# Patient Record
Sex: FEMALE | Race: WHITE | Employment: PART TIME | URBAN - METROPOLITAN AREA
[De-identification: names, ages, dates, MRNs, and addresses within clinical notes are randomized per-mention and may not be internally consistent; named-entity substitution may affect disease eponyms.]

---

## 2019-06-19 ASSESSMENT — ENCOUNTER SYMPTOMS
COUGH: 0
SORE THROAT: 0
SHORTNESS OF BREATH: 0
VOMITING: 0
CONSTIPATION: 0
DIARRHEA: 0
WHEEZING: 0
BACK PAIN: 0
BLOOD IN STOOL: 0
SINUS PAIN: 0
ABDOMINAL PAIN: 0
NAUSEA: 0

## 2019-06-19 NOTE — PROGRESS NOTES
New Patient Office Visit   6/21/2019    Subjective:  Chief Complaint   Patient presents with   José Miguel Mario Doctor     Would like to start back on Sertraline 100 mg     HPI:  Julieta Springer is a 22 y.o. female who presents to the clinic today to establish care. Mood- Pt reports a history of depression. She used to take zoloft 100 mg PO daily without side effects. She states that it was working well. She ran out of this medication 5 months ago and never followed up. Has never seen a psychologist- but states she does not have time with school at this time. No SI/HI. Allergic rhinitis- Taking Zyrtec with relief. Asymptomatic. Pap completed 05/25/18 with OBGYN - Dr. Chin Posadas. Not sexually active- no need for STD screen. OBGYN note shows she received Gardasil- will pull records. She reports that she believes she is up to date on vaccinations. Pt in  [employer] program. 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 For fun, her life is consumed with homework. Review of Systems   Constitutional: Negative for chills, fatigue, fever and unexpected weight change. HENT: Negative for congestion, postnasal drip, sinus pain, sore throat and tinnitus. Respiratory: Negative for cough, shortness of breath and wheezing. Cardiovascular: Negative for chest pain, palpitations and leg swelling. Gastrointestinal: Negative for abdominal pain, blood in stool, constipation, diarrhea, nausea and vomiting. Genitourinary: Negative for dysuria, frequency, hematuria and urgency. Musculoskeletal: Negative for back pain, gait problem, myalgias and neck pain. Skin: Negative for pallor and rash. Neurological: Negative for dizziness, weakness, light-headedness, numbness and headaches. Psychiatric/Behavioral: Positive for dysphoric mood. Negative for agitation, behavioral problems, confusion, decreased concentration, hallucinations, self-injury, sleep disturbance and suicidal ideas. The patient is nervous/anxious.  The patient is not hyperactive.       Allergies   Allergen Reactions    Ceftin [Cefuroxime] Rash     Family History   Problem Relation Age of Onset    No Known Problems Mother     Hypertension Father     Lung Cancer Maternal Grandmother     Diabetes Paternal Grandmother     Breast Cancer Neg Hx     Ovarian Cancer Neg Hx     Heart Attack Neg Hx     Stroke Neg Hx      Current Outpatient Rx   Medication Sig Dispense Refill    cetirizine (ZYRTEC) 10 MG tablet Take 10 mg by mouth daily      sertraline (ZOLOFT) 50 MG tablet Take 1 tablet by mouth daily 60 tablet 0     Social History     Socioeconomic History    Marital status: Single     Spouse name: Not on file    Number of children: Not on file    Years of education: Not on file    Highest education level: Not on file   Occupational History    Occupation: Student (full time)     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)    Occupation: [employer] (part time)   Social Needs    Financial resource strain: Not on file    Food insecurity:     Worry: Not on file     Inability: Not on file   [place] needs:     Medical: Not on file     Non-medical: Not on file   Tobacco Use    Smoking status: Never Smoker    Smokeless tobacco: Never Used   Substance and Sexual Activity    Alcohol use: No    Drug use: Never    Sexual activity: Not Currently   Lifestyle    Physical activity:     Days per week: Not on file     Minutes per session: Not on file    Stress: Not on file   Relationships    Social connections:     Talks on phone: Not on file     Gets together: Not on file     Attends Faith service: Not on file     Active member of club or organization: Not on file     Attends meetings of clubs or organizations: Not on file     Relationship status: Not on file    Intimate partner violence:     Fear of current or ex partner: Not on file     Emotionally abused: Not on file     Physically abused: Not on file     Forced sexual activity: Not on file Other Topics Concern    Not on file   Social History Narrative    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 For fun, her life is consumed with homework. Past Medical History:   Diagnosis Date    Depression     Seasonal allergies      Current Outpatient Medications   Medication Sig Dispense Refill    cetirizine (ZYRTEC) 10 MG tablet Take 10 mg by mouth daily      sertraline (ZOLOFT) 50 MG tablet Take 1 tablet by mouth daily 60 tablet 0     No current facility-administered medications for this visit. Patient Active Problem List   Diagnosis    Allergic rhinitis due to allergen    Anxiety and depression      Wt Readings from Last 3 Encounters:   06/21/19 254 lb (115.2 kg)   08/16/14 (!) 230 lb (104.3 kg) (99 %, Z= 2.30)*     * Growth percentiles are based on [facility] (Girls, 2-20 Years) data. BP Readings from Last 3 Encounters:   06/21/19 124/80     PHQ-9 Total Score: 24 (6/21/2019 10:47 AM)    Objective/Physical Exam:  /80   Pulse 83   Ht 5' 2\" (1.575 m)   Wt 254 lb (115.2 kg)   SpO2 98%   BMI 46.46 kg/m²   Body mass index is 46.46 kg/m². Physical Exam   Constitutional: She is oriented to person, place, and time. She appears well-developed and well-nourished. No distress. HENT:   Head: Normocephalic and atraumatic. Right Ear: External ear normal.   Left Ear: External ear normal.   Mouth/Throat: Oropharynx is clear and moist. No oropharyngeal exudate. Eyes: Pupils are equal, round, and reactive to light. Conjunctivae are normal.   Neck: Normal range of motion. Neck supple. No thyromegaly present. Cardiovascular: Normal rate, regular rhythm, normal heart sounds and intact distal pulses. Exam reveals no gallop and no friction rub. No murmur heard. Pulmonary/Chest: Effort normal and breath sounds normal. No respiratory distress. She has no wheezes. She has no rales. She exhibits no tenderness. Abdominal: Soft. Bowel sounds are normal. She exhibits no distension.  There is no tenderness. There is no guarding. Musculoskeletal: Normal range of motion. She exhibits no edema, tenderness or deformity. Lymphadenopathy:     She has no cervical adenopathy. Neurological: She is alert and oriented to person, place, and time. Coordination normal.   Skin: Skin is warm and dry. No rash noted. She is not diaphoretic. No erythema. No pallor. Psychiatric: She has a normal mood and affect. Her behavior is normal. Judgment and thought content normal.   Vitals reviewed. Assessment and Plan:  [place] was seen today for established new doctor. Diagnoses and all orders for this visit:    Encounter to establish care with new doctor    Seasonal allergic rhinitis due to other allergic trigger   - Well-controlled with current regimen. Continue current regimen. Anxiety and depression  -    Patient reports that her mood was stable on Zoloft 100 mg by mouth daily. She has been off this medication for months. No suicidal or homicidal ideation. She has never seen psychology. - The patient is agreeable to restarting Zoloft at 50 mg by mouth daily for 1 week and increasing to 100 mg by mouth daily if her mood is not improving to get back to the baseline dose that she was currently taking. She is agreeable to seeing psychology and a referral will be placed today. - Ambulatory referral to Psychology  -     CBC Auto Differential; Future  -     COMPREHENSIVE METABOLIC PANEL; Future  -     TSH WITH REFLEX TO FT4; Future  -     sertraline (ZOLOFT) 50 MG tablet; Take 1 tablet by mouth daily - patient education handout provided and reviewed with the pt. Screening for HIV (human immunodeficiency virus)  -     HIV Screen; Future    Return in about 1 month (around 7/19/2019) for mood f/u, or sooner if needed. Pt will call if symptoms worsen or fail to improve. All questions answered. Pt states no further questions or concerns at this time.    Electronically signed by: Sarah Harris 06/21/19

## 2019-06-21 ENCOUNTER — OFFICE VISIT (OUTPATIENT)
Dept: INTERNAL MEDICINE CLINIC | Age: 24
End: 2019-06-21
Payer: COMMERCIAL

## 2019-06-21 VITALS
BODY MASS INDEX: 46.74 KG/M2 | HEART RATE: 83 BPM | WEIGHT: 254 LBS | OXYGEN SATURATION: 98 % | HEIGHT: 62 IN | DIASTOLIC BLOOD PRESSURE: 80 MMHG | SYSTOLIC BLOOD PRESSURE: 124 MMHG

## 2019-06-21 DIAGNOSIS — Z76.89 ENCOUNTER TO ESTABLISH CARE WITH NEW DOCTOR: Primary | ICD-10-CM

## 2019-06-21 DIAGNOSIS — Z11.4 SCREENING FOR HIV (HUMAN IMMUNODEFICIENCY VIRUS): ICD-10-CM

## 2019-06-21 DIAGNOSIS — J30.89 SEASONAL ALLERGIC RHINITIS DUE TO OTHER ALLERGIC TRIGGER: ICD-10-CM

## 2019-06-21 DIAGNOSIS — F32.A ANXIETY AND DEPRESSION: ICD-10-CM

## 2019-06-21 DIAGNOSIS — F41.9 ANXIETY AND DEPRESSION: ICD-10-CM

## 2019-06-21 PROBLEM — J30.9 ALLERGIC RHINITIS DUE TO ALLERGEN: Status: ACTIVE | Noted: 2019-06-21

## 2019-06-21 PROCEDURE — 99203 OFFICE O/P NEW LOW 30 MIN: CPT | Performed by: NURSE PRACTITIONER

## 2019-06-21 PROCEDURE — 1036F TOBACCO NON-USER: CPT | Performed by: NURSE PRACTITIONER

## 2019-06-21 PROCEDURE — G8417 CALC BMI ABV UP PARAM F/U: HCPCS | Performed by: NURSE PRACTITIONER

## 2019-06-21 PROCEDURE — 96160 PT-FOCUSED HLTH RISK ASSMT: CPT | Performed by: NURSE PRACTITIONER

## 2019-06-21 PROCEDURE — G8427 DOCREV CUR MEDS BY ELIG CLIN: HCPCS | Performed by: NURSE PRACTITIONER

## 2019-06-21 RX ORDER — CETIRIZINE HYDROCHLORIDE 10 MG/1
10 TABLET ORAL DAILY
COMMUNITY

## 2019-06-21 RX ORDER — SERTRALINE HYDROCHLORIDE 100 MG/1
100 TABLET, FILM COATED ORAL DAILY
COMMUNITY
End: 2019-06-21

## 2019-06-21 ASSESSMENT — PATIENT HEALTH QUESTIONNAIRE - PHQ9
3. TROUBLE FALLING OR STAYING ASLEEP: 3
9. THOUGHTS THAT YOU WOULD BE BETTER OFF DEAD, OR OF HURTING YOURSELF: 1
5. POOR APPETITE OR OVEREATING: 3
4. FEELING TIRED OR HAVING LITTLE ENERGY: 3
8. MOVING OR SPEAKING SO SLOWLY THAT OTHER PEOPLE COULD HAVE NOTICED. OR THE OPPOSITE, BEING SO FIGETY OR RESTLESS THAT YOU HAVE BEEN MOVING AROUND A LOT MORE THAN USUAL: 3
SUM OF ALL RESPONSES TO PHQ QUESTIONS 1-9: 24
6. FEELING BAD ABOUT YOURSELF - OR THAT YOU ARE A FAILURE OR HAVE LET YOURSELF OR YOUR FAMILY DOWN: 3
10. IF YOU CHECKED OFF ANY PROBLEMS, HOW DIFFICULT HAVE THESE PROBLEMS MADE IT FOR YOU TO DO YOUR WORK, TAKE CARE OF THINGS AT HOME, OR GET ALONG WITH OTHER PEOPLE: 2
7. TROUBLE CONCENTRATING ON THINGS, SUCH AS READING THE NEWSPAPER OR WATCHING TELEVISION: 3
SUM OF ALL RESPONSES TO PHQ QUESTIONS 1-9: 24
1. LITTLE INTEREST OR PLEASURE IN DOING THINGS: 3
2. FEELING DOWN, DEPRESSED OR HOPELESS: 2
SUM OF ALL RESPONSES TO PHQ9 QUESTIONS 1 & 2: 5

## 2019-06-21 NOTE — PATIENT INSTRUCTIONS
After 1 week, if not feeling any better on Zoloft 50 mg, can increase to 100 mg daily. Please get your fasting lab work (no food or drink for 10-12 hours prior besides water) completed M-F [id number] at our office. [facility] lab has walk-in hours available as well - they are open Saturday 7a-3p - no appointment is needed. We will call with your results. Patient Education        sertraline  Pronunciation:  SER tra kin  Brand:  Zoloft  What is the most important information I should know about sertraline? You should not use sertraline if you also take pimozide, or if you are being treated with methylene blue injection. Do not use this medicine if you have used an MAO inhibitor in the past 14 days, such as isocarboxazid, linezolid, methylene blue injection, phenelzine, rasagiline, selegiline, or tranylcypromine. Some young people have thoughts about suicide when first taking an antidepressant. Stay alert to changes in your mood or symptoms. Report any new or worsening symptoms to your doctor. Seek medical attention right away if you have symptoms of serotonin syndrome, such as: agitation, hallucinations, fever, sweating, shivering, fast heart rate, muscle stiffness, twitching, loss of coordination, nausea, vomiting, or diarrhea. What is sertraline? Sertraline is an antidepressant in a group of drugs called selective serotonin reuptake inhibitors (SSRIs). Sertraline affects chemicals in the brain that may be unbalanced in people with depression, panic, anxiety, or obsessive-compulsive symptoms. Sertraline is used to treat depression, obsessive-compulsive disorder, panic disorder, anxiety disorders, post-traumatic stress disorder (PTSD), and premenstrual dysphoric disorder (PMDD). Sertraline may also be used for purposes not listed in this medication guide. What should I discuss with my healthcare provider before taking sertraline?   You should not use sertraline if you are allergic to it, or if you also take pimozide. Do not use the liquid form of sertraline  if you are taking disulfiram (Antabuse) or you could have a severe reaction to the disulfiram.  Do not take sertraline within 14 days before or 14 days after you take an MAO inhibitor. A dangerous drug interaction could occur. MAO inhibitors include isocarboxazid, linezolid, phenelzine, rasagiline, selegiline, and tranylcypromine. To make sure sertraline is safe for you, tell your doctor if you have ever had:  · heart disease, high blood pressure, or a stroke;  · liver or kidney disease;  · a seizure;  · bleeding problems, or if you take warfarin (Coumadin, Jantoven);  · bipolar disorder (manic depression); or  · low levels of sodium in your blood. Some medicines can interact with sertraline and cause a serious condition called serotonin syndrome. Be sure your doctor knows if you also take stimulant medicine, opioid medicine, herbal products, other antidepressants, or medicine for mental illness, Parkinson's disease, migraine headaches, serious infections, or prevention of nausea and vomiting. Ask your doctor before making any changes in how or when you take your medications. Some young people have thoughts about suicide when first taking an antidepressant. Your doctor should check your progress at regular visits. Your family or other caregivers should also be alert to changes in your mood or symptoms. Taking an SSRI antidepressant during pregnancy may cause serious lung problems or other complications in the baby. However, you may have a relapse of depression if you stop taking your antidepressant. Tell your doctor right away if you become pregnant. Do not start or stop taking this medicine during pregnancy without your doctor's advice. It is not known whether sertraline passes into breast milk or if it could harm a nursing baby. Tell your doctor if you are breast-feeding a baby.   Do not give sertraline to anyone younger than 25years old without the advice of a doctor. Sertraline is FDA-approved for children with obsessive-compulsive disorder (OCD). It is not approved for treating depression in children. How should I take sertraline? Follow all directions on your prescription label. Your doctor may occasionally change your dose. Do not take this medicine in larger or smaller amounts or for longer than recommended. Sertraline may be taken with or without food. Try to take the medicine at the same time each day. The liquid (oral concentrate) form of sertraline must be diluted before you take it. To be sure you get the correct dose, measure the liquid with the medicine dropper provided. Mix the dose with 4 ounces (one-half cup) of water, ginger ale, lemon/lime soda, lemonade, or orange juice. Do not use any other liquids to dilute the medicine. Stir this mixture and drink all of it right away. To make sure you get the entire dose, add a little more water to the same glass, swirl gently and drink right away. This medicine can cause you to have a false positive drug screening test. If you provide a urine sample for drug screening, tell the laboratory staff that you are taking sertraline. It may take up to 4 weeks before your symptoms improve. Keep using the medication as directed and tell your doctor if your symptoms do not improve. Do not stop using sertraline suddenly, or you could have unpleasant withdrawal symptoms. Ask your doctor how to safely stop using sertraline. Store at room temperature away from moisture and heat. What happens if I miss a dose? Take the missed dose as soon as you remember. Skip the missed dose if it is almost time for your next scheduled dose. Do not take extra medicine to make up the missed dose. What happens if I overdose? Seek emergency medical attention or call the Poison Help line at 1-528.955.7763. What should I avoid while taking sertraline? Do not drink alcohol.   Ask your doctor before taking a your doctor for medical advice about side effects. You may report side effects to FDA at 9-415-KBS-3783. What other drugs will affect sertraline? Taking sertraline with other drugs that make you sleepy can worsen this effect. Ask your doctor before taking a sleeping pill, narcotic medication, muscle relaxer, or medicine for anxiety, depression, or seizures. Other drugs may interact with sertraline, including prescription and over-the-counter medicines, vitamins, and herbal products. Tell your doctor about all your current medicines and any medicine you start or stop using. Where can I get more information? Your pharmacist can provide more information about sertraline. Remember, keep this and all other medicines out of the reach of children, never share your medicines with others, and use this medication only for the indication prescribed. Every effort has been made to ensure that the information provided by Kirsten Ver[address] is accurate, up-to-date, and complete, but no guarantee is made to that effect. Drug information contained herein may be time sensitive. [employer] information has been compiled for use by healthcare practitioners and consumers in the [place] and therefore [employer] does not warrant that uses outside of the [place] are appropriate, unless specifically indicated otherwise. [employer]'s drug information does not endorse drugs, diagnose patients or recommend therapy. [employer] drug information is an informational resource designed to assist licensed healthcare practitioners in caring for their patients and/or to serve consumers viewing this service as a supplement to, and not a substitute for, the expertise, skill, knowledge and judgment of healthcare practitioners. The absence of a warning for a given drug or drug combination in no way should be construed to indicate that the drug or drug combination is safe, effective or appropriate for any given patient.  [employer] does not assume any responsibility for any aspect of healthcare administered with the aid of information [facility] provides. The information contained herein is not intended to cover all possible uses, directions, precautions, warnings, drug interactions, allergic reactions, or adverse effects. If you have questions about the drugs you are taking, check with your doctor, nurse or pharmacist.  Copyright 3904-2703 [facility] [employer]. Version: 21.01. Revision date: 8/9/2017. Care instructions adapted under license by [facility] ([facility]). If you have questions about a medical condition or this instruction, always ask your healthcare professional. [place] [address] any warranty or liability for your use of this information.

## 2019-06-24 ENCOUNTER — TELEPHONE (OUTPATIENT)
Dept: INTERNAL MEDICINE CLINIC | Age: 24
End: 2019-06-24

## 2019-06-24 NOTE — TELEPHONE ENCOUNTER
One tablet daily for 1-2 weeks and if pt tolerates the medication well without side effects but feels the medication is not helping at the current dose, she can increase to two tablets daily.  She was taking two tablets daily prior

## 2019-06-24 NOTE — TELEPHONE ENCOUNTER
u[facility] Lin calling about the Sertraline script---directions say 1 castillo by mouth---pt says she was suppose to take 1 castillo for a week and then 2 castillo after that----they need correct directions---please let them know. Thanks.

## 2019-07-12 ENCOUNTER — TELEPHONE (OUTPATIENT)
Dept: INTERNAL MEDICINE CLINIC | Age: 24
End: 2019-07-12

## 2019-07-15 ENCOUNTER — OFFICE VISIT (OUTPATIENT)
Dept: PSYCHOLOGY | Age: 24
End: 2019-07-15
Payer: COMMERCIAL

## 2019-07-15 DIAGNOSIS — F41.1 GAD (GENERALIZED ANXIETY DISORDER): ICD-10-CM

## 2019-07-15 DIAGNOSIS — F33.1 MODERATE EPISODE OF RECURRENT MAJOR DEPRESSIVE DISORDER (HCC): Primary | ICD-10-CM

## 2019-07-15 PROCEDURE — 90791 PSYCH DIAGNOSTIC EVALUATION: CPT | Performed by: PSYCHOLOGIST

## 2019-07-15 ASSESSMENT — PATIENT HEALTH QUESTIONNAIRE - PHQ9
6. FEELING BAD ABOUT YOURSELF - OR THAT YOU ARE A FAILURE OR HAVE LET YOURSELF OR YOUR FAMILY DOWN: 2
SUM OF ALL RESPONSES TO PHQ9 QUESTIONS 1 & 2: 4
SUM OF ALL RESPONSES TO PHQ QUESTIONS 1-9: 21
9. THOUGHTS THAT YOU WOULD BE BETTER OFF DEAD, OR OF HURTING YOURSELF: 0
5. POOR APPETITE OR OVEREATING: 3
2. FEELING DOWN, DEPRESSED OR HOPELESS: 2
7. TROUBLE CONCENTRATING ON THINGS, SUCH AS READING THE NEWSPAPER OR WATCHING TELEVISION: 3
1. LITTLE INTEREST OR PLEASURE IN DOING THINGS: 2
3. TROUBLE FALLING OR STAYING ASLEEP: 3
4. FEELING TIRED OR HAVING LITTLE ENERGY: 3
SUM OF ALL RESPONSES TO PHQ QUESTIONS 1-9: 21
8. MOVING OR SPEAKING SO SLOWLY THAT OTHER PEOPLE COULD HAVE NOTICED. OR THE OPPOSITE, BEING SO FIGETY OR RESTLESS THAT YOU HAVE BEEN MOVING AROUND A LOT MORE THAN USUAL: 3
10. IF YOU CHECKED OFF ANY PROBLEMS, HOW DIFFICULT HAVE THESE PROBLEMS MADE IT FOR YOU TO DO YOUR WORK, TAKE CARE OF THINGS AT HOME, OR GET ALONG WITH OTHER PEOPLE: 1

## 2019-07-15 ASSESSMENT — ENCOUNTER SYMPTOMS
NAUSEA: 0
COUGH: 0
WHEEZING: 0
CONSTIPATION: 0
DIARRHEA: 0
SHORTNESS OF BREATH: 0
ABDOMINAL PAIN: 0
VOMITING: 0

## 2019-07-19 ENCOUNTER — OFFICE VISIT (OUTPATIENT)
Dept: INTERNAL MEDICINE CLINIC | Age: 24
End: 2019-07-19
Payer: MEDICAID

## 2019-07-19 VITALS
BODY MASS INDEX: 46.82 KG/M2 | WEIGHT: 256 LBS | OXYGEN SATURATION: 99 % | HEART RATE: 93 BPM | SYSTOLIC BLOOD PRESSURE: 124 MMHG | DIASTOLIC BLOOD PRESSURE: 82 MMHG

## 2019-07-19 DIAGNOSIS — F32.A ANXIETY AND DEPRESSION: Primary | ICD-10-CM

## 2019-07-19 DIAGNOSIS — Z13.31 POSITIVE DEPRESSION SCREENING: ICD-10-CM

## 2019-07-19 DIAGNOSIS — F41.9 ANXIETY AND DEPRESSION: Primary | ICD-10-CM

## 2019-07-19 PROCEDURE — G8427 DOCREV CUR MEDS BY ELIG CLIN: HCPCS | Performed by: NURSE PRACTITIONER

## 2019-07-19 PROCEDURE — G8431 POS CLIN DEPRES SCRN F/U DOC: HCPCS | Performed by: NURSE PRACTITIONER

## 2019-07-19 PROCEDURE — G8417 CALC BMI ABV UP PARAM F/U: HCPCS | Performed by: NURSE PRACTITIONER

## 2019-07-19 PROCEDURE — 96160 PT-FOCUSED HLTH RISK ASSMT: CPT | Performed by: NURSE PRACTITIONER

## 2019-07-19 PROCEDURE — 1036F TOBACCO NON-USER: CPT | Performed by: NURSE PRACTITIONER

## 2019-07-19 PROCEDURE — 99213 OFFICE O/P EST LOW 20 MIN: CPT | Performed by: NURSE PRACTITIONER

## 2019-07-19 RX ORDER — SERTRALINE HYDROCHLORIDE 100 MG/1
100 TABLET, FILM COATED ORAL DAILY
Qty: 30 TABLET | Refills: 0 | Status: SHIPPED | OUTPATIENT
Start: 2019-07-19 | End: 2019-08-07 | Stop reason: SDUPTHER

## 2019-07-19 ASSESSMENT — PATIENT HEALTH QUESTIONNAIRE - PHQ9
3. TROUBLE FALLING OR STAYING ASLEEP: 3
9. THOUGHTS THAT YOU WOULD BE BETTER OFF DEAD, OR OF HURTING YOURSELF: 0
6. FEELING BAD ABOUT YOURSELF - OR THAT YOU ARE A FAILURE OR HAVE LET YOURSELF OR YOUR FAMILY DOWN: 2
SUM OF ALL RESPONSES TO PHQ9 QUESTIONS 1 & 2: 2
8. MOVING OR SPEAKING SO SLOWLY THAT OTHER PEOPLE COULD HAVE NOTICED. OR THE OPPOSITE, BEING SO FIGETY OR RESTLESS THAT YOU HAVE BEEN MOVING AROUND A LOT MORE THAN USUAL: 3
SUM OF ALL RESPONSES TO PHQ QUESTIONS 1-9: 19
4. FEELING TIRED OR HAVING LITTLE ENERGY: 3
SUM OF ALL RESPONSES TO PHQ QUESTIONS 1-9: 19
2. FEELING DOWN, DEPRESSED OR HOPELESS: 1
10. IF YOU CHECKED OFF ANY PROBLEMS, HOW DIFFICULT HAVE THESE PROBLEMS MADE IT FOR YOU TO DO YOUR WORK, TAKE CARE OF THINGS AT HOME, OR GET ALONG WITH OTHER PEOPLE: 1
7. TROUBLE CONCENTRATING ON THINGS, SUCH AS READING THE NEWSPAPER OR WATCHING TELEVISION: 3
5. POOR APPETITE OR OVEREATING: 3
1. LITTLE INTEREST OR PLEASURE IN DOING THINGS: 1

## 2019-07-19 ASSESSMENT — ENCOUNTER SYMPTOMS
COUGH: 0
DIARRHEA: 0
ABDOMINAL PAIN: 0
NAUSEA: 0
SHORTNESS OF BREATH: 0
WHEEZING: 0
VOMITING: 0
CONSTIPATION: 0

## 2019-07-23 RX ORDER — HYDROCORTISONE 25 MG/ML
LOTION TOPICAL 2 TIMES DAILY
COMMUNITY
End: 2019-08-07 | Stop reason: SDUPTHER

## 2019-08-07 ENCOUNTER — OFFICE VISIT (OUTPATIENT)
Dept: INTERNAL MEDICINE CLINIC | Age: 24
End: 2019-08-07
Payer: MEDICAID

## 2019-08-07 VITALS
HEART RATE: 86 BPM | BODY MASS INDEX: 47.11 KG/M2 | OXYGEN SATURATION: 98 % | HEIGHT: 62 IN | DIASTOLIC BLOOD PRESSURE: 80 MMHG | SYSTOLIC BLOOD PRESSURE: 119 MMHG | WEIGHT: 256 LBS

## 2019-08-07 DIAGNOSIS — L30.9 CHRONIC ECZEMA: ICD-10-CM

## 2019-08-07 DIAGNOSIS — F41.9 ANXIETY AND DEPRESSION: Primary | ICD-10-CM

## 2019-08-07 DIAGNOSIS — F32.A ANXIETY AND DEPRESSION: Primary | ICD-10-CM

## 2019-08-07 PROCEDURE — G8427 DOCREV CUR MEDS BY ELIG CLIN: HCPCS | Performed by: NURSE PRACTITIONER

## 2019-08-07 PROCEDURE — 96160 PT-FOCUSED HLTH RISK ASSMT: CPT | Performed by: NURSE PRACTITIONER

## 2019-08-07 PROCEDURE — G8417 CALC BMI ABV UP PARAM F/U: HCPCS | Performed by: NURSE PRACTITIONER

## 2019-08-07 PROCEDURE — 1036F TOBACCO NON-USER: CPT | Performed by: NURSE PRACTITIONER

## 2019-08-07 PROCEDURE — 99213 OFFICE O/P EST LOW 20 MIN: CPT | Performed by: NURSE PRACTITIONER

## 2019-08-07 RX ORDER — SERTRALINE HYDROCHLORIDE 100 MG/1
100 TABLET, FILM COATED ORAL DAILY
Qty: 90 TABLET | Refills: 0 | Status: SHIPPED | OUTPATIENT
Start: 2019-08-07 | End: 2019-11-07 | Stop reason: SDUPTHER

## 2019-08-07 RX ORDER — HYDROCORTISONE 25 MG/ML
LOTION TOPICAL 2 TIMES DAILY
Qty: 1 BOTTLE | Refills: 0 | Status: SHIPPED | OUTPATIENT
Start: 2019-08-07 | End: 2019-11-12 | Stop reason: SDUPTHER

## 2019-08-07 ASSESSMENT — PATIENT HEALTH QUESTIONNAIRE - PHQ9
6. FEELING BAD ABOUT YOURSELF - OR THAT YOU ARE A FAILURE OR HAVE LET YOURSELF OR YOUR FAMILY DOWN: 1
9. THOUGHTS THAT YOU WOULD BE BETTER OFF DEAD, OR OF HURTING YOURSELF: 0
3. TROUBLE FALLING OR STAYING ASLEEP: 2
1. LITTLE INTEREST OR PLEASURE IN DOING THINGS: 1
10. IF YOU CHECKED OFF ANY PROBLEMS, HOW DIFFICULT HAVE THESE PROBLEMS MADE IT FOR YOU TO DO YOUR WORK, TAKE CARE OF THINGS AT HOME, OR GET ALONG WITH OTHER PEOPLE: 1
SUM OF ALL RESPONSES TO PHQ9 QUESTIONS 1 & 2: 2
SUM OF ALL RESPONSES TO PHQ QUESTIONS 1-9: 15
SUM OF ALL RESPONSES TO PHQ QUESTIONS 1-9: 15
7. TROUBLE CONCENTRATING ON THINGS, SUCH AS READING THE NEWSPAPER OR WATCHING TELEVISION: 2
2. FEELING DOWN, DEPRESSED OR HOPELESS: 1
8. MOVING OR SPEAKING SO SLOWLY THAT OTHER PEOPLE COULD HAVE NOTICED. OR THE OPPOSITE, BEING SO FIGETY OR RESTLESS THAT YOU HAVE BEEN MOVING AROUND A LOT MORE THAN USUAL: 2
4. FEELING TIRED OR HAVING LITTLE ENERGY: 3
5. POOR APPETITE OR OVEREATING: 3

## 2019-08-14 ENCOUNTER — TELEPHONE (OUTPATIENT)
Dept: INTERNAL MEDICINE CLINIC | Age: 24
End: 2019-08-14
Payer: COMMERCIAL

## 2019-08-14 ENCOUNTER — OFFICE VISIT (OUTPATIENT)
Dept: PSYCHOLOGY | Age: 24
End: 2019-08-14
Payer: MEDICAID

## 2019-08-14 DIAGNOSIS — F41.1 GAD (GENERALIZED ANXIETY DISORDER): ICD-10-CM

## 2019-08-14 DIAGNOSIS — R30.0 DYSURIA: Primary | ICD-10-CM

## 2019-08-14 DIAGNOSIS — F33.1 MODERATE EPISODE OF RECURRENT MAJOR DEPRESSIVE DISORDER (HCC): Primary | ICD-10-CM

## 2019-08-14 LAB
BILIRUBIN, POC: NORMAL
BLOOD URINE, POC: NORMAL
CLARITY, POC: NORMAL
COLOR, POC: NORMAL
GLUCOSE URINE, POC: NORMAL
KETONES, POC: NORMAL
LEUKOCYTE EST, POC: NORMAL
NITRITE, POC: NORMAL
PH, POC: 5.5
PROTEIN, POC: NORMAL
SPECIFIC GRAVITY, POC: >=1.03
UROBILINOGEN, POC: 0.2

## 2019-08-14 PROCEDURE — 81002 URINALYSIS NONAUTO W/O SCOPE: CPT | Performed by: NURSE PRACTITIONER

## 2019-08-14 PROCEDURE — 90832 PSYTX W PT 30 MINUTES: CPT | Performed by: PSYCHOLOGIST

## 2019-08-14 ASSESSMENT — PATIENT HEALTH QUESTIONNAIRE - PHQ9
6. FEELING BAD ABOUT YOURSELF - OR THAT YOU ARE A FAILURE OR HAVE LET YOURSELF OR YOUR FAMILY DOWN: 1
3. TROUBLE FALLING OR STAYING ASLEEP: 3
SUM OF ALL RESPONSES TO PHQ QUESTIONS 1-9: 17
2. FEELING DOWN, DEPRESSED OR HOPELESS: 1
SUM OF ALL RESPONSES TO PHQ QUESTIONS 1-9: 17
7. TROUBLE CONCENTRATING ON THINGS, SUCH AS READING THE NEWSPAPER OR WATCHING TELEVISION: 2
5. POOR APPETITE OR OVEREATING: 3
9. THOUGHTS THAT YOU WOULD BE BETTER OFF DEAD, OR OF HURTING YOURSELF: 0
8. MOVING OR SPEAKING SO SLOWLY THAT OTHER PEOPLE COULD HAVE NOTICED. OR THE OPPOSITE, BEING SO FIGETY OR RESTLESS THAT YOU HAVE BEEN MOVING AROUND A LOT MORE THAN USUAL: 3
4. FEELING TIRED OR HAVING LITTLE ENERGY: 3
SUM OF ALL RESPONSES TO PHQ9 QUESTIONS 1 & 2: 2
10. IF YOU CHECKED OFF ANY PROBLEMS, HOW DIFFICULT HAVE THESE PROBLEMS MADE IT FOR YOU TO DO YOUR WORK, TAKE CARE OF THINGS AT HOME, OR GET ALONG WITH OTHER PEOPLE: 1
1. LITTLE INTEREST OR PLEASURE IN DOING THINGS: 1

## 2019-08-14 NOTE — PROGRESS NOTES
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[employer])    Occupation: [employer] (part time)   Social Needs    Financial resource strain: Not on file    Food insecurity:     Worry: Not on file     Inability: Not on file   [email or web address] needs:     Medical: Not on file     Non-medical: Not on file   Tobacco Use    Smoking status: Never Smoker    Smokeless tobacco: Never Used   Substance and Sexual Activity    Alcohol use: No    Drug use: Never    Sexual activity: Not Currently   Lifestyle    Physical activity:     Days per week: Not on file     Minutes per session: Not on file    Stress: Not on file   Relationships    Social connections:     Talks on phone: Not on file     Gets together: Not on file     Attends Jewish service: Not on file     Active member of club or organization: Not on file     Attends meetings of clubs or organizations: Not on file     Relationship status: Not on file    Intimate partner violence:     Fear of current or ex partner: Not on file     Emotionally abused: Not on file     Physically abused: Not on file     Forced sexual activity: Not on file   Other Topics Concern    Not on file   Social History Narrative    Pt in  [employer] program. 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 For fun, her life is consumed with homework. TOBACCO:   reports that she has never smoked. She has never used smokeless tobacco.  ETOH:   reports that she does not drink alcohol. A:  Administered PHQ-9 (see below). Patient endorses moderately severe symptoms of depression. Denied SI/HI.      PHQ Scores 8/14/2019 8/7/2019 7/19/2019 7/15/2019 6/21/2019   PHQ2 Score 2 2 2 4 5   PHQ9 Score 17 15 19 21 24     Interpretation of Total Score Depression Severity: 1-4 = Minimal depression, 5-9 = Mild depression, 10-14 = Moderate depression, 15-19 = Moderately severe depression, 20-27 = Severe depression        Diagnosis:  Major depressive disorder; recurrent and moderate  Generalized anxiety disorder  R/O PTSD    Plan:  Pt

## 2019-08-26 PROBLEM — F33.1 MODERATE EPISODE OF RECURRENT MAJOR DEPRESSIVE DISORDER (HCC): Status: ACTIVE | Noted: 2019-08-26

## 2019-08-26 PROBLEM — F41.1 GAD (GENERALIZED ANXIETY DISORDER): Status: ACTIVE | Noted: 2019-08-26

## 2019-09-23 ENCOUNTER — NURSE ONLY (OUTPATIENT)
Dept: INTERNAL MEDICINE CLINIC | Age: 24
End: 2019-09-23
Payer: MEDICAID

## 2019-09-23 ENCOUNTER — TELEPHONE (OUTPATIENT)
Dept: INTERNAL MEDICINE CLINIC | Age: 24
End: 2019-09-23

## 2019-09-23 DIAGNOSIS — Z23 NEED FOR TDAP VACCINATION: Primary | ICD-10-CM

## 2019-09-23 PROCEDURE — 90471 IMMUNIZATION ADMIN: CPT | Performed by: NURSE PRACTITIONER

## 2019-09-23 PROCEDURE — 90715 TDAP VACCINE 7 YRS/> IM: CPT | Performed by: NURSE PRACTITIONER

## 2019-10-02 ENCOUNTER — OFFICE VISIT (OUTPATIENT)
Dept: PSYCHOLOGY | Age: 24
End: 2019-10-02
Payer: MEDICAID

## 2019-10-02 DIAGNOSIS — F33.1 MODERATE EPISODE OF RECURRENT MAJOR DEPRESSIVE DISORDER (HCC): Primary | ICD-10-CM

## 2019-10-02 DIAGNOSIS — F41.1 GAD (GENERALIZED ANXIETY DISORDER): ICD-10-CM

## 2019-10-02 PROCEDURE — 90832 PSYTX W PT 30 MINUTES: CPT | Performed by: PSYCHOLOGIST

## 2019-10-02 ASSESSMENT — PATIENT HEALTH QUESTIONNAIRE - PHQ9
SUM OF ALL RESPONSES TO PHQ QUESTIONS 1-9: 14
1. LITTLE INTEREST OR PLEASURE IN DOING THINGS: 0
8. MOVING OR SPEAKING SO SLOWLY THAT OTHER PEOPLE COULD HAVE NOTICED. OR THE OPPOSITE, BEING SO FIGETY OR RESTLESS THAT YOU HAVE BEEN MOVING AROUND A LOT MORE THAN USUAL: 3
9. THOUGHTS THAT YOU WOULD BE BETTER OFF DEAD, OR OF HURTING YOURSELF: 0
7. TROUBLE CONCENTRATING ON THINGS, SUCH AS READING THE NEWSPAPER OR WATCHING TELEVISION: 2
SUM OF ALL RESPONSES TO PHQ QUESTIONS 1-9: 14
SUM OF ALL RESPONSES TO PHQ9 QUESTIONS 1 & 2: 1
5. POOR APPETITE OR OVEREATING: 2
2. FEELING DOWN, DEPRESSED OR HOPELESS: 1
3. TROUBLE FALLING OR STAYING ASLEEP: 1
4. FEELING TIRED OR HAVING LITTLE ENERGY: 3
6. FEELING BAD ABOUT YOURSELF - OR THAT YOU ARE A FAILURE OR HAVE LET YOURSELF OR YOUR FAMILY DOWN: 2
10. IF YOU CHECKED OFF ANY PROBLEMS, HOW DIFFICULT HAVE THESE PROBLEMS MADE IT FOR YOU TO DO YOUR WORK, TAKE CARE OF THINGS AT HOME, OR GET ALONG WITH OTHER PEOPLE: 1

## 2019-10-30 ENCOUNTER — OFFICE VISIT (OUTPATIENT)
Dept: PSYCHOLOGY | Age: 24
End: 2019-10-30
Payer: MEDICAID

## 2019-10-30 DIAGNOSIS — F41.1 GAD (GENERALIZED ANXIETY DISORDER): ICD-10-CM

## 2019-10-30 DIAGNOSIS — F33.1 MODERATE EPISODE OF RECURRENT MAJOR DEPRESSIVE DISORDER (HCC): Primary | ICD-10-CM

## 2019-10-30 PROCEDURE — 90832 PSYTX W PT 30 MINUTES: CPT | Performed by: PSYCHOLOGIST

## 2019-10-31 ASSESSMENT — ENCOUNTER SYMPTOMS
NAUSEA: 0
DIARRHEA: 0
VOMITING: 0
WHEEZING: 0
SHORTNESS OF BREATH: 0
CONSTIPATION: 0
ABDOMINAL PAIN: 0
COUGH: 0

## 2019-11-07 ENCOUNTER — OFFICE VISIT (OUTPATIENT)
Dept: INTERNAL MEDICINE CLINIC | Age: 24
End: 2019-11-07
Payer: MEDICAID

## 2019-11-07 VITALS
DIASTOLIC BLOOD PRESSURE: 84 MMHG | HEART RATE: 76 BPM | SYSTOLIC BLOOD PRESSURE: 126 MMHG | OXYGEN SATURATION: 98 % | BODY MASS INDEX: 47.92 KG/M2 | WEIGHT: 262 LBS

## 2019-11-07 DIAGNOSIS — F41.9 ANXIETY AND DEPRESSION: Primary | ICD-10-CM

## 2019-11-07 DIAGNOSIS — F32.A ANXIETY AND DEPRESSION: Primary | ICD-10-CM

## 2019-11-07 DIAGNOSIS — G47.09 OTHER INSOMNIA: ICD-10-CM

## 2019-11-07 DIAGNOSIS — Z13.31 POSITIVE DEPRESSION SCREENING: ICD-10-CM

## 2019-11-07 DIAGNOSIS — R82.90 CLOUDY URINE: ICD-10-CM

## 2019-11-07 DIAGNOSIS — L30.9 CHRONIC ECZEMA: ICD-10-CM

## 2019-11-07 PROCEDURE — G8431 POS CLIN DEPRES SCRN F/U DOC: HCPCS | Performed by: NURSE PRACTITIONER

## 2019-11-07 PROCEDURE — G8427 DOCREV CUR MEDS BY ELIG CLIN: HCPCS | Performed by: NURSE PRACTITIONER

## 2019-11-07 PROCEDURE — 99214 OFFICE O/P EST MOD 30 MIN: CPT | Performed by: NURSE PRACTITIONER

## 2019-11-07 PROCEDURE — 1036F TOBACCO NON-USER: CPT | Performed by: NURSE PRACTITIONER

## 2019-11-07 PROCEDURE — G8417 CALC BMI ABV UP PARAM F/U: HCPCS | Performed by: NURSE PRACTITIONER

## 2019-11-07 PROCEDURE — G8484 FLU IMMUNIZE NO ADMIN: HCPCS | Performed by: NURSE PRACTITIONER

## 2019-11-07 PROCEDURE — 96160 PT-FOCUSED HLTH RISK ASSMT: CPT | Performed by: NURSE PRACTITIONER

## 2019-11-07 RX ORDER — SERTRALINE HYDROCHLORIDE 100 MG/1
100 TABLET, FILM COATED ORAL DAILY
Qty: 90 TABLET | Refills: 1 | Status: SHIPPED | OUTPATIENT
Start: 2019-11-07 | End: 2019-12-17

## 2019-11-07 ASSESSMENT — PATIENT HEALTH QUESTIONNAIRE - PHQ9
7. TROUBLE CONCENTRATING ON THINGS, SUCH AS READING THE NEWSPAPER OR WATCHING TELEVISION: 1
SUM OF ALL RESPONSES TO PHQ QUESTIONS 1-9: 10
8. MOVING OR SPEAKING SO SLOWLY THAT OTHER PEOPLE COULD HAVE NOTICED. OR THE OPPOSITE, BEING SO FIGETY OR RESTLESS THAT YOU HAVE BEEN MOVING AROUND A LOT MORE THAN USUAL: 2
1. LITTLE INTEREST OR PLEASURE IN DOING THINGS: 0
5. POOR APPETITE OR OVEREATING: 2
4. FEELING TIRED OR HAVING LITTLE ENERGY: 2
3. TROUBLE FALLING OR STAYING ASLEEP: 2
9. THOUGHTS THAT YOU WOULD BE BETTER OFF DEAD, OR OF HURTING YOURSELF: 0
2. FEELING DOWN, DEPRESSED OR HOPELESS: 1
10. IF YOU CHECKED OFF ANY PROBLEMS, HOW DIFFICULT HAVE THESE PROBLEMS MADE IT FOR YOU TO DO YOUR WORK, TAKE CARE OF THINGS AT HOME, OR GET ALONG WITH OTHER PEOPLE: 1
6. FEELING BAD ABOUT YOURSELF - OR THAT YOU ARE A FAILURE OR HAVE LET YOURSELF OR YOUR FAMILY DOWN: 0
SUM OF ALL RESPONSES TO PHQ9 QUESTIONS 1 & 2: 1
SUM OF ALL RESPONSES TO PHQ QUESTIONS 1-9: 10

## 2019-11-07 ASSESSMENT — ENCOUNTER SYMPTOMS
BLOOD IN STOOL: 0
BACK PAIN: 0

## 2019-11-12 ENCOUNTER — OFFICE VISIT (OUTPATIENT)
Dept: INTERNAL MEDICINE CLINIC | Age: 24
End: 2019-11-12
Payer: MEDICAID

## 2019-11-12 VITALS
WEIGHT: 256 LBS | TEMPERATURE: 97.8 F | DIASTOLIC BLOOD PRESSURE: 82 MMHG | SYSTOLIC BLOOD PRESSURE: 124 MMHG | OXYGEN SATURATION: 99 % | HEART RATE: 81 BPM | BODY MASS INDEX: 46.82 KG/M2

## 2019-11-12 DIAGNOSIS — L30.9 CHRONIC ECZEMA: ICD-10-CM

## 2019-11-12 DIAGNOSIS — K59.1 FUNCTIONAL DIARRHEA: Primary | ICD-10-CM

## 2019-11-12 PROCEDURE — 99213 OFFICE O/P EST LOW 20 MIN: CPT | Performed by: NURSE PRACTITIONER

## 2019-11-12 PROCEDURE — 1036F TOBACCO NON-USER: CPT | Performed by: NURSE PRACTITIONER

## 2019-11-12 PROCEDURE — G8484 FLU IMMUNIZE NO ADMIN: HCPCS | Performed by: NURSE PRACTITIONER

## 2019-11-12 PROCEDURE — G8427 DOCREV CUR MEDS BY ELIG CLIN: HCPCS | Performed by: NURSE PRACTITIONER

## 2019-11-12 PROCEDURE — G8417 CALC BMI ABV UP PARAM F/U: HCPCS | Performed by: NURSE PRACTITIONER

## 2019-11-12 RX ORDER — HYDROCORTISONE 25 MG/ML
LOTION TOPICAL 2 TIMES DAILY
Qty: 1 BOTTLE | Refills: 0 | Status: SHIPPED | OUTPATIENT
Start: 2019-11-12 | End: 2019-12-05

## 2019-11-12 ASSESSMENT — ENCOUNTER SYMPTOMS
VOMITING: 0
SHORTNESS OF BREATH: 0
RECTAL PAIN: 0
CONSTIPATION: 0
ROS SKIN COMMENTS: ECZEMA
BACK PAIN: 0
COUGH: 0
ANAL BLEEDING: 1
DIARRHEA: 1
ABDOMINAL PAIN: 0
NAUSEA: 0

## 2019-11-20 ENCOUNTER — OFFICE VISIT (OUTPATIENT)
Dept: PSYCHOLOGY | Age: 24
End: 2019-11-20
Payer: MEDICAID

## 2019-11-20 DIAGNOSIS — F41.1 GAD (GENERALIZED ANXIETY DISORDER): ICD-10-CM

## 2019-11-20 DIAGNOSIS — F33.1 MODERATE EPISODE OF RECURRENT MAJOR DEPRESSIVE DISORDER (HCC): Primary | ICD-10-CM

## 2019-11-20 PROCEDURE — 90832 PSYTX W PT 30 MINUTES: CPT | Performed by: PSYCHOLOGIST

## 2019-11-20 ASSESSMENT — PATIENT HEALTH QUESTIONNAIRE - PHQ9
9. THOUGHTS THAT YOU WOULD BE BETTER OFF DEAD, OR OF HURTING YOURSELF: 0
SUM OF ALL RESPONSES TO PHQ QUESTIONS 1-9: 11
SUM OF ALL RESPONSES TO PHQ9 QUESTIONS 1 & 2: 1
10. IF YOU CHECKED OFF ANY PROBLEMS, HOW DIFFICULT HAVE THESE PROBLEMS MADE IT FOR YOU TO DO YOUR WORK, TAKE CARE OF THINGS AT HOME, OR GET ALONG WITH OTHER PEOPLE: 1
3. TROUBLE FALLING OR STAYING ASLEEP: 1
2. FEELING DOWN, DEPRESSED OR HOPELESS: 1
5. POOR APPETITE OR OVEREATING: 2
1. LITTLE INTEREST OR PLEASURE IN DOING THINGS: 0
6. FEELING BAD ABOUT YOURSELF - OR THAT YOU ARE A FAILURE OR HAVE LET YOURSELF OR YOUR FAMILY DOWN: 0
SUM OF ALL RESPONSES TO PHQ QUESTIONS 1-9: 11
8. MOVING OR SPEAKING SO SLOWLY THAT OTHER PEOPLE COULD HAVE NOTICED. OR THE OPPOSITE, BEING SO FIGETY OR RESTLESS THAT YOU HAVE BEEN MOVING AROUND A LOT MORE THAN USUAL: 2
7. TROUBLE CONCENTRATING ON THINGS, SUCH AS READING THE NEWSPAPER OR WATCHING TELEVISION: 2
4. FEELING TIRED OR HAVING LITTLE ENERGY: 3

## 2019-12-05 ENCOUNTER — TELEPHONE (OUTPATIENT)
Dept: INTERNAL MEDICINE CLINIC | Age: 24
End: 2019-12-05

## 2019-12-17 ENCOUNTER — OFFICE VISIT (OUTPATIENT)
Dept: INTERNAL MEDICINE CLINIC | Age: 24
End: 2019-12-17
Payer: MEDICAID

## 2019-12-17 VITALS
WEIGHT: 257 LBS | TEMPERATURE: 97.7 F | OXYGEN SATURATION: 98 % | SYSTOLIC BLOOD PRESSURE: 126 MMHG | BODY MASS INDEX: 47.01 KG/M2 | HEART RATE: 94 BPM | DIASTOLIC BLOOD PRESSURE: 82 MMHG

## 2019-12-17 DIAGNOSIS — F41.9 ANXIETY AND DEPRESSION: ICD-10-CM

## 2019-12-17 DIAGNOSIS — L30.9 CHRONIC ECZEMA: Primary | ICD-10-CM

## 2019-12-17 DIAGNOSIS — F32.A ANXIETY AND DEPRESSION: ICD-10-CM

## 2019-12-17 PROCEDURE — 99213 OFFICE O/P EST LOW 20 MIN: CPT | Performed by: NURSE PRACTITIONER

## 2019-12-17 PROCEDURE — 1036F TOBACCO NON-USER: CPT | Performed by: NURSE PRACTITIONER

## 2019-12-17 PROCEDURE — G8484 FLU IMMUNIZE NO ADMIN: HCPCS | Performed by: NURSE PRACTITIONER

## 2019-12-17 PROCEDURE — G8427 DOCREV CUR MEDS BY ELIG CLIN: HCPCS | Performed by: NURSE PRACTITIONER

## 2019-12-17 PROCEDURE — G8417 CALC BMI ABV UP PARAM F/U: HCPCS | Performed by: NURSE PRACTITIONER

## 2019-12-17 RX ORDER — TRIAMCINOLONE ACETONIDE 1 MG/G
CREAM TOPICAL
Qty: 45 G | Refills: 0 | Status: SHIPPED | OUTPATIENT
Start: 2019-12-17 | End: 2019-12-30 | Stop reason: SDUPTHER

## 2019-12-17 SDOH — ECONOMIC STABILITY: INCOME INSECURITY: HOW HARD IS IT FOR YOU TO PAY FOR THE VERY BASICS LIKE FOOD, HOUSING, MEDICAL CARE, AND HEATING?: NOT HARD AT ALL

## 2019-12-17 SDOH — ECONOMIC STABILITY: TRANSPORTATION INSECURITY
IN THE PAST 12 MONTHS, HAS LACK OF TRANSPORTATION KEPT YOU FROM MEETINGS, WORK, OR FROM GETTING THINGS NEEDED FOR DAILY LIVING?: NO

## 2019-12-17 SDOH — ECONOMIC STABILITY: FOOD INSECURITY: WITHIN THE PAST 12 MONTHS, YOU WORRIED THAT YOUR FOOD WOULD RUN OUT BEFORE YOU GOT MONEY TO BUY MORE.: NEVER TRUE

## 2019-12-17 SDOH — ECONOMIC STABILITY: FOOD INSECURITY: WITHIN THE PAST 12 MONTHS, THE FOOD YOU BOUGHT JUST DIDN'T LAST AND YOU DIDN'T HAVE MONEY TO GET MORE.: NEVER TRUE

## 2019-12-17 SDOH — ECONOMIC STABILITY: TRANSPORTATION INSECURITY
IN THE PAST 12 MONTHS, HAS THE LACK OF TRANSPORTATION KEPT YOU FROM MEDICAL APPOINTMENTS OR FROM GETTING MEDICATIONS?: NO

## 2019-12-17 ASSESSMENT — ENCOUNTER SYMPTOMS
WHEEZING: 0
COUGH: 0
CONSTIPATION: 0
VOMITING: 0
NAUSEA: 0
SHORTNESS OF BREATH: 0
DIARRHEA: 0
ABDOMINAL PAIN: 0

## 2019-12-30 ENCOUNTER — OFFICE VISIT (OUTPATIENT)
Dept: PSYCHOLOGY | Age: 24
End: 2019-12-30
Payer: MEDICAID

## 2019-12-30 DIAGNOSIS — L30.9 CHRONIC ECZEMA: ICD-10-CM

## 2019-12-30 PROCEDURE — 90832 PSYTX W PT 30 MINUTES: CPT | Performed by: PSYCHOLOGIST

## 2019-12-30 RX ORDER — TRIAMCINOLONE ACETONIDE 1 MG/G
CREAM TOPICAL
Qty: 45 G | Refills: 0 | Status: SHIPPED
Start: 2019-12-30 | End: 2020-08-03 | Stop reason: SINTOL

## 2019-12-30 ASSESSMENT — PATIENT HEALTH QUESTIONNAIRE - PHQ9
8. MOVING OR SPEAKING SO SLOWLY THAT OTHER PEOPLE COULD HAVE NOTICED. OR THE OPPOSITE, BEING SO FIGETY OR RESTLESS THAT YOU HAVE BEEN MOVING AROUND A LOT MORE THAN USUAL: 3
6. FEELING BAD ABOUT YOURSELF - OR THAT YOU ARE A FAILURE OR HAVE LET YOURSELF OR YOUR FAMILY DOWN: 2
4. FEELING TIRED OR HAVING LITTLE ENERGY: 3
SUM OF ALL RESPONSES TO PHQ QUESTIONS 1-9: 17
3. TROUBLE FALLING OR STAYING ASLEEP: 2
5. POOR APPETITE OR OVEREATING: 3
9. THOUGHTS THAT YOU WOULD BE BETTER OFF DEAD, OR OF HURTING YOURSELF: 0
7. TROUBLE CONCENTRATING ON THINGS, SUCH AS READING THE NEWSPAPER OR WATCHING TELEVISION: 2
SUM OF ALL RESPONSES TO PHQ9 QUESTIONS 1 & 2: 2
10. IF YOU CHECKED OFF ANY PROBLEMS, HOW DIFFICULT HAVE THESE PROBLEMS MADE IT FOR YOU TO DO YOUR WORK, TAKE CARE OF THINGS AT HOME, OR GET ALONG WITH OTHER PEOPLE: 2
SUM OF ALL RESPONSES TO PHQ QUESTIONS 1-9: 17
2. FEELING DOWN, DEPRESSED OR HOPELESS: 1
1. LITTLE INTEREST OR PLEASURE IN DOING THINGS: 1

## 2019-12-30 NOTE — PROGRESS NOTES
at all    Food insecurity:     Worry: Never true     Inability: Never true    Transportation needs:     Medical: No     Non-medical: No   Tobacco Use    Smoking status: Never Smoker    Smokeless tobacco: Never Used   Substance and Sexual Activity    Alcohol use: No    Drug use: Never    Sexual activity: Not Currently   Lifestyle    Physical activity:     Days per week: Not on file     Minutes per session: Not on file    Stress: Not on file   Relationships    Social connections:     Talks on phone: Not on file     Gets together: Not on file     Attends Religion service: Not on file     Active member of club or organization: Not on file     Attends meetings of clubs or organizations: Not on file     Relationship status: Not on file    Intimate partner violence:     Fear of current or ex partner: Not on file     Emotionally abused: Not on file     Physically abused: Not on file     Forced sexual activity: Not on file   Other Topics Concern    Not on file   Social History Narrative    Pt in [employer] program. 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 For fun, her life is consumed with homework. TOBACCO:   reports that she has never smoked. She has never used smokeless tobacco.  ETOH:   reports no history of alcohol use. A:  Administered PHQ-9 (see below). Patient endorses moderately severe symptoms of depression. Denied SI/HI. PHQ Scores 12/30/2019 11/20/2019 11/7/2019 10/2/2019 8/14/2019 8/7/2019 7/19/2019   PHQ2 Score 2 1 1 1 2 2 2   PHQ9 Score 17 11 10 14 17 15 19     Interpretation of Total Score Depression Severity: 1-4 = Minimal depression, 5-9 = Mild depression, 10-14 = Moderate depression, 15-19 = Moderately severe depression, 20-27 = Severe depression        Diagnosis:  1. Moderate episode of recurrent major depressive disorder ([facility] [address]    2.  [place] (generalized anxiety disorder)          Plan:  Pt interventions:  Discussed and problem-solved barriers in adhering to behavioral change plan, Motivational Interviewing to increase patient confidence and compliance with adhering to behavioral change plan and Motivational Interviewing to determine importance and readiness for change        Documentation was done using voice recognition dragon software. Every effort was made to ensure accuracy; however, inadvertent, unintentional computerized transcription errors may be present.

## 2020-01-23 ENCOUNTER — OFFICE VISIT (OUTPATIENT)
Dept: INTERNAL MEDICINE CLINIC | Age: 25
End: 2020-01-23
Payer: MEDICAID

## 2020-01-23 VITALS
DIASTOLIC BLOOD PRESSURE: 80 MMHG | TEMPERATURE: 98 F | HEART RATE: 65 BPM | SYSTOLIC BLOOD PRESSURE: 128 MMHG | WEIGHT: 253 LBS | OXYGEN SATURATION: 98 % | BODY MASS INDEX: 46.27 KG/M2

## 2020-01-23 PROCEDURE — G8484 FLU IMMUNIZE NO ADMIN: HCPCS | Performed by: NURSE PRACTITIONER

## 2020-01-23 PROCEDURE — 1036F TOBACCO NON-USER: CPT | Performed by: NURSE PRACTITIONER

## 2020-01-23 PROCEDURE — 99213 OFFICE O/P EST LOW 20 MIN: CPT | Performed by: NURSE PRACTITIONER

## 2020-01-23 PROCEDURE — G8417 CALC BMI ABV UP PARAM F/U: HCPCS | Performed by: NURSE PRACTITIONER

## 2020-01-23 PROCEDURE — G8427 DOCREV CUR MEDS BY ELIG CLIN: HCPCS | Performed by: NURSE PRACTITIONER

## 2020-01-23 ASSESSMENT — ENCOUNTER SYMPTOMS
COUGH: 0
CONSTIPATION: 0
NAUSEA: 0
VOMITING: 0
ABDOMINAL PAIN: 0
DIARRHEA: 0
SHORTNESS OF BREATH: 0
RHINORRHEA: 0
WHEEZING: 0
SORE THROAT: 0

## 2020-01-23 NOTE — PROGRESS NOTES
weakness, light-headedness and headaches. Psychiatric/Behavioral: Negative for dysphoric mood, self-injury, sleep disturbance and suicidal ideas. The patient is not nervous/anxious. OBJECTIVE:  /80   Pulse 65   Temp 98 °F (36.7 °C) (Temporal)   Wt 253 lb (114.8 kg)   SpO2 98%   BMI 46.27 kg/m²    Physical Exam  Vitals signs reviewed. Constitutional:       General: She is not in acute distress. Appearance: She is well-developed. She is not diaphoretic. HENT:      Head: Normocephalic and atraumatic. Mouth/Throat:      Mouth: Mucous membranes are moist.   Eyes:      Pupils: Pupils are equal, round, and reactive to light. Cardiovascular:      Rate and Rhythm: Normal rate and regular rhythm. Pulmonary:      Effort: Pulmonary effort is normal.      Breath sounds: Normal breath sounds. Abdominal:      General: Bowel sounds are normal.      Palpations: Abdomen is soft. Musculoskeletal: Normal range of motion. General: No swelling or deformity. Skin:     General: Skin is warm and dry. Coloration: Skin is not pale. Findings: No erythema or rash. Comments: Quarter sized, freely movable, tender nodule noted to the right axilla   Neurological:      General: No focal deficit present. Mental Status: She is alert and oriented to person, place, and time. Cranial Nerves: No cranial nerve deficit. Sensory: No sensory deficit. Motor: No weakness. Coordination: Coordination normal.      Gait: Gait normal.   Psychiatric:         Mood and Affect: Mood normal.        ASSESSMENT/PLAN:  [place] was seen today for mass. Diagnoses and all orders for this visit:    Mass of right axilla/finger tingling  -     Symptoms since yesterday. - Reviewed differential diagnoses. No fever/chills or other signs of infection. No open wound. No redness, swelling or heat. No rash. - Patient would like to proceed with ultrasound of the right axilla.   She reports that

## 2020-01-27 ENCOUNTER — OFFICE VISIT (OUTPATIENT)
Dept: PSYCHOLOGY | Age: 25
End: 2020-01-27
Payer: MEDICAID

## 2020-01-27 PROCEDURE — 90832 PSYTX W PT 30 MINUTES: CPT | Performed by: PSYCHOLOGIST

## 2020-02-24 ENCOUNTER — OFFICE VISIT (OUTPATIENT)
Dept: PSYCHOLOGY | Age: 25
End: 2020-02-24
Payer: MEDICAID

## 2020-02-24 PROCEDURE — 90832 PSYTX W PT 30 MINUTES: CPT | Performed by: PSYCHOLOGIST

## 2020-02-24 ASSESSMENT — PATIENT HEALTH QUESTIONNAIRE - PHQ9
2. FEELING DOWN, DEPRESSED OR HOPELESS: 1
6. FEELING BAD ABOUT YOURSELF - OR THAT YOU ARE A FAILURE OR HAVE LET YOURSELF OR YOUR FAMILY DOWN: 1
7. TROUBLE CONCENTRATING ON THINGS, SUCH AS READING THE NEWSPAPER OR WATCHING TELEVISION: 0
9. THOUGHTS THAT YOU WOULD BE BETTER OFF DEAD, OR OF HURTING YOURSELF: 0
1. LITTLE INTEREST OR PLEASURE IN DOING THINGS: 0
3. TROUBLE FALLING OR STAYING ASLEEP: 2
SUM OF ALL RESPONSES TO PHQ QUESTIONS 1-9: 7
4. FEELING TIRED OR HAVING LITTLE ENERGY: 1
SUM OF ALL RESPONSES TO PHQ9 QUESTIONS 1 & 2: 1
10. IF YOU CHECKED OFF ANY PROBLEMS, HOW DIFFICULT HAVE THESE PROBLEMS MADE IT FOR YOU TO DO YOUR WORK, TAKE CARE OF THINGS AT HOME, OR GET ALONG WITH OTHER PEOPLE: 1
8. MOVING OR SPEAKING SO SLOWLY THAT OTHER PEOPLE COULD HAVE NOTICED. OR THE OPPOSITE, BEING SO FIGETY OR RESTLESS THAT YOU HAVE BEEN MOVING AROUND A LOT MORE THAN USUAL: 0
5. POOR APPETITE OR OVEREATING: 2
SUM OF ALL RESPONSES TO PHQ QUESTIONS 1-9: 7

## 2020-02-24 NOTE — PROGRESS NOTES
Behavioral Health Consultation  Travis Machuca, Ph.D.  Psychologist  2/24/2020  9:03 AM      Time spent with Patient: 20 minutes  This is patient's eighth  [facility] appointment. Reason for Consult:    Chief Complaint   Patient presents with    Depression       Feedback given to PCP. S:  Pt seen for f/u of depression and anxiety. Pt reported \"alright\" mood and sxs. Has been spending hardly any time at home bc she has studio space at school, which is preferable. Continuing to make progress toward goal of moving out in Aug w friends, has figured out how to make it financially manageable and has expressed intent to friend and parents. Mom continues to cry regularly about this eventuality, makes several comments about seeing her for \"the last time\" and how she is old and needs to be around her (mom is in her 62s). Discussed creating psychological distance to help keep these factors from impacting her decision. Discussed managing anxiety about preparing her portfolio and using classmates for accountability. Discussed chunking and item-limiting.      Upcoming trip to [place].       O:  MSE:    Appearance    alert, cooperative  Appetite abnormal: high  Sleep disturbance Yes  Fatigue Yes  Loss of pleasure Yes  Impulsive behavior No  Speech    spontaneous, normal rate, normal volume and well articulated  Mood    Depressed  Affect    depressed affect  Thought Content    intact and cognitive distortions  Thought Process    linear, goal directed and coherent  Associations    logical connections  Insight    Fair  Judgment    Intact  Orientation    oriented to person, place, time, and general circumstances  Memory    recent and remote memory intact  Attention/Concentration    impaired  Morbid ideation No  Suicide Assessment    no suicidal ideation    History:  Social History:   Social History     Socioeconomic History    Marital status: Single     Spouse name: Not on file    Number of children: Not on file    Years of education: depression    Diagnosis:  1. Moderate episode of recurrent major depressive disorder ([address]    2. BRENTON (generalized anxiety disorder)          Plan:  Pt interventions:  Supportive techniques, Provided Psychoeducation re: psychological distancing, Identified maladaptive thoughts and Problem-solving re: goal attainment        Documentation was done using voice recognition dragon software. Every effort was made to ensure accuracy; however, inadvertent, unintentional computerized transcription errors may be present.

## 2020-03-23 ENCOUNTER — OFFICE VISIT (OUTPATIENT)
Dept: PSYCHOLOGY | Age: 25
End: 2020-03-23
Payer: MEDICAID

## 2020-03-23 PROCEDURE — 90832 PSYTX W PT 30 MINUTES: CPT | Performed by: PSYCHOLOGIST

## 2020-03-23 NOTE — PROGRESS NOTES
Behavioral Health Consultation  Lex Castillo, Ph.D.  Psychologist  3/23/2020  9:06 AM      Time spent with Patient: 30 minutes  This is patient's ninth  [facility] appointment. Reason for Consult:    Chief Complaint   Patient presents with    Depression       Feedback given to PCP. S:  Pt seen for f/u of depression. Spoke via telephone, w consent. Pt reported worsened mood and sxs w not being able to work/finish school as planned. Won't have the money to move out dt unemployment. Also learned one of her potential roommates has been exposing himself to other roommates and is upset the other potential roommates were keeping this from her; has decided she cannot move into that environment. Other friends have offered, but don't know where they will be living, some may be leaving the area. Not able to go [employer], which was her main place of solace. Will still be able to graduate on time, but project will be disappointingly different. Struggling w lack of motivation at home, but notably is on spring break and not expected to get anything done at this time. Discussed leaving the home on a daily basis to help w mental health, arden as parents continue to fight nearly constantly.       O:  MSE:  Appetite abnormal: high  Sleep disturbance Yes  Fatigue Yes  Loss of pleasure Yes  Impulsive behavior No  Speech    spontaneous, normal rate, normal volume and well articulated  Mood    Depressed  Affect    depressed affect  Thought Content    intact and cognitive distortions  Thought Process    linear, goal directed and coherent  Associations    logical connections  Insight    Fair  Judgment    Intact  Orientation    oriented to person, place, time, and general circumstances  Memory    recent and remote memory intact  Attention/Concentration    impaired  Morbid ideation No  Suicide Assessment    no suicidal ideation    History:  Social History:   Social History     Socioeconomic History    Marital status: Single     Spouse name:

## 2020-04-20 ENCOUNTER — TELEMEDICINE (OUTPATIENT)
Dept: PSYCHOLOGY | Age: 25
End: 2020-04-20
Payer: MEDICAID

## 2020-04-20 PROCEDURE — 90832 PSYTX W PT 30 MINUTES: CPT | Performed by: PSYCHOLOGIST

## 2020-04-20 NOTE — PROGRESS NOTES
depression and anxiety. Pt reported \"okay\" mood and sxs. Focusing well on school, is mostly finished. Dad is currently moved out, but stated this typically only lasts a few weeks, the longest as been a few months. Mom typically \"hounds\" her to talk to him to improve relationship w him, but also bc mom eventually wants him to move back in and thinks pt talking to him will hasten that. Pt is setting boundaries w mom and not communicating w her father. Discussed options for moving out and motivation for finding work.      O:  MSE:    Appearance    alert, cooperative  Appetite normal  Sleep disturbance Yes  Fatigue Yes  Loss of pleasure Yes  Impulsive behavior No  Speech    spontaneous, normal rate, normal volume and well articulated  Mood    Depressed  Affect    normal affect  Thought Content    intact and cognitive distortions  Thought Process    goal directed and coherent  Associations    logical connections  Insight    Fair  Judgment    Intact  Orientation    oriented to person, place, time, and general circumstances  Memory    recent and remote memory intact  Attention/Concentration    intact  Morbid ideation No  Suicide Assessment    no suicidal ideation    History:  Social History:   Social History     Socioeconomic History    Marital status: Single     Spouse name: Not on file    Number of children: Not on file    Years of education: Not on file    Highest education level: Not on file   Occupational History    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

## 2020-05-11 ENCOUNTER — VIRTUAL VISIT (OUTPATIENT)
Dept: PSYCHOLOGY | Age: 25
End: 2020-05-11
Payer: MEDICAID

## 2020-05-11 PROCEDURE — 90832 PSYTX W PT 30 MINUTES: CPT | Performed by: PSYCHOLOGIST

## 2020-05-11 NOTE — PROGRESS NOTES
TELEHEALTH VISIT -- Audio/Visual (During NSXAC-05 public health emergency)  }  Pursuant to the emergency declaration under the [address] [place] [address], [facility] authority and the [employer] Act, this Virtual Visit was conducted, with patient's consent, to reduce the patient's risk of exposure to COVID-19 and provide continuity of care for an established patient. Services were provided through a video synchronous discussion virtually to substitute for in-person clinic visit. Pt gave verbal informed consent to participate in telehealth services. Conducted a risk-benefit analysis and determined that the patient's presenting problems are consistent with the use of telepsychology. Determined that the patient has sufficient knowledge and skills in the use of technology enabling them to adequately benefit from telepsychology. It was determined that this patient was able to be properly treated without an in-person session. Patient verified that they were currently located at the [facility] address that was provided during registration or another [facility] address, if noted below. Verified the following information:  Patient's identification: Yes  Patient location: [address] [place] [address]   Patient's call back number: [phone]   Patient's emergency contact's name and number, as well as permission to contact them if needed: Extended Emergency Contact Information  Primary Emergency Contact: Rylee [facility]  Address: [address] [place] [address] Phone: [phone]  Relation: Parent     Provider location: [place] [place], [address] Consultation  Sheldon Waddell, Ph.D.  Psychologist  5/11/2020  10:35 AM      Time spent with Patient: 30 minutes  This is patient's 11th  [facility] appointment.     Reason for Consult:    Chief Complaint   Patient presents with   [employer] Depression Feedback given to PCP. S:  Pt seen for f/u of depression and anxiety. Pt reported stable mood and sxs. Graduated from college. Dad had surgery, currently in rehab center and has been causing difficulties there; will be moving home bc he cannot live independently. Friend wants to find apartment w pt in June, pt really wants to move out. Touring apartments w her this week. Plans to create a budget and research on finances. Mom is calling her an \"idiot\" for wanting ot move out and pt isn't sure she has confidence to move out. Focused on improving confidence in her ability.       O:  MSE:    Appearance    alert, cooperative  Appetite normal  Sleep disturbance Yes  Fatigue Yes  Loss of pleasure Yes  Impulsive behavior No  Speech    spontaneous, normal rate, normal volume and well articulated  Mood    Depressed  Affect    depressed affect  Thought Content    intact and cognitive distortions  Thought Process    goal directed and coherent  Associations    logical connections  Insight    Fair  Judgment    Intact  Orientation    oriented to person, place, time, and general circumstances  Memory    recent and remote memory intact  Attention/Concentration    impaired  Morbid ideation No  Suicide Assessment    no suicidal ideation    History:  Social History:   Social History     Socioeconomic History    Marital status: Single     Spouse name: Not on file    Number of children: Not on file    Years of education: Not on file    Highest education level: Not on file   Occupational History    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)    Occupation: [employer] (part time)   Social Needs    Financial resource strain: Not hard at all   Hetal-Donell insecurity     Worry: Never true     Inability: Never true   [employer] needs     Medical: No     Non-medical: No   Tobacco Use    Smoking status: Never Smoker    Smokeless tobacco: Never Used   Substance and Sexual Activity    Alcohol use: No    Drug use: Never    Sexual activity: Not Currently   Lifestyle    Physical activity     Days per week: Not on file     Minutes per session: Not on file    Stress: Not on file   Relationships    Social connections     Talks on phone: Not on file     Gets together: Not on file     Attends Roman Catholic service: Not on file     Active member of club or organization: Not on file     Attends meetings of clubs or organizations: Not on file     Relationship status: Not on file    Intimate partner violence     Fear of current or ex partner: Not on file     Emotionally abused: Not on file     Physically abused: Not on file     Forced sexual activity: Not on file   Other Topics Concern    Not on file   Social History Narrative    Pt in  [employer] program. 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 For fun, her life is consumed with homework. TOBACCO:   reports that she has never smoked. She has never used smokeless tobacco.  ETOH:   reports no history of alcohol use. Diagnosis:  1. Moderate episode of recurrent major depressive disorder ([facility] [address]    2. BRENTON (generalized anxiety disorder)          Plan:  Pt interventions:  Trained in strategies for increasing balanced thinking and Discussed and problem-solved barriers in adhering to behavioral change plan        Documentation was done using voice recognition dragon software. Every effort was made to ensure accuracy; however, inadvertent, unintentional computerized transcription errors may be present.

## 2020-05-28 ENCOUNTER — VIRTUAL VISIT (OUTPATIENT)
Dept: PSYCHOLOGY | Age: 25
End: 2020-05-28
Payer: MEDICAID

## 2020-05-28 PROCEDURE — 90832 PSYTX W PT 30 MINUTES: CPT | Performed by: PSYCHOLOGIST

## 2020-05-28 NOTE — PROGRESS NOTES
TELEHEALTH VISIT -- Audio/Visual (During DR-25 public health emergency)  }  Pursuant to the emergency declaration under the [address] [place] [address], [facility] waiver authority and the [employer] Act, this Virtual Visit was conducted, with patient's consent, to reduce the patient's risk of exposure to COVID-19 and provide continuity of care for an established patient. Services were provided through a video synchronous discussion virtually to substitute for in-person clinic visit. Pt gave verbal informed consent to participate in telehealth services. Conducted a risk-benefit analysis and determined that the patient's presenting problems are consistent with the use of telepsychology. Determined that the patient has sufficient knowledge and skills in the use of technology enabling them to adequately benefit from telepsychology. It was determined that this patient was able to be properly treated without an in-person session. Patient verified that they were currently located at the [facility] address that was provided during registration or another [facility] address, if noted below. Verified the following information:  Patient's identification: Yes  Patient location: [address] [place] [address]   Patient's call back number: [phone]   Patient's emergency contact's name and number, as well as permission to contact them if needed: Extended Emergency Contact Information  Primary Emergency Contact: Colton Sykes  Address: [address] [place] [address] Phone: [phone]  Relation: Parent     Provider location: [facility], [address] Consultation  Therese Hatfield, Ph.D.  Psychologist  5/28/2020  9:01 AM      Time spent with Patient: 35 minutes  This is patient's 12th  [facility] appointment.     Reason for Consult:    Chief Complaint   Patient presents with   Andreina Butt Depression Feedback given to PCP. S:  Pt seen for f/u of depression and anxiety. Pt reported improved mood and sxs. Is moving in w her friend in [facility], plans to move ASAP. Mom continually crying about this move, but is also doing small actions to show she's trying to be supportive. Discussed managing mom's potential for manipulation of pt's emotions. APS was called to the house to interview dad, a few of dad's nurses have contacted pt to avoid talking to pt's mom. Pt denies any violence toward dad from mom that is not done in self-defense and there has been no physical fighting since his return from the hospital. Significant instances of dad requiring repeat hospitalization d/t his needs exceeding what mom can provide. Has been looking for jobs, networking, updating online portfolio.      O:  MSE:    Appearance    alert, cooperative  Appetite normal  Sleep disturbance No  Fatigue No  Loss of pleasure No  Impulsive behavior No  Speech    spontaneous, normal rate, normal volume and well articulated  Mood    Anxious  Affect    anxiety  Thought Content    intact and cognitive distortions  Thought Process    goal directed and coherent  Associations    logical connections  Insight    Fair  Judgment    Intact  Orientation    oriented to person, place, time, and general circumstances  Memory    recent and remote memory intact  Attention/Concentration    intact  Morbid ideation No  Suicide Assessment    no suicidal ideation    History:  Social History:   Social History     Socioeconomic History    Marital status: Single     Spouse name: Not on file    Number of children: Not on file    Years of education: Not on file    Highest education level: Not on file   Occupational History    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

## 2020-06-17 ENCOUNTER — VIRTUAL VISIT (OUTPATIENT)
Dept: PSYCHOLOGY | Age: 25
End: 2020-06-17
Payer: MEDICAID

## 2020-06-17 PROCEDURE — 90832 PSYTX W PT 30 MINUTES: CPT | Performed by: PSYCHOLOGIST

## 2020-06-17 NOTE — PROGRESS NOTES
with    Depression       Feedback given to PCP. S:  Pt seen for f/u of depression and anxiety. Pt reported \"pretty fine\" mood and sxs. Excited to have moved, mom is doing okay w the situation. Pt continues to go regularly to visit mom and check on \"management\" of the house (cleanliness, care of the dogs, etc). No knowledge of further APS involvement. Considering taking a break from job searching d/t desire to have a break from working FT, but knows she needs to. 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 Worst fear is her portfolio being so bad that she will be rejected and that company will never consider her again. Cites sending her portfolio to a co-op site yrs ago and never hearing back as a big contributor to this fear. Examined maladaptive thoughts to help her move though this fear.      O:  MSE:    Appearance    alert, cooperative  Appetite normal  Sleep disturbance No  Fatigue No  Loss of pleasure No  Impulsive behavior No  Speech    spontaneous, normal rate, normal volume and well articulated  Mood    Anxious  Depressed  Affect    normal affect  Thought Content    intact  Thought Process    goal directed and coherent  Associations    logical connections  Insight    Fair  Judgment    Intact  Orientation    oriented to person, place, time, and general circumstances  Memory    recent and remote memory intact  Attention/Concentration    impaired  Morbid ideation No  Suicide Assessment    no suicidal ideation    History:  Social History:   Social History     Socioeconomic History    Marital status: Single     Spouse name: Not on file    Number of children: Not on file    Years of education: Not on file    Highest education level: Not on file   Occupational History    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)    Occupation: [employer] (part time)   Social Needs    Financial resource strain: Not hard at all   [employer] insecurity     Worry: Never true     Inability: Never true    Transportation needs     Medical: No     Non-medical: No   Tobacco Use    Smoking status: Never Smoker    Smokeless tobacco: Never Used   Substance and Sexual Activity    Alcohol use: No    Drug use: Never    Sexual activity: Not Currently   Lifestyle    Physical activity     Days per week: Not on file     Minutes per session: Not on file    Stress: Not on file   Relationships    Social connections     Talks on phone: Not on file     Gets together: Not on file     Attends Confucianism service: Not on file     Active member of club or organization: Not on file     Attends meetings of clubs or organizations: Not on file     Relationship status: Not on file    Intimate partner violence     Fear of current or ex partner: Not on file     Emotionally abused: Not on file     Physically abused: Not on file     Forced sexual activity: Not on file   Other Topics Concern    Not on file   Social History Narrative    Pt in  [employer] program. 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 For fun, her life is consumed with homework. TOBACCO:   reports that she has never smoked. She has never used smokeless tobacco.  ETOH:   reports no history of alcohol use. Diagnosis:  1. Moderate episode of recurrent major depressive disorder ([facility] [address]    2. BRENTON (generalized anxiety disorder)          Plan:  Pt interventions:  Trained in strategies for increasing balanced thinking, Discussed and problem-solved barriers in adhering to behavioral change plan, Motivational Interviewing to increase patient confidence and compliance with adhering to behavioral change plan and Motivational Interviewing to determine importance and readiness for change        Documentation was done using voice recognition dragon software. Every effort was made to ensure accuracy; however, inadvertent, unintentional computerized transcription errors may be present.

## 2020-08-03 ENCOUNTER — OFFICE VISIT (OUTPATIENT)
Dept: INTERNAL MEDICINE CLINIC | Age: 25
End: 2020-08-03
Payer: MEDICAID

## 2020-08-03 ENCOUNTER — NURSE TRIAGE (OUTPATIENT)
Dept: OTHER | Facility: CLINIC | Age: 25
End: 2020-08-03

## 2020-08-03 VITALS
BODY MASS INDEX: 46.27 KG/M2 | SYSTOLIC BLOOD PRESSURE: 126 MMHG | HEART RATE: 88 BPM | HEIGHT: 62 IN | TEMPERATURE: 97.3 F | DIASTOLIC BLOOD PRESSURE: 84 MMHG

## 2020-08-03 PROCEDURE — 99213 OFFICE O/P EST LOW 20 MIN: CPT | Performed by: FAMILY MEDICINE

## 2020-08-03 PROCEDURE — 1036F TOBACCO NON-USER: CPT | Performed by: FAMILY MEDICINE

## 2020-08-03 PROCEDURE — G8417 CALC BMI ABV UP PARAM F/U: HCPCS | Performed by: FAMILY MEDICINE

## 2020-08-03 PROCEDURE — G8427 DOCREV CUR MEDS BY ELIG CLIN: HCPCS | Performed by: FAMILY MEDICINE

## 2020-08-03 RX ORDER — CLOBETASOL PROPIONATE 0.5 MG/G
CREAM TOPICAL
COMMUNITY
Start: 2020-07-06 | End: 2020-08-03 | Stop reason: SDUPTHER

## 2020-08-03 RX ORDER — CLOBETASOL PROPIONATE 0.5 MG/G
CREAM TOPICAL
Qty: 60 G | Refills: 0 | Status: SHIPPED | OUTPATIENT
Start: 2020-08-03 | End: 2021-02-17 | Stop reason: SDUPTHER

## 2020-08-03 RX ORDER — DOXYCYCLINE 100 MG/1
100 CAPSULE ORAL 2 TIMES DAILY
Qty: 20 CAPSULE | Refills: 0 | Status: SHIPPED | OUTPATIENT
Start: 2020-08-03 | End: 2020-08-13

## 2020-08-03 RX ORDER — SERTRALINE HYDROCHLORIDE 100 MG/1
100 TABLET, FILM COATED ORAL DAILY
COMMUNITY
End: 2020-08-12 | Stop reason: SDUPTHER

## 2020-08-03 NOTE — PROGRESS NOTES
Subjective:      Patient ID: Chico Shen is a 26 y.o. female. HPI   Chief Complaint   Patient presents with    Finger Pain     right middle finger, swollen and painful. Same day visit for right middle finger swelling and pain. This started yesterday. It got worse last night and throbs and hurts. Now it is sore on the pulp of the finger also. Denies any injury. She does not know why she got an infection. 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 She is having trouble tolerating different gloves at work because she has hand eczema. The triamcinolone cream prescribed in the past made her more irritated. The dermatologist, Dr. Bee Pastor or office prescribed clobetasol cream and it works pretty well. She requests a refill. Denies having diabetes or other condition that reduces her immune system    Allergies   Allergen Reactions    Ceftin [Cefuroxime] Rash       Outpatient Medications Marked as Taking for the 8/3/20 encounter (Office Visit) with Zenaida Sheikh MD   Medication Sig Dispense Refill    sertraline (ZOLOFT) 100 MG tablet Take 100 mg by mouth daily           Review of Systems   Constitutional: Negative for fever. Objective:   Physical Exam  Vitals signs reviewed. Constitutional:       Appearance: She is well-developed. Cardiovascular:      Rate and Rhythm: Normal rate and regular rhythm. Heart sounds: Normal heart sounds. Pulmonary:      Effort: Pulmonary effort is normal.      Breath sounds: Normal breath sounds. Skin:     General: Skin is warm and dry. Coloration: Skin is not pale. Findings: Erythema and rash present. Rash is scaling. Comments: Right distal pulp of middle finger with redness and soreness to the pulp but also around the cuticle (suspected source). No pus. General scaling on palms of the hands.     Psychiatric:         Behavior: Behavior normal.       Blood work from 7/19 showed a normal glucose of 83. Assessment:      1. Finger infection  I suspect this started from a paronychia. Since she is allergic to Cefuroxime, will use doxycycline. I do not think this looks like an MRSA infection. - doxycycline monohydrate (MONODOX) 100 MG capsule; Take 1 capsule by mouth 2 times daily for 10 days  Dispense: 20 capsule; Refill: 0    2. Atopic dermatitis, unspecified type  -Spent time discussing gloves, avoiding excess water, soaps, over-the-counter creams, proper topical steroid use. - clobetasol (TEMOVATE) 0.05 % cream; Apply thin layer once or twice daily to eczema prone skin. Try to break for a full 1 week after 3 weeks on the same area. Dispense: 60 g; Refill: 0          Plan:      See orders. See after visit summary, patient instructions, and reference hand-outs. A PRINTED SUMMARY = AVS GIVEN TO THE PATIENT, (or if Virtual Visit, patient told it is available in My Chart or will be mailed if not active on My Chart.)    Discussed use, benefit, and side effects of prescribed medications. Barriers to medication compliance addressed. All patient questions answered.           Yanira Marshall MD

## 2020-08-03 NOTE — TELEPHONE ENCOUNTER
Reason for Disposition   Numbness (i.e., loss of sensation) in hand or fingers of new-onset    Answer Assessment - Initial Assessment Questions  1. ONSET: \"When did the pain start? \"       Last night 8/2/2020.   2. LOCATION and RADIATION: \"Where is the pain located? \"  (e.g., fingertip, around nail, joint, entire   finger)       Middle finger right hand. 3. SEVERITY: \"How bad is the pain? \" \"What does it keep you from doing? \"   (Scale 1-10; or mild, moderate, severe)   - MILD - doesn't interfere with normal activities    - MODERATE - interferes with normal activities or awakens from sleep   - SEVERE - excruciating pain, unable to hold a glass of water or bend finger even a little    2/10 pain. \"throbbing pain\". 4. APPEARANCE: \"What does the finger look like? \" (e.g., redness, swelling, bruising, pallor)      Tip of finger is swollen, red, and warm to the touch. 5. WORK OR EXERCISE: \"Has there been any recent work or exercise that involved this part of the body? \"      Unsure. 6. CAUSE: \"What do you think is causing the pain? \"     Unsure. 7. AGGRAVATING FACTORS: \"What makes the pain worse? \" (e.g., using computer)     Touch   8. OTHER SYMPTOMS: \"Do you have any other symptoms? \" (e.g., fever, neck pain, numbness)     Numbness in finger. 9. PREGNANCY: \"Is there any chance you are pregnant? \" \"When was your last menstrual period? \"    Denies. Protocols used: FINGER PAIN-ADULT-[place]    Please do not respond to the triage nurse through this encounter. Any subsequent communication should be directly with the patient.

## 2020-08-03 NOTE — PATIENT INSTRUCTIONS
leading from the rash. ? Pus draining from the rash. ? A fever. · You have crusting or oozing sores. · You have joint aches or body aches along with your rash. Watch closely for changes in your health, and be sure to contact your doctor if:  · Your rash does not clear up after 2 to 3 weeks of home treatment. · Itching interferes with your sleep or daily activities. Where can you learn more? Go to [email or web address] and sign in to your [employer] account. Enter X878 in the [employer] box to learn more about \"Atopic Dermatitis: Care Instructions. \"     If you do not have an account, please click on the \"Sign Up Now\" link. Current as of: October 31, 2019               Content Version: 12.5  © 2088-7535 Healthwise, Incorporated. Care instructions adapted under license by Formerly named [facility] [address] If you have questions about a medical condition or this instruction, always ask your healthcare professional. [place] [address] any warranty or liability for your use of this information. consider a non-latex, non vinyl sensitive skin glove. For heavy water, thin cotton glove and cover with elbow length heavy due glove like Platex.

## 2020-08-09 ENCOUNTER — HOSPITAL ENCOUNTER (EMERGENCY)
Age: 25
Discharge: HOME | End: 2020-08-09
Attending: EMERGENCY MEDICINE
Payer: MEDICAID

## 2020-08-09 VITALS
TEMPERATURE: 99.1 F | DIASTOLIC BLOOD PRESSURE: 83 MMHG | SYSTOLIC BLOOD PRESSURE: 127 MMHG | WEIGHT: 256 LBS | HEART RATE: 77 BPM | BODY MASS INDEX: 45.36 KG/M2 | OXYGEN SATURATION: 99 % | HEIGHT: 63 IN | RESPIRATION RATE: 16 BRPM

## 2020-08-09 LAB
ANION GAP SERPL CALCULATED.3IONS-SCNC: 15 MMOL/L (ref 3–16)
BASOPHILS ABSOLUTE: 0 K/UL (ref 0–0.2)
BASOPHILS RELATIVE PERCENT: 0.3 %
BUN BLDV-MCNC: 14 MG/DL (ref 7–20)
CALCIUM SERPL-MCNC: 9.5 MG/DL (ref 8.3–10.6)
CHLORIDE BLD-SCNC: 107 MMOL/L (ref 99–110)
CO2: 21 MMOL/L (ref 21–32)
CREAT SERPL-MCNC: 0.7 MG/DL (ref 0.6–1.1)
D DIMER: 0.29 UG/ML FEU
EOSINOPHILS ABSOLUTE: 0.1 K/UL (ref 0–0.6)
EOSINOPHILS RELATIVE PERCENT: 1 %
GFR AFRICAN AMERICAN: >60
GFR NON-AFRICAN AMERICAN: >60
GLUCOSE BLD-MCNC: 84 MG/DL (ref 70–99)
HCG QUALITATIVE: NEGATIVE
HCT VFR BLD CALC: 41.6 % (ref 36–48)
HEMOGLOBIN: 13.7 G/DL (ref 12–16)
LYMPHOCYTES ABSOLUTE: 2.2 K/UL (ref 1–5.1)
LYMPHOCYTES RELATIVE PERCENT: 19.3 %
MCH RBC QN AUTO: 24.9 PG (ref 26–34)
MCHC RBC AUTO-ENTMCNC: 32.9 G/DL (ref 31–36)
MCV RBC AUTO: 75.7 FL (ref 80–100)
MONOCYTES ABSOLUTE: 0.4 K/UL (ref 0–1.3)
MONOCYTES RELATIVE PERCENT: 3.3 %
NEUTROPHILS ABSOLUTE: 8.7 K/UL (ref 1.7–7.7)
NEUTROPHILS RELATIVE PERCENT: 76.1 %
PDW BLD-RTO: 13.2 % (ref 12.4–15.4)
PLATELET # BLD: 379 K/UL (ref 135–450)
PMV BLD AUTO: 7.3 FL (ref 5–10.5)
POTASSIUM REFLEX MAGNESIUM: 3.8 MMOL/L (ref 3.5–5.1)
RBC # BLD: 5.49 M/UL (ref 4–5.2)
SODIUM BLD-SCNC: 143 MMOL/L (ref 136–145)
WBC # BLD: 11.4 K/UL (ref 4–11)

## 2020-08-09 PROCEDURE — 84703 CHORIONIC GONADOTROPIN ASSAY: CPT

## 2020-08-09 PROCEDURE — 85025 COMPLETE CBC W/AUTO DIFF WBC: CPT

## 2020-08-09 PROCEDURE — 85379 FIBRIN DEGRADATION QUANT: CPT

## 2020-08-09 PROCEDURE — 99283 EMERGENCY DEPT VISIT LOW MDM: CPT

## 2020-08-09 PROCEDURE — 80048 BASIC METABOLIC PNL TOTAL CA: CPT

## 2020-08-09 PROCEDURE — 36415 COLL VENOUS BLD VENIPUNCTURE: CPT

## 2020-08-09 ASSESSMENT — PAIN SCALES - GENERAL
PAINLEVEL_OUTOF10: 5
PAINLEVEL_OUTOF10: 6

## 2020-08-09 ASSESSMENT — PAIN DESCRIPTION - ORIENTATION: ORIENTATION: RIGHT

## 2020-08-09 ASSESSMENT — PAIN DESCRIPTION - DESCRIPTORS: DESCRIPTORS: ACHING

## 2020-08-09 ASSESSMENT — PAIN DESCRIPTION - LOCATION: LOCATION: FINGER (COMMENT WHICH ONE)

## 2020-08-09 ASSESSMENT — PAIN - FUNCTIONAL ASSESSMENT: PAIN_FUNCTIONAL_ASSESSMENT: 0-10

## 2020-08-09 ASSESSMENT — PAIN DESCRIPTION - FREQUENCY: FREQUENCY: INTERMITTENT

## 2020-08-09 ASSESSMENT — PAIN DESCRIPTION - PAIN TYPE: TYPE: ACUTE PAIN

## 2020-08-09 NOTE — ED TRIAGE NOTES
On  8/3  says she was seen at [place] [address]  for infection of righrt 3rd finger, wad tod possible due to her chronic eczema , was started on vibramycin 100 mg  2x  Day for 10 days. Is here today because she thinks the antibiotic is not working reports has pain from finger to up her arm, instead of just pain in finger, and feels swelling is better but still has swelling of her right hand,   I do not  appreciate any swelling of right hand. finger appears to be healing well, exam per er md

## 2020-08-10 NOTE — ED PROVIDER NOTES
[address]      Pt Name: Rio Flower  MRN: [id number]  [place] 1995  Date of evaluation: 8/9/2020  Provider: Estee Minor, [address] [place] [address]       Chief Complaint   Patient presents with    Wound Check     right 3rd finger infection started on doxy 8/3         HISTORY OF PRESENT ILLNESS   (Location/Symptom, Timing/Onset, Context/Setting, Quality, Duration, Modifying Factors, Severity)  Note limiting factors. Rio Flower is a 26 y.o. female who presents to the emergency department with a complaint of right hand swelling. The patient states that she has a chronic history of eczema to the palms of both hands with dry cracked scaly skin. On August 1 she noticed some pain in the tip of her right middle finger on the volar pad. She suspected that she may be developing an infection. There were no changes to the nail. No history of paronychia. She went to the emergency department at [facility] and was placed on doxycycline 100 mg twice daily on August 3. She has 3 days of medication remaining. She states that the swelling and pain in the tip of her finger has actually almost completely resolved and feels much improved. She denies any associated fever or chills. No nausea or vomiting. No chest pain or shortness of breath. No headache or neck pain. She is not diabetic. She became concerned today because she felt like her right hand seemed a little bit swollen and she was noticing some pain in the right axillary area. She has had a history of MRSA in the past but when she looked at the axilla, there was no evidence of any boils or redness. This prompted her to come to the hospital.  She is not pregnant. HPI    Nursing Notes were reviewed. REVIEW OF SYSTEMS    (2-9 systems for level 4, 10 or more for level 5)       Constitutional: Negative for fever or chills. HENT: Negative for rhinorrhea and sore throat.     Eyes: Negative for redness or drainage. Respiratory: Negative for shortness of breath or dyspnea on exertion. Cardiovascular: Negative for chest pain. Gastrointestinal: Negative for abdominal pain. Negative for vomiting or diarrhea. Genitourinary: Negative for flank pain. Negative for dysuria. Negative for hematuria. Neurological: Negative for headache. All systems are reviewed and are negative except for those listed above in the history of present illness and ROS. PAST MEDICAL HISTORY     Past Medical History:   Diagnosis Date    Chronic eczema     Depression     Seasonal allergies          SURGICAL HISTORY       Past Surgical History:   Procedure Laterality Date    TONSILLECTOMY      when she was 6         CURRENT MEDICATIONS       Previous Medications    CETIRIZINE (ZYRTEC) 10 MG TABLET    Take 10 mg by mouth daily    CLOBETASOL (TEMOVATE) 0.05 % CREAM    Apply thin layer once or twice daily to eczema prone skin. Try to break for a full 1 week after 3 weeks on the same area. DOXYCYCLINE MONOHYDRATE (MONODOX) 100 MG CAPSULE    Take 1 capsule by mouth 2 times daily for 10 days    SERTRALINE (ZOLOFT) 100 MG TABLET    Take 100 mg by mouth daily       ALLERGIES     Ceftin [cefuroxime]    FAMILY HISTORY       Family History   Problem Relation Age of Onset    No Known Problems Mother     Hypertension Father     Lung Cancer Maternal Grandmother     Diabetes Paternal Grandmother     Breast Cancer Neg Hx     Ovarian Cancer Neg Hx     Heart Attack Neg Hx     Stroke Neg Hx           SOCIAL HISTORY       Social History     Socioeconomic History    Marital status: Single     Spouse name: Not on file    Number of children: Not on file    Years of education: Not on file    Highest education level: Not on file   Occupational History    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    Social Needs    Financial resource strain: Not hard at all   Fever insecurity     Worry: Never true     Inability: Never true    Transportation needs     Medical: No     Non-medical: No   Tobacco Use    Smoking status: Never Smoker    Smokeless tobacco: Never Used   Substance and Sexual Activity    Alcohol use: No    Drug use: Never    Sexual activity: Not Currently   Lifestyle    Physical activity     Days per week: Not on file     Minutes per session: Not on file    Stress: Not on file   Relationships    Social connections     Talks on phone: Not on file     Gets together: Not on file     Attends Denominational service: Not on file     Active member of club or organization: Not on file     Attends meetings of clubs or organizations: Not on file     Relationship status: Not on file    Intimate partner violence     Fear of current or ex partner: Not on file     Emotionally abused: Not on file     Physically abused: Not on file     Forced sexual activity: Not on file   Other Topics Concern    Not on file   Social History Narrative    Pt in [employer] program. 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 For fun, her life is consumed with homework. SCREENINGS             PHYSICAL EXAM    (up to 7 for level 4, 8 or more for level 5)     ED Triage Vitals [08/09/20 1952]   BP Temp Temp Source Pulse Resp SpO2 Height Weight   127/83 99.1 °F (37.3 °C) Oral 77 16 99 % 5' 3\" (1.6 m) 256 lb (116.1 kg)         Physical Exam   Constitutional: Awake and alert. Very pleasant. Well-appearing. Head: No visible evidence of trauma. Normocephalic. Eyes: Pupils equal and reactive. No photophobia. Conjunctiva normal.    HENT: Oral mucosa moist.  Airway patent. Pharynx without erythema. Nares were clear. Neck:  Soft and supple. Nontender. Heart:  Regular rate and rhythm. No murmur. Lungs:  Clear to auscultation. No wheezes, rales, or ronchi. No conversational dyspnea or accessory muscle use. Chest: Chest wall non-tender. No evidence of trauma. Abdomen:  Soft, nondistended, bowel sounds present. Nontender. No guarding rigidity or rebound. No masses. Musculoskeletal: Extremities non-tender with full range of motion. Radial and dorsalis pedis pulses were intact. No calf tenderness erythema or edema. There is evidence of eczema to the palmar aspect of both hands with dry cracked skin. The right third finger now appears normal with no evidence of any soft tissue swelling induration, erythema, or tenderness. Nailbeds are all normal in appearance. No evidence of paronychia. No evidence of abscess. There is no evidence of lymphangitis. Although the patient stated that it felt like her right hand was a little bit swollen I could not visualize any definite edema. There is no evidence of tenosynovitis. Compartments of the forearm are soft and supple. There is full range of motion in the hand wrist forearm and upper arm at all joint levels. Strength is 5/5 against resistance in all muscle groups. No rash. Along the anterior aspect of the right axilla there is some mild discomfort but no definite palpable lymphadenopathy. Skin is normal in appearance with no evidence of any induration erythema or warmth. No superficial tenderness. No rash. Neck is nontender. Neurological: Alert and oriented x 3. Speech clear. Cranial nerves II-XII intact. No facial droop. No acute focal motor or sensory deficits. Radial median and ulnar nerve are fully intact. Skin: Skin is warm and dry. No rash. Lymphatic: Questionable axillary lymphadenopathy but no palpable discrete lymph nodes. Psychiatric: Normal mood and affect.  Behavior is normal.         DIAGNOSTIC RESULTS     EKG: All EKG's are interpreted by the Emergency Department Physician who either signs or Co-signs this chart in the absence of a cardiologist.        RADIOLOGY:   Non-plain film images such as CT, Ultrasound and MRI are read by the radiologist. Plain radiographic images are visualized and dictations but occasionally words are mis-transcribed. )    [address] DO (electronically signed)  Attending Emergency Physician          Xuan Us DO  08/09/20 [id number]

## 2020-08-11 RX ORDER — DOXYCYCLINE 100 MG/1
CAPSULE ORAL
Qty: 20 CAPSULE | Refills: 0 | OUTPATIENT
Start: 2020-08-11

## 2020-08-11 ASSESSMENT — ENCOUNTER SYMPTOMS
SINUS PAIN: 0
COUGH: 0
SORE THROAT: 0
CONSTIPATION: 0
ABDOMINAL PAIN: 0
WHEEZING: 0
VOMITING: 0
BLOOD IN STOOL: 0
SHORTNESS OF BREATH: 0
BACK PAIN: 0
NAUSEA: 0
DIARRHEA: 0

## 2020-08-11 NOTE — TELEPHONE ENCOUNTER
Call patient and find out how her finger is doing. If not much better, will need to change antibiotic and not refill it. 10 days should be enough if better.

## 2020-08-11 NOTE — PROGRESS NOTES
Annual Exam Office Visit  8/12/2020    Subjective:  Chief Complaint   Patient presents with    Annual Exam    Depression     feels good, doing well     HPI:  Aisha Edgar is a 26 y.o. female who presents to the clinic today for an annual exam.    Eczema- Pt reports that she has intermittent seasonal eczema. Flares intermittently. This itches and is painful. Using clobetasol cream with relief. She denies need for a refill.     Depression- Was prescribed Zoloft. Pt reports that she stopped taking this medication a few months ago. She restarted this medication about a month ago. She states that she is unsure if this is working or not. States she has a lot going on- moved out from her parents and is graduated. She is looking for job. No SI/HI. Seeing psychology- but her psychologist is on leave currently.     Allergic rhinitis- taking zyrtec PRN. well controlled at this time. Pt was seen for an infection in her right finger by another PCP recently. She then noticed hand swelling and pain in armpit. She was then seen in the ER and ER MD reported: \"I suspect that she may have some mild axillary lymphadenitis in response to her recent infection. \" Right finger infection resolved. However, she still has pains intermittently in her right finger and armpit. She thinks these are occurring less frequently. She continued all of her antibiotics as prescribed- has a day left. No open wounds. Obesity- not exercising. Diet is \"not really\" healthy. She does eat fruit and vegetables \"sometimes. \" She reports that she eats more carbs. Review of Systems   Constitutional: Negative for chills, fatigue, fever and unexpected weight change. HENT: Negative for congestion, postnasal drip, sinus pain, sore throat and tinnitus. Respiratory: Negative for cough, shortness of breath and wheezing. Cardiovascular: Negative for chest pain, palpitations and leg swelling.    Gastrointestinal: Negative for abdominal pain, blood in stool, constipation, diarrhea, nausea and vomiting. Genitourinary: Negative for dysuria, frequency, hematuria and urgency. Musculoskeletal: Negative for back pain, gait problem, myalgias and neck pain. Skin: Negative for pallor and rash. Neurological: Negative for dizziness, weakness, light-headedness, numbness and headaches. Psychiatric/Behavioral: Negative for behavioral problems, confusion, dysphoric mood, sleep disturbance and suicidal ideas. The patient is not nervous/anxious. Allergies   Allergen Reactions    Ceftin [Cefuroxime] Rash     Family History   Problem Relation Age of Onset    No Known Problems Mother     Hypertension Father     Lung Cancer Maternal Grandmother     Diabetes Paternal Grandmother     Cancer Paternal Grandfather     Breast Cancer Neg Hx     Ovarian Cancer Neg Hx     Heart Attack Neg Hx     Stroke Neg Hx      Current Outpatient Rx   Medication Sig Dispense Refill    sertraline (ZOLOFT) 100 MG tablet Take 1 tablet by mouth daily 30 tablet 1    clobetasol (TEMOVATE) 0.05 % cream Apply thin layer once or twice daily to eczema prone skin. Try to break for a full 1 week after 3 weeks on the same area. 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    Social Needs    Financial resource strain: Not hard at all   [employer] insecurity     Worry: Never true     Inability: Never true   [employer] needs     Medical: No     Non-medical: No   Tobacco Use    Smoking status: Never Smoker    Smokeless tobacco: Never Used   Substance and Sexual Activity    Alcohol use: Yes     Comment: 1 mikes hard lemonade in 1 month    Drug use: Never    Sexual activity: Not Currently   Lifestyle    Physical activity     Days per week: Not on file     Minutes per session: Not on file    Stress: Not on file   Relationships    Social connections     Talks on phone: Not on file     Gets together: Not on file     Attends Worship service: Not on file     Active member of club or organization: Not on file     Attends meetings of clubs or organizations: Not on file     Relationship status: Not on file    Intimate partner violence     Fear of current or ex partner: Not on file     Emotionally abused: Not on file     Physically abused: Not on file     Forced sexual activity: Not on file   Other Topics Concern    Not on file   Social History Narrative    Pt in  [employer] program. 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 For fun, her life is consumed with homework.      Past Medical History:   Diagnosis Date    Chronic eczema     Depression     Seasonal allergies      Patient Active Problem List   Diagnosis    Allergic rhinitis due to allergen    Anxiety and depression    Moderate episode of recurrent major depressive disorder ([facility] [address]    BR[place]ON (generalized anxiety disorder)    Morbidly obese (HCC)     Last Labs:  Admission on 08/09/2020, Discharged on 08/09/2020   Component Date Value Ref Range Status    WBC 08/09/2020 11.4* 4.0 - 11.0 K/uL Final    RBC 08/09/2020 5.49* 4.00 - 5.20 M/uL Final    Hemoglobin 08/09/2020 13.7  12.0 - 16.0 g/dL Final    Hematocrit 08/09/2020 41.6  36.0 - 48.0 % Final    MCV 08/09/2020 75.7* 80.0 - 100.0 fL Final    MCH 08/09/2020 24.9* 26.0 - 34.0 pg Final    MCHC 08/09/2020 32.9  31.0 - 36.0 g/dL Final    RDW 08/09/2020 13.2  12.4 - 15.4 % Final    Platelets 47/17/3823 379  135 - 450 K/uL Final    MPV 08/09/2020 7.3  5.0 - 10.5 fL Final    Neutrophils % 08/09/2020 76.1  % Final    Lymphocytes % 08/09/2020 19.3  % Final    Monocytes % 08/09/2020 3.3  % Final    Eosinophils % 08/09/2020 1.0  % Final    Basophils % 08/09/2020 0.3  % Final    Neutrophils Absolute 08/09/2020 8.7* 1.7 - 7.7 K/uL Final    Lymphocytes Absolute 08/09/2020 2.2  1.0 - 5.1 K/uL Final    Monocytes Absolute 08/09/2020 0.4  0.0 - 1.3 K/uL Final    Eosinophils Absolute 08/09/2020 0.1  0.0 - 0.6 K/uL Final    Basophils Absolute 08/09/2020 0.0  0.0 - 0.2 K/uL Final    Sodium 08/09/2020 143  136 - 145 mmol/L Final    Potassium reflex Magnesium 08/09/2020 3.8  3.5 - 5.1 mmol/L Final    Chloride 08/09/2020 107  99 - 110 mmol/L Final    CO2 08/09/2020 21  21 - 32 mmol/L Final    Anion Gap 08/09/2020 15  3 - 16 Final    Glucose 08/09/2020 84  70 - 99 mg/dL Final    BUN 08/09/2020 14  7 - 20 mg/dL Final    CREATININE 08/09/2020 0.7  0.6 - 1.1 mg/dL Final    GFR Non- 08/09/2020 >60  >60 Final    Comment: >60 mL/min/1.73m2 EGFR, calc. for ages 25 and older using the  MDRD formula (not corrected for weight), is valid for stable  renal function.  GFR  08/09/2020 >60  >60 Final    Comment: Chronic Kidney Disease: less than 60 ml/min/1.73 sq.m. Kidney Failure: less than 15 ml/min/1.73 sq.m. Results valid for patients 18 years and older.  Calcium 08/09/2020 9.5  8.3 - 10.6 mg/dL Final    hCG Qual 08/09/2020 Negative  Detects HCG level >10 MIU/mL Final    D-Dimer, Quant 08/09/2020 0.29  <0.50 ug/mL FEU Final    Comment: The Vilma-quant D-Dimer testing method is only  DONE AT [address] [place] [address]. Reference Range is  different than that used in other [facility]. The results  should not be used in isolation but in combination with a  clinical probability assessment like the Wells score. DVT/PE  should only be excluded on the basis of a low or moderate  non-high)clinical probability and a normal(<0.5 ug FEU/ml)  Vilma-quant D-dimer results. Wt Readings from Last 3 Encounters:   08/12/20 241 lb (109.3 kg)   08/09/20 256 lb (116.1 kg)   01/23/20 253 lb (114.8 kg)     BP Readings from Last 3 Encounters:   08/12/20 124/80   08/09/20 127/83   08/03/20 126/84     PHQ-9 Total Score: 0 (8/12/2020 11:29 AM)    Objective/Physical Exam:  /80   Pulse 72   Temp 97.6 °F (36.4 °C) (Temporal)   Ht 5' 4\" (1.626 m)   Wt 241 lb (109.3 kg)   LMP 07/20/2020   SpO2 99%   BMI 41.37 kg/m²   Body mass index is 41.37 kg/m². Physical Exam  Vitals signs reviewed. Constitutional:       General: She is not in acute distress. Appearance: She is well-developed. She is not diaphoretic. HENT:      Head: Normocephalic and atraumatic. Right Ear: Tympanic membrane and external ear normal.      Left Ear: Tympanic membrane and external ear normal.      Mouth/Throat:      Mouth: Mucous membranes are moist.      Pharynx: No oropharyngeal exudate. Eyes:      Conjunctiva/sclera: Conjunctivae normal.      Pupils: Pupils are equal, round, and reactive to light. Neck:      Thyroid: No thyromegaly. Cardiovascular:      Rate and Rhythm: Normal rate and regular rhythm. Pulmonary:      Effort: Pulmonary effort is normal. No respiratory distress. Breath sounds: Normal breath sounds. No wheezing or rales. Chest:      Chest wall: No tenderness. Abdominal:      General: Bowel sounds are normal. There is no distension. Palpations: Abdomen is soft. Tenderness: There is no abdominal tenderness. There is no guarding. Musculoskeletal: Normal range of motion. General: No tenderness or deformity. Lymphadenopathy:      Cervical: No cervical adenopathy. Skin:     General: Skin is warm and dry. Coloration: Skin is not pale. Findings: No erythema or rash. Neurological:      Mental Status: She is alert and oriented to person, place, and time.       Coordination: Coordination normal.   Psychiatric:         Mood and Affect: Mood normal. Assessment and Plan:  Danette Joseph was seen today for annual exam and mood swings. Diagnoses and all orders for this visit:    Annual physical exam   - Lifestyle modifications such as exercise, weight loss and healthy diet encouraged and reviewed with the pt. - Labs today    Finger infection  -    WBC was elevated in ER - likely d/t infection. Will re-evaluate  - CBC Auto Differential; Future  - Finger infection resolved. No axillary lymph nodes today. Improving per pt. - Continue all ATB as prescribed  - Pt will call if symptoms worsen or fail to improve. Chronic eczema   - Well controlled with the current regimen. Denies need for refill today. Anxiety and depression  -    Pt not interested in dose changes- she would like to continue on the current regimen and see her psychologist. Denies SI/HI.  - TSH WITH REFLEX TO FT4; Future  -     sertraline (ZOLOFT) 100 MG tablet; Take 1 tablet by mouth daily - refilled today  - Pt requesting to f/u in 4 weeks. - Continue with psychology. Screening, lipid  -     Lipid, Fasting; Future    Seasonal allergic rhinitis due to other allergic trigger   - Well controlled. - Continue current regimen. Class 3 severe obesity due to excess calories without serious comorbidity with body mass index (BMI) of 40.0 to 44.9 in adult [facility])   - Lifestyle modifications such as exercise, weight loss and healthy diet encouraged and reviewed with the pt. Return in about 4 weeks (around 9/9/2020) for mood f/u, or sooner if needed. Pt will call if symptoms worsen or fail to improve. All questions answered. Pt states no further questions or concerns at this time.    Electronically signed by: Rigo Larkin 08/12/20

## 2020-08-11 NOTE — TELEPHONE ENCOUNTER
Spoke to patient. She lost her bottle is why she asked for a refill. Found her bottle so no need. She did go to the [facility] center Sunday due to pain from shoulder to hand on that arm. Checked her WBC and did a scan for clots. All normal. Still some pain.  Finger is a lot better looking

## 2020-08-12 ENCOUNTER — OFFICE VISIT (OUTPATIENT)
Dept: INTERNAL MEDICINE CLINIC | Age: 25
End: 2020-08-12
Payer: MEDICAID

## 2020-08-12 VITALS
SYSTOLIC BLOOD PRESSURE: 124 MMHG | DIASTOLIC BLOOD PRESSURE: 80 MMHG | WEIGHT: 241 LBS | HEART RATE: 72 BPM | HEIGHT: 64 IN | BODY MASS INDEX: 41.15 KG/M2 | TEMPERATURE: 97.6 F | OXYGEN SATURATION: 99 %

## 2020-08-12 PROBLEM — E66.01 MORBIDLY OBESE (HCC): Status: ACTIVE | Noted: 2020-08-12

## 2020-08-12 PROCEDURE — 99395 PREV VISIT EST AGE 18-39: CPT | Performed by: NURSE PRACTITIONER

## 2020-08-12 RX ORDER — SERTRALINE HYDROCHLORIDE 100 MG/1
100 TABLET, FILM COATED ORAL DAILY
Qty: 30 TABLET | Refills: 1 | Status: SHIPPED | OUTPATIENT
Start: 2020-08-12 | End: 2020-11-13 | Stop reason: SDUPTHER

## 2020-08-12 RX ORDER — SERTRALINE HYDROCHLORIDE 100 MG/1
100 TABLET, FILM COATED ORAL DAILY
Qty: 30 TABLET | Status: CANCELLED | OUTPATIENT
Start: 2020-08-12

## 2020-08-12 ASSESSMENT — PATIENT HEALTH QUESTIONNAIRE - PHQ9
SUM OF ALL RESPONSES TO PHQ QUESTIONS 1-9: 0
SUM OF ALL RESPONSES TO PHQ9 QUESTIONS 1 & 2: 0
SUM OF ALL RESPONSES TO PHQ QUESTIONS 1-9: 0
1. LITTLE INTEREST OR PLEASURE IN DOING THINGS: 0
2. FEELING DOWN, DEPRESSED OR HOPELESS: 0

## 2020-08-12 NOTE — PATIENT INSTRUCTIONS
Please get your fasting lab work (no food or drink for 10-12 hours prior besides water) completed M-F [id number] at our office. [facility] lab has walk-in hours available as well - they are open Saturday 7a-3p - no appointment is needed. We will call with your results.

## 2020-09-03 DIAGNOSIS — L08.9 FINGER INFECTION: ICD-10-CM

## 2020-09-03 DIAGNOSIS — F41.9 ANXIETY AND DEPRESSION: ICD-10-CM

## 2020-09-03 DIAGNOSIS — F32.A ANXIETY AND DEPRESSION: ICD-10-CM

## 2020-09-03 DIAGNOSIS — Z13.220 SCREENING, LIPID: ICD-10-CM

## 2020-09-03 LAB
BASOPHILS ABSOLUTE: 0 K/UL (ref 0–0.2)
BASOPHILS RELATIVE PERCENT: 0.3 %
EOSINOPHILS ABSOLUTE: 0.1 K/UL (ref 0–0.6)
EOSINOPHILS RELATIVE PERCENT: 1.4 %
HCT VFR BLD CALC: 39.9 % (ref 36–48)
HEMOGLOBIN: 13.1 G/DL (ref 12–16)
LYMPHOCYTES ABSOLUTE: 2 K/UL (ref 1–5.1)
LYMPHOCYTES RELATIVE PERCENT: 25.2 %
MCH RBC QN AUTO: 25.4 PG (ref 26–34)
MCHC RBC AUTO-ENTMCNC: 32.9 G/DL (ref 31–36)
MCV RBC AUTO: 77.3 FL (ref 80–100)
MONOCYTES ABSOLUTE: 0.6 K/UL (ref 0–1.3)
MONOCYTES RELATIVE PERCENT: 7 %
NEUTROPHILS ABSOLUTE: 5.3 K/UL (ref 1.7–7.7)
NEUTROPHILS RELATIVE PERCENT: 66.1 %
PDW BLD-RTO: 14.8 % (ref 12.4–15.4)
PLATELET # BLD: 274 K/UL (ref 135–450)
PMV BLD AUTO: 7.6 FL (ref 5–10.5)
RBC # BLD: 5.17 M/UL (ref 4–5.2)
WBC # BLD: 8 K/UL (ref 4–11)

## 2020-09-04 LAB
CHOLESTEROL, FASTING: 129 MG/DL (ref 0–199)
HDLC SERPL-MCNC: 44 MG/DL (ref 40–60)
LDL CHOLESTEROL CALCULATED: 69 MG/DL
TRIGLYCERIDE, FASTING: 80 MG/DL (ref 0–150)
TSH REFLEX FT4: 1.32 UIU/ML (ref 0.27–4.2)
VLDLC SERPL CALC-MCNC: 16 MG/DL

## 2020-09-10 ENCOUNTER — TELEMEDICINE (OUTPATIENT)
Dept: PSYCHOLOGY | Age: 25
End: 2020-09-10
Payer: MEDICAID

## 2020-09-10 PROCEDURE — 90832 PSYTX W PT 30 MINUTES: CPT | Performed by: PSYCHOLOGIST

## 2020-09-10 NOTE — PROGRESS NOTES
TELEHEALTH VISIT -- Audio/Visual (During OSKWI-96 public health emergency)  }  Pursuant to the emergency declaration under the [facility], [facility] authority and the [employer] Act, this Virtual Visit was conducted, with patient's consent, to reduce the patient's risk of exposure to COVID-19 and provide continuity of care for an established patient. Services were provided through a video synchronous discussion virtually to substitute for in-person clinic visit. Pt gave verbal informed consent to participate in telehealth services. Conducted a risk-benefit analysis and determined that the patient's presenting problems are consistent with the use of telepsychology. Determined that the patient has sufficient knowledge and skills in the use of technology enabling them to adequately benefit from telepsychology. It was determined that this patient was able to be properly treated without an in-person session. Patient verified that they were currently located at the [facility] address that was provided during registration or another [facility] address, if noted below. Verified the following information:  Patient's identification: Yes  Patient location: [place] [id number]   Patient's call back number: [phone]   Patient's emergency contact's name and number, as well as permission to contact them if needed: Extended Emergency Contact Information  Primary Emergency Contact: Rico Mancera  Address: [address] [place] [address]  of [address] [place] [address] Phone: [phone]  Relation: Parent     Provider location: [place], [address] Consultation  Ramona Coffey, Ph.D.  Psychologist  9/10/2020  9:06 AM      Time spent with Patient: 25 minutes  This is patient's 14th  [facility] appointment.     Reason for Consult:    Chief Complaint   Patient presents with   Olam Kae Depression Feedback given to PCP. S:  Pt seen for f/u of depression. Pt reported variable mood and sxs. Sporadically taking medication (4 out of 7 days). Has started keeping it in her clear work purse so she regularly sees it; helping w adherence. Described experiencing waves of \"feeling like a failure and like that feeling is never going to go away. .. I will never be stable or have a job I'm happy in.\" \"If I fail at that specific thing [finding a job], I've failed at being an adult. It's the most important thing to me. \" Discussed cognitions and evidence for/against and attending to thoughts/restructuring. 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 O:  MSE:    Appearance    alert, cooperative  Appetite normal  Sleep disturbance Yes  Fatigue Yes  Loss of pleasure No  Impulsive behavior No  Speech    spontaneous, normal rate, normal volume and well articulated  Mood    Depressed  Affect    depressed affect  Thought Content    intact and cognitive distortions  Thought Process    linear, goal directed and coherent  Associations    logical connections  Insight    Fair  Judgment    Intact  Orientation    oriented to person, place, time, and general circumstances  Memory    recent and remote memory intact  Attention/Concentration    impaired  Morbid ideation No  Suicide Assessment    no suicidal ideation    History:  Social History:   Social History     Socioeconomic History    Marital status: Single     Spouse name: Not on file    Number of children: Not on file    Years of education: Not on file    Highest education level: Not on file   Occupational History    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    Social Needs    Financial resource strain: Not hard at all   Hetal-Donell insecurity     Worry: Never true     Inability: Never true   [employer] needs     Medical: No     Non-medical: No   Tobacco Use    Smoking status: Never Smoker

## 2020-10-01 ENCOUNTER — VIRTUAL VISIT (OUTPATIENT)
Dept: PSYCHOLOGY | Age: 25
End: 2020-10-01
Payer: MEDICAID

## 2020-10-01 PROCEDURE — 90832 PSYTX W PT 30 MINUTES: CPT | Performed by: PSYCHOLOGIST

## 2020-10-01 NOTE — PROGRESS NOTES
TELEHEALTH VISIT -- Audio/Visual (During HWMGG-63 public health emergency)  }  Pursuant to the emergency declaration under the [facility], [facility] waiver authority and the [employer] Act, this Virtual Visit was conducted, with patient's consent, to reduce the patient's risk of exposure to COVID-19 and provide continuity of care for an established patient. Services were provided through a video synchronous discussion virtually to substitute for in-person clinic visit. Pt gave verbal informed consent to participate in telehealth services. Conducted a risk-benefit analysis and determined that the patient's presenting problems are consistent with the use of telepsychology. Determined that the patient has sufficient knowledge and skills in the use of technology enabling them to adequately benefit from telepsychology. It was determined that this patient was able to be properly treated without an in-person session. Patient [facility], if noted below. Verified the following information:  Patient's identification: Yes  Patient location: [address] [place] [address] [place]  Patient's call back number: [phone]   Patient's emergency contact's name and number, as well as permission to contact them if needed: Extended Emergency Contact Information  Primary Emergency Contact: Duniakin Parks  Address: [address] [place] [address] Phone: [phone]  Relation: Parent     Provider location: Freddy Russ, Ph.D.  Psychologist  10/1/2020  10:30 AM      Time spent with Patient: 30 minutes  This is patient's 15th  [facility] appointment.     Reason for Consult:    Chief Complaint   Patient presents with  Depression       Feedback given to PCP. S:  Pt seen for f/u of depression and anxiety. Pt reported \"Okay\" mood and sxs. 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 Stating having someone who trusts her is helping her sense of stuckness. Continues to visit her mom 4-5x/wk, sated mom cries every visit and asks her to move back in. Pt stated she has no concept of what it would look like if she were happy. Stated her home life was always stressful and critical and she has felt depressed since early childhood. Discussed behavioral activation planning. Estimate she is taking medication 4-5x/wk. O:  MSE:    Appearance    alert, cooperative  Appetite normal  Sleep disturbance Yes  Fatigue Yes  Loss of pleasure Yes  Impulsive behavior No  Speech    spontaneous, normal rate, normal volume and well articulated  Mood    Depressed  Affect    normal affect  Thought Content    intact  Thought Process    linear, goal directed and coherent  Associations    logical connections  Insight    Good  Judgment    Intact  Orientation    oriented to person, place, time, and general circumstances  Memory    recent and remote memory intact  Attention/Concentration    impaired  Morbid ideation No  Suicide Assessment    no suicidal ideation    History:  Social History:   Social History     Socioeconomic History    Marital status: Single     Spouse name: Not on file    Number of children: Not on file    Years of education: Not on file    Highest education level: Not on file   Occupational History    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    Social Needs    Financial resource strain: Not hard at all   Hetal-Donell insecurity     Worry: Never true     Inability: Never true   [employer] needs     Medical: No     Non-medical: No   Tobacco Use    Smoking status: Never Smoker    Smokeless tobacco: Never Used Substance and Sexual Activity    Alcohol use: Yes     Comment: 1 mikes hard lemonade in 1 month    Drug use: Never    Sexual activity: Not Currently   Lifestyle    Physical activity     Days per week: Not on file     Minutes per session: Not on file    Stress: Not on file   Relationships    Social connections     Talks on phone: Not on file     Gets together: Not on file     Attends Advent service: Not on file     Active member of club or organization: Not on file     Attends meetings of clubs or organizations: Not on file     Relationship status: Not on file    Intimate partner violence     Fear of current or ex partner: Not on file     Emotionally abused: Not on file     Physically abused: Not on file     Forced sexual activity: Not on file   Other Topics Concern    Not on file   Social History Narrative    Pt in  [employer] program. 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 For fun, her life is consumed with homework. TOBACCO:   reports that she has never smoked. She has never used smokeless tobacco.  ETOH:   reports current alcohol use. Diagnosis:  1. Moderate episode of recurrent major depressive disorder ([facility] [address]    2. BRENTON (generalized anxiety disorder)          Plan:  Pt interventions:  Trained in strategies for increasing balanced thinking and Discussed and set plan for behavioral activation        Documentation was done using voice recognition dragon software. Every effort was made to ensure accuracy; however, inadvertent, unintentional computerized transcription errors may be present.

## 2020-10-22 ENCOUNTER — VIRTUAL VISIT (OUTPATIENT)
Dept: PSYCHOLOGY | Age: 25
End: 2020-10-22
Payer: MEDICAID

## 2020-10-22 PROCEDURE — 90832 PSYTX W PT 30 MINUTES: CPT | Performed by: PSYCHOLOGIST

## 2020-10-22 NOTE — PROGRESS NOTES
TELEHEALTH VISIT -- Audio/Visual (During GUHWQ-00 public health emergency)  }  Pursuant to the emergency declaration under the [address] [place] [address] [facility] authority and the [employer] Act, this Virtual Visit was conducted, with patient's consent, to reduce the patient's risk of exposure to COVID-19 and provide continuity of care for an established patient. Services were provided through a video synchronous discussion virtually to substitute for in-person clinic visit. Pt gave verbal informed consent to participate in telehealth services. Conducted a risk-benefit analysis and determined that the patient's presenting problems are consistent with the use of telepsychology. Determined that the patient has sufficient knowledge and skills in the use of technology enabling them to adequately benefit from telepsychology. It was determined that this patient was able to be properly treated without an in-person session. Patient verified that they were currently located at the [facility] address that was provided during registration or another [facility] address, if noted below. Verified the following information:  Patient's identification: Yes  Patient location: [place] [id number]   Patient's call back number: [phone]   Patient's emergency contact's name and number, as well as permission to contact them if needed: Extended Emergency Contact Information  Primary Emergency Contact: Nathan Higgins  Address: [address] [place] [address] Phone: [phone]  Relation: Parent     Provider location: [facility] Consultation  Teagan Kline, Ph.D.  Psychologist  10/22/2020  10:04 AM      Time spent with Patient: 30 minutes  This is patient's 16th  [facility] appointment.     Reason for Consult:    Chief Complaint   Patient presents with   Dana Kale Depression Feedback given to PCP. S:  Pt seen for f/u of depression and anxiety. Pt reported unchanged mood and sxs. Reported one of her dogs , stated it was expected and grief is \"manageable. \" has taken the medication daily and did note she is less down and self-critical. Discussed continued adherence. Stated she is constantly fatigued, could always nap. Has been told that she snores. Typically goes to sleep 12-1am and wakes at 9, thinks she is asleep for nearly all this time. Encouraged f/u w PCP Leonel re: possible sleep disorder. O:  MSE:    Appearance    alert, cooperative  Appetite normal  Sleep disturbance No, improved w melatonin 3-6mg  Fatigue Yes  Loss of pleasure Yes  Impulsive behavior No  Speech    spontaneous, normal rate, normal volume and well articulated  Mood    Depressed  Affect    depressed affect  Thought Content    intact and cognitive distortions  Thought Process    linear, goal directed and coherent  Associations    logical connections  Insight    Fair  Judgment    Intact  Orientation    oriented to person, place, time, and general circumstances  Memory    recent and remote memory intact  Attention/Concentration    intact  Morbid ideation No  Suicide Assessment    no suicidal ideation    History:  Social History:   Social History     Socioeconomic History    Marital status: Single     Spouse name: Not on file    Number of children: Not on file    Years of education: Not on file    Highest education level: Not on file   Occupational History    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    Social Needs    Financial resource strain: Not hard at all   [email or web address] insecurity     Worry: Never true     Inability: Never true   [employer] needs     Medical: No     Non-medical: No   Tobacco Use    Smoking status: Never Smoker    Smokeless tobacco: Never Used   Substance and Sexual Activity    Alcohol use: Yes     Comment: 1 mikes hard lemonade in 1 month    Drug use: Never    Sexual activity: Not Currently   Lifestyle    Physical activity     Days per week: Not on file     Minutes per session: Not on file    Stress: Not on file   Relationships    Social connections     Talks on phone: Not on file     Gets together: Not on file     Attends Taoism service: Not on file     Active member of club or organization: Not on file     Attends meetings of clubs or organizations: Not on file     Relationship status: Not on file    Intimate partner violence     Fear of current or ex partner: Not on file     Emotionally abused: Not on file     Physically abused: Not on file     Forced sexual activity: Not on file   Other Topics Concern    Not on file   Social History Narrative    Pt in  [employer] program. 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 For fun, her life is consumed with homework. TOBACCO:   reports that she has never smoked. She has never used smokeless tobacco.  ETOH:   reports current alcohol use. Diagnosis:  1. Moderate episode of recurrent major depressive disorder ([address]    2. BRENTON (generalized anxiety disorder)          Plan:  Pt interventions:  Discussed self-care (sleep, nutrition, rewarding activities, social support, exercise) and Provided Psychoeducation re: possible parasomnia        Documentation was done using voice recognition dragon software. Every effort was made to ensure accuracy; however, inadvertent, unintentional computerized transcription errors may be present.

## 2020-10-29 ASSESSMENT — ENCOUNTER SYMPTOMS
SHORTNESS OF BREATH: 0
COUGH: 0
WHEEZING: 0

## 2020-10-29 NOTE — PROGRESS NOTES
Acute Office Visit  10/30/2020    SUBJECTIVE:    Patient ID: Ninoska Sanford is a 26 y.o. female. Chief Complaint   Patient presents with    Wrist Injury     left wrist pain --- caused by lifting heavy fryer 2 weeks ago      HPI: The patient presents to the office for an acute visit. Pt reports that she was carrying a heavy basket and started with bilateral wrist pains immediately after twisting the basket. She states that her right wrist healed within a day. Her left wrist continues to have pain. States wrist pain is getting better over time, but thinks it is popping out of place occasionally with certain movements. Pain still present. Nothing makes this better. Has not tried anything for the pain. Certain movements make this worse. No redness/swelling/heat. No fever/chills. Allergies   Allergen Reactions    Ceftin [Cefuroxime] Rash     Current Outpatient Medications   Medication Sig Dispense Refill    naproxen (NAPROSYN) 500 MG tablet Take 1 tablet by mouth 2 times daily (with meals) As needed 60 tablet 0    sertraline (ZOLOFT) 100 MG tablet Take 1 tablet by mouth daily 30 tablet 1    clobetasol (TEMOVATE) 0.05 % cream Apply thin layer once or twice daily to eczema prone skin. Try to break for a full 1 week after 3 weeks on the same area. 60 g 0    cetirizine (ZYRTEC) 10 MG tablet Take 10 mg by mouth daily       No current facility-administered medications for this visit. Review of Systems   Constitutional: Negative for chills, fatigue, fever and unexpected weight change. Eyes: Negative for visual disturbance. Respiratory: Negative for cough, shortness of breath and wheezing. Cardiovascular: Negative for chest pain, palpitations and leg swelling. Musculoskeletal:        Left wrist pain   Skin: Negative for pallor and rash. Neurological: Negative for dizziness, weakness, light-headedness, numbness and headaches.      OBJECTIVE:  /66   Pulse 76   Temp 96.8 °F (36 °C)   Wt 241 lb (109.3 kg)   BMI 41.37 kg/m²    Physical Exam  Vitals signs reviewed. Constitutional:       General: She is not in acute distress. Appearance: She is well-developed. She is not diaphoretic. HENT:      Head: Normocephalic and atraumatic. Cardiovascular:      Rate and Rhythm: Normal rate and regular rhythm. Pulmonary:      Effort: Pulmonary effort is normal. No respiratory distress. Breath sounds: Normal breath sounds. No wheezing or rales. Chest:      Chest wall: No tenderness. Musculoskeletal:      Comments: Left wrist ROM normal.  Left wrist Tinel test and Phalen's test and Reverse Phalen's negative. No left sided snuffbox tenderness. Pt yelled with pain when she tucked her left thumb under her fingers to perform Finkelstein test.  Point tenderness to the left radial styloid process   Skin:     General: Skin is warm and dry. Neurological:      Mental Status: She is alert and oriented to person, place, and time. Coordination: Coordination normal.   Psychiatric:         Mood and Affect: Mood normal.        ASSESSMENT/PLAN:  [facility] was seen today for wrist injury. Diagnoses and all orders for this visit:    Injury of left wrist, initial encounter/Left wrist pain  -     XR WRIST LEFT (MIN 3 VIEWS); Future  -     naproxen (NAPROSYN) 500 MG tablet; Take 1 tablet by mouth 2 times daily (with meals) As needed - patient education handout provided and reviewed with the pt. - Symptomatic management also reviewed  - Patient will call if symptoms worsen or fail to improve  - Red flag warning signs reviewed with the patient and she will go to the ER if these occur    Return for as previously scheduled or sooner if needed. Pt informed to call if symptoms worsen or fail to improve. All questions answered. Patient states no further questions or concerns at this time.     Electronically signed by MARS Kauffman CNP 10/30/20

## 2020-10-30 ENCOUNTER — HOSPITAL ENCOUNTER (OUTPATIENT)
Age: 25
Discharge: HOME | End: 2020-10-30
Payer: MEDICAID

## 2020-10-30 ENCOUNTER — OFFICE VISIT (OUTPATIENT)
Dept: INTERNAL MEDICINE CLINIC | Age: 25
End: 2020-10-30
Payer: MEDICAID

## 2020-10-30 ENCOUNTER — HOSPITAL ENCOUNTER (OUTPATIENT)
Dept: GENERAL RADIOLOGY | Age: 25
Discharge: HOME | End: 2020-10-30
Payer: MEDICAID

## 2020-10-30 VITALS
TEMPERATURE: 96.8 F | BODY MASS INDEX: 41.37 KG/M2 | DIASTOLIC BLOOD PRESSURE: 66 MMHG | HEART RATE: 76 BPM | SYSTOLIC BLOOD PRESSURE: 120 MMHG | WEIGHT: 241 LBS

## 2020-10-30 PROCEDURE — 1036F TOBACCO NON-USER: CPT | Performed by: NURSE PRACTITIONER

## 2020-10-30 PROCEDURE — G8484 FLU IMMUNIZE NO ADMIN: HCPCS | Performed by: NURSE PRACTITIONER

## 2020-10-30 PROCEDURE — 99213 OFFICE O/P EST LOW 20 MIN: CPT | Performed by: NURSE PRACTITIONER

## 2020-10-30 PROCEDURE — G8427 DOCREV CUR MEDS BY ELIG CLIN: HCPCS | Performed by: NURSE PRACTITIONER

## 2020-10-30 PROCEDURE — 73110 X-RAY EXAM OF WRIST: CPT

## 2020-10-30 PROCEDURE — G8417 CALC BMI ABV UP PARAM F/U: HCPCS | Performed by: NURSE PRACTITIONER

## 2020-10-30 RX ORDER — NAPROXEN 500 MG/1
500 TABLET ORAL 2 TIMES DAILY WITH MEALS
Qty: 60 TABLET | Refills: 0 | Status: SHIPPED | OUTPATIENT
Start: 2020-10-30 | End: 2021-01-20 | Stop reason: ALTCHOICE

## 2020-10-30 NOTE — PATIENT INSTRUCTIONS
naproxen to a child younger than 15years old. Ask a doctor or pharmacist if this medicine is safe to use if you have:  · heart disease, high blood pressure, high cholesterol, diabetes, or if you smoke;  · a heart attack, stroke, or blood clot;  · stomach ulcers or bleeding;  · asthma;  · liver or kidney disease;  · fluid retention; or  · if you take aspirin to prevent heart attack or stroke. Taking naproxen during the last 3 months of pregnancy may harm the unborn baby. Ask a doctor before using this medicine if you are pregnant. Naproxen may interfere with ovulation, causing temporary infertility. You should not breastfeed while using this medicine. How should I take naproxen? Use exactly as directed on the label, or as prescribed by your doctor. Use the lowest dose that is effective in treating your condition. Shake the oral suspension (liquid) before you measure a dose. Use the dosing syringe provided, or use a medicine dose-measuring device (not a kitchen spoon). Always follow directions on the medicine label about giving this medicine to a child. Naproxen doses are based on weight in children. Your child's dose needs may change if the child gains or loses weight. If you use naproxen long-term, you may need frequent medical tests. This medicine can affect the results of certain medical tests. Tell any doctor who treats you that you are using naproxen. Store at room temperature away from moisture, heat, and light. Keep the bottle tightly closed when not in use. What happens if I miss a dose? Since naproxen is used when needed, you may not be on a dosing schedule. Skip any missed dose if it's almost time for your next dose. Do not use two doses at one time. What happens if I overdose? Seek emergency medical attention or call the Poison Help line at 1-341.853.6872. What should I avoid while taking naproxen? Avoid drinking alcohol. It may increase your risk of stomach bleeding.   Avoid taking aspirin unless your doctor tells you to. Ask a doctor or pharmacist before using other medicines for pain, fever, swelling, or cold/flu symptoms. They may contain ingredients similar to naproxen (such as aspirin, ibuprofen, or ketoprofen). Ask your doctor before using an antacid, and use only the type your doctor recommends. Some antacids can make it harder for your body to absorb naproxen. What are the possible side effects of naproxen? Get emergency medical help if you have signs of an allergic reaction (runny or stuffy nose, wheezing or trouble breathing, hives, swelling in your face or throat) or a severe skin reaction (fever, sore throat, burning eyes, skin pain, red or purple skin rash with blistering and peeling). Get emergency medical help if you have signs of a heart attack or stroke: chest pain spreading to your jaw or shoulder, sudden numbness or weakness on one side of the body, slurred speech, leg swelling, feeling short of breath. Stop using naproxen and call your doctor at once if you have:  · shortness of breath (even with mild exertion); · swelling or rapid weight gain;  · the first sign of any skin rash or blister, no matter how mild;  · signs of stomach bleeding --bloody or tarry stools, coughing up blood or vomit that looks like coffee grounds;  · liver problems --nausea, upper stomach pain, loss of appetite, dark urine, nona-colored stools, jaundice (yellowing of the skin or eyes);  · kidney problems --little or no urination, painful urination, swelling in your feet or ankles; or  · low red blood cells (anemia) --pale skin, unusual tiredness, feeling light-headed or short of breath, cold hands and feet. Common side effects may include:  · headache;  · indigestion, heartburn, stomach pain; or  · flu symptoms; This is not a complete list of side effects and others may occur. Call your doctor for medical advice about side effects. You may report side effects to FDA at 2-603-FDA-8944.   What other drugs will affect naproxen? Ask your doctor before using naproxen if you take an antidepressant. Taking certain antidepressants with an NSAID may cause you to bruise or bleed easily. Ask a doctor or pharmacist before using naproxen with any other medications, especially:  · other NSAIDs or salicylates (diflunisal, salsalate);  · antacids and sucralfate;  · cholestyramine;  · cyclosporine;  · digoxin;  · lithium;  · methotrexate;  · pemetrexed;  · probenecid;  · warfarin (Coumadin, Glendy Klamath) or similar blood thinners;  · a diuretic or \"water pill\"; or  · heart or blood pressure medication. This list is not complete. Other drugs may affect naproxen, including prescription and over-the-counter medicines, vitamins, and herbal products. Not all possible drug interactions are listed here. Where can I get more information? Your pharmacist can provide more information about naproxen. Remember, keep this and all other medicines out of the reach of children, never share your medicines with others, and use this medication only for the indication prescribed. Every effort has been made to ensure that the information provided by [address] is accurate, up-to-date, and complete, but no guarantee is made to that effect. Drug information contained herein may be time sensitive. [facility] information has been compiled for use by healthcare practitioners and consumers in the [place] and therefore [employer] does not warrant that uses outside of the [place] are appropriate, unless specifically indicated otherwise. [facility]'s drug information does not endorse drugs, diagnose patients or recommend therapy. [facility] drug information is an informational resource designed to assist licensed healthcare practitioners in caring for their patients and/or to serve consumers viewing this service as a supplement to, and not a substitute for, the expertise, skill, knowledge and judgment of healthcare practitioners.  The absence of a warning for a given drug or drug combination in no way should be construed to indicate that the drug or drug combination is safe, effective or appropriate for any given patient. [facility] does not assume any responsibility for any aspect of healthcare administered with the aid of information [facility] provides. The information contained herein is not intended to cover all possible uses, directions, precautions, warnings, drug interactions, allergic reactions, or adverse effects. If you have questions about the drugs you are taking, check with your doctor, nurse or pharmacist.  Copyright 3178-7039 [facility] [place]. Version: 16.02. Revision date: 1/24/2020. Care instructions adapted under license by [facility] ([facility]). If you have questions about a medical condition or this instruction, always ask your healthcare professional. [address] any warranty or liability for your use of this information.

## 2020-11-13 ENCOUNTER — OFFICE VISIT (OUTPATIENT)
Dept: INTERNAL MEDICINE CLINIC | Age: 25
End: 2020-11-13
Payer: MEDICAID

## 2020-11-13 VITALS
DIASTOLIC BLOOD PRESSURE: 84 MMHG | TEMPERATURE: 96.6 F | WEIGHT: 242 LBS | BODY MASS INDEX: 41.54 KG/M2 | HEART RATE: 76 BPM | OXYGEN SATURATION: 98 % | SYSTOLIC BLOOD PRESSURE: 124 MMHG

## 2020-11-13 PROCEDURE — G8484 FLU IMMUNIZE NO ADMIN: HCPCS | Performed by: NURSE PRACTITIONER

## 2020-11-13 PROCEDURE — G8427 DOCREV CUR MEDS BY ELIG CLIN: HCPCS | Performed by: NURSE PRACTITIONER

## 2020-11-13 PROCEDURE — 99213 OFFICE O/P EST LOW 20 MIN: CPT | Performed by: NURSE PRACTITIONER

## 2020-11-13 PROCEDURE — 1036F TOBACCO NON-USER: CPT | Performed by: NURSE PRACTITIONER

## 2020-11-13 PROCEDURE — G8417 CALC BMI ABV UP PARAM F/U: HCPCS | Performed by: NURSE PRACTITIONER

## 2020-11-13 RX ORDER — SERTRALINE HYDROCHLORIDE 100 MG/1
150 TABLET, FILM COATED ORAL DAILY
Qty: 45 TABLET | Refills: 1 | Status: SHIPPED | OUTPATIENT
Start: 2020-11-13 | End: 2020-12-09 | Stop reason: SDUPTHER

## 2020-11-13 ASSESSMENT — ENCOUNTER SYMPTOMS
SHORTNESS OF BREATH: 0
COUGH: 0
WHEEZING: 0

## 2020-11-13 ASSESSMENT — PATIENT HEALTH QUESTIONNAIRE - PHQ9
4. FEELING TIRED OR HAVING LITTLE ENERGY: 3
5. POOR APPETITE OR OVEREATING: 1
1. LITTLE INTEREST OR PLEASURE IN DOING THINGS: 1
7. TROUBLE CONCENTRATING ON THINGS, SUCH AS READING THE NEWSPAPER OR WATCHING TELEVISION: 1
10. IF YOU CHECKED OFF ANY PROBLEMS, HOW DIFFICULT HAVE THESE PROBLEMS MADE IT FOR YOU TO DO YOUR WORK, TAKE CARE OF THINGS AT HOME, OR GET ALONG WITH OTHER PEOPLE: 1
SUM OF ALL RESPONSES TO PHQ9 QUESTIONS 1 & 2: 3
6. FEELING BAD ABOUT YOURSELF - OR THAT YOU ARE A FAILURE OR HAVE LET YOURSELF OR YOUR FAMILY DOWN: 3
2. FEELING DOWN, DEPRESSED OR HOPELESS: 2
3. TROUBLE FALLING OR STAYING ASLEEP: 2
9. THOUGHTS THAT YOU WOULD BE BETTER OFF DEAD, OR OF HURTING YOURSELF: 0
SUM OF ALL RESPONSES TO PHQ QUESTIONS 1-9: 13

## 2020-11-13 NOTE — LETTER
[facility] Internal Medicine  [address] [id number]  Phone: [phone]  Fax: [phone], APRN - CNP        November 13, 2020     Patient: Yovani Mancera   YOB: 1995   Date of Visit: 11/13/2020       To Whom it May Concern:    Yovani Mancera was seen in my clinic on 11/13/2020. I recommend limited heavy lifting until further evaluation by the specialist.     If you have any questions or concerns, please don't hesitate to call.     Sincerely,         Gerson Arauz, APRN - CNP

## 2020-11-13 NOTE — PROGRESS NOTES
Office Visit   11/13/2020    Subjective:  Chief Complaint   Patient presents with    Wrist Pain     left wrist     HPI:   Sue Barraza is a 26 y.o. female who presents to the clinic today for follow up. Last visit, pt reports that she was carrying a heavy basket and started with bilateral wrist pains immediately after twisting the basket. She states that her right wrist healed within a day. Her left wrist continues to have pain. Certain movements make this worse. No redness/swelling/heat. No fever/chills. Symptomatic management recommended. Xray completed. Today, pain is still present. Hit it against a wall by accident since last visit- pain still present more frequently. Depression- Taking Zoloft 100 mg PO daily. Denies side effects. She states that she thinks this is helping. Anxiety and depression still present daily. COVID-19 is causing stress. Denies SI/HI. Evonne Riojas psychology.      Review of Systems   Constitutional: Negative for chills, fatigue, fever and unexpected weight change. Eyes: Negative for visual disturbance. Respiratory: Negative for cough, shortness of breath and wheezing. Cardiovascular: Negative for chest pain, palpitations and leg swelling. Musculoskeletal:        Left wrist pain   Neurological: Negative for dizziness, weakness, light-headedness, numbness and headaches. Allergies   Allergen Reactions    Ceftin [Cefuroxime] Rash     Current Outpatient Rx   Medication Sig Dispense Refill    sertraline (ZOLOFT) 100 MG tablet Take 1.5 tablets by mouth daily 45 tablet 1    naproxen (NAPROSYN) 500 MG tablet Take 1 tablet by mouth 2 times daily (with meals) As needed 60 tablet 0    clobetasol (TEMOVATE) 0.05 % cream Apply thin layer once or twice daily to eczema prone skin. Try to break for a full 1 week after 3 weeks on the same area.  60 g 0    cetirizine (ZYRTEC) 10 MG tablet Take 10 mg by mouth daily       Patient Active Problem List   Diagnosis    Allergic rhinitis due to allergen    Anxiety and depression    Moderate episode of recurrent major depressive disorder ([facility] [address]    BRENTON (generalized anxiety disorder)    Morbidly obese (HCC)     Wt Readings from Last 3 Encounters:   11/13/20 242 lb (109.8 kg)   10/30/20 241 lb (109.3 kg)   08/12/20 241 lb (109.3 kg)     BP Readings from Last 3 Encounters:   11/13/20 124/84   10/30/20 120/66   08/12/20 124/80     PHQ-9 Total Score: 13 (11/13/2020  9:22 AM)  Thoughts that you would be better off dead, or of hurting yourself in some way: 0 (11/13/2020  9:22 AM)    Objective/Physical Exam:  /84   Pulse 76   Temp 96.6 °F (35.9 °C) (Temporal)   Wt 242 lb (109.8 kg)   SpO2 98%   BMI 41.54 kg/m²   Body mass index is 41.54 kg/m². Physical Exam  Vitals signs reviewed. Constitutional:       General: She is not in acute distress. Appearance: She is well-developed. She is not diaphoretic. HENT:      Head: Normocephalic and atraumatic. Cardiovascular:      Rate and Rhythm: Normal rate and regular rhythm. Pulmonary:      Effort: Pulmonary effort is normal. No respiratory distress. Breath sounds: Normal breath sounds. No wheezing or rales. Chest:      Chest wall: No tenderness. Musculoskeletal:      Comments: Left wrist in brace   Skin:     General: Skin is warm and dry. Neurological:      Mental Status: She is alert and oriented to person, place, and time. Coordination: Coordination normal.       Assessment and Plan:  Chantel Jones was seen today for wrist pain. Diagnoses and all orders for this visit:    Injury of left wrist, initial encounter/Left wrist pain  -     Xray results reviewed with the pt. She has been continuing symptomatic management and symptoms are not improving. In brace today. No redness/swelling/heat. Pulses equal bilaterally. Color normal.  - X-ray was completed and naproxen was recommended. X-ray was reviewed with hand surgeon, Dr. Willa Zuluaga who recommended symptomatic management for 1 to 2 weeks. If symptoms were worsening or fail to improve, recommend hand surgeon referral.  Will refer to Dr. Betito Mendieta today. - Kim Goff MD, Hand Surgery (Hand, Wrist, Upper Extremity), [facility]    Anxiety and depression  -    Mood not as well controlled d/t life stressors. Denies SI/HI. Anxiety and depression still present.  - Continue with psychology. - sertraline (ZOLOFT) 100 MG tablet; Take 1.5 tablets by mouth daily- increased dose    Work note provided. Return in about 4 weeks (around 12/11/2020) for mood f/u, or sooner if needed. Pt will call if symptoms worsen or fail to improve. All questions answered. Pt states no further questions or concerns at this time.    Electronically signed by: Gunjan Summers 11/13/20

## 2020-11-19 ENCOUNTER — VIRTUAL VISIT (OUTPATIENT)
Dept: PSYCHOLOGY | Age: 25
End: 2020-11-19
Payer: MEDICAID

## 2020-11-19 PROCEDURE — 90832 PSYTX W PT 30 MINUTES: CPT | Performed by: PSYCHOLOGIST

## 2020-11-19 NOTE — PROGRESS NOTES
TELEHEALTH VISIT -- Audio/Visual (During LYMQC-61 public health emergency)  }  Pursuant to the emergency declaration under the [address] [place] [address], [facility] waiver authority and the [employer] Act, this Virtual Visit was conducted, with patient's consent, to reduce the patient's risk of exposure to COVID-19 and provide continuity of care for an established patient. Services were provided through a video synchronous discussion virtually to substitute for in-person clinic visit. Pt gave verbal informed consent to participate in telehealth services. Conducted a risk-benefit analysis and determined that the patient's presenting problems are consistent with the use of telepsychology. Determined that the patient has sufficient knowledge and skills in the use of technology enabling them to adequately benefit from telepsychology. It was determined that this patient was able to be properly treated without an in-person session. Patient verified that they were currently located at the [facility] address that was provided during registration or another [facility] address, if noted below. Verified the following information:  Patient's identification: Yes  Patient location: [place] [id number]   Patient's call back number: [phone]   Patient's emergency contact's name and number, as well as permission to contact them if needed: Extended Emergency Contact Information  Primary Emergency Contact: Sheryle Rua  Address: [address] [place] [address] Phone: [phone]  Relation: Parent     Provider location: [facility]  Amalia Sosa, Ph.D.  Psychologist  11/19/2020  10:31 AM      Time spent with Patient: 28 minutes  This is patient's 17th  [facility] appointment.     Reason for Consult:    Chief Complaint   Patient presents with   Kyara Ping Depression Feedback given to PCP. S:  Pt seen for f/u of depression and anxiety. Pt reported \"alright\" mood and sxs. Potentially fractured left wrist, in significant pain and functionally limited. 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 Notices possible slight improvement in fatigue, potentially coincides w increased sertraline. Encouraged her to mention fatigue to PCP [facility] if not resolved by next appt. Focused on work goals. O:  MSE:    Appearance    alert, cooperative  Appetite normal  Sleep disturbance No  Fatigue No  Loss of pleasure No  Impulsive behavior No  Speech    spontaneous, normal rate, normal volume and well articulated  Mood    Anxious  Depressed  Affect    normal affect  Thought Content    intact and cognitive distortions  Thought Process    linear, goal directed and coherent  Associations    logical connections  Insight    Fair  Judgment    Intact  Orientation    oriented to person, place, time, and general circumstances  Memory    recent and remote memory intact  Attention/Concentration    intact  Morbid ideation No  Suicide Assessment    no suicidal ideation    History:  Social History:   Social History     Socioeconomic History    Marital status: Single     Spouse name: Not on file    Number of children: Not on file    Years of education: Not on file    Highest education level: Not on file   Occupational History    Occupation:  grad in fine arts     Comment: [employer] [address]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    Social Needs    Financial resource strain: Not hard at all   Hetal-Donell insecurity     Worry: Never true     Inability: Never true   [employer] needs     Medical: No     Non-medical: No   Tobacco Use    Smoking status: Never Smoker    Smokeless tobacco: Never Used   Substance and Sexual Activity    Alcohol use: Yes     Comment: 1 mikes hard lemonade in 1 month    Drug use: Never    Sexual activity: Not Currently   Lifestyle    Physical activity     Days per week: Not on file     Minutes per session: Not on file    Stress: Not on file   Relationships    Social connections     Talks on phone: Not on file     Gets together: Not on file     Attends Judaism service: Not on file     Active member of club or organization: Not on file     Attends meetings of clubs or organizations: Not on file     Relationship status: Not on file    Intimate partner violence     Fear of current or ex partner: Not on file     Emotionally abused: Not on file     Physically abused: Not on file     Forced sexual activity: Not on file   Other Topics Concern    Not on file   Social History Narrative    Pt in  [employer] program. 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 For fun, her life is consumed with homework. TOBACCO:   reports that she has never smoked. She has never used smokeless tobacco.  ETOH:   reports current alcohol use. Diagnosis:  1. Moderate episode of recurrent major depressive disorder ([facility] [address]    2. BRENTON (generalized anxiety disorder)        Plan:  Pt interventions:  Discussed and problem-solved barriers in adhering to behavioral change plan, Supportive techniques and Identified maladaptive thoughts      Documentation was done using voice recognition dragon software. Every effort was made to ensure accuracy; however, inadvertent, unintentional computerized transcription errors may be present.

## 2020-12-08 ASSESSMENT — ENCOUNTER SYMPTOMS
ABDOMINAL PAIN: 0
DIARRHEA: 0
COUGH: 0
NAUSEA: 0
VOMITING: 0
CONSTIPATION: 0
WHEEZING: 0
SHORTNESS OF BREATH: 0

## 2020-12-08 NOTE — PROGRESS NOTES
Office Visit   12/9/2020    Subjective:  Chief Complaint   Patient presents with    Depression     pt feels things are about the same      HPI:   Pancho Marshall is a 26 y.o. female who presents to the clinic today for follow up. Depression- Taking Zoloft 150 mg PO daily. Pt reports \"I think I am doing a little bit better. \" Her roommate got exposed to [employer] and so she is living with her parents right now-this is difficult. She states that she needs to get a job and she will feel better. Denies side effects. COVID-19 is causing stress. Denies SI/HI.  Seeing psychology, which helps. Referred to Dr. Shauna Severs for hand pain. Pt reports she has the same symptoms. She is still wearing a brace. States that symptoms are still present with certain movements. Pt reports that she could not get scheduled with specialist for a month. Review of Systems   Constitutional: Negative for chills, fatigue, fever and unexpected weight change. Eyes: Negative for visual disturbance. Respiratory: Negative for cough, shortness of breath and wheezing. Cardiovascular: Negative for chest pain, palpitations and leg swelling. Gastrointestinal: Negative for abdominal pain, constipation, diarrhea, nausea and vomiting. Musculoskeletal:        Left wrist pains   Skin: Negative for pallor and rash. Neurological: Negative for dizziness, weakness, light-headedness, numbness and headaches. Psychiatric/Behavioral: Positive for dysphoric mood. Negative for self-injury, sleep disturbance and suicidal ideas. The patient is nervous/anxious.       Allergies   Allergen Reactions    Ceftin [Cefuroxime] Rash     Current Outpatient Rx   Medication Sig Dispense Refill    sertraline (ZOLOFT) 100 MG tablet Take 1.5 tablets by mouth daily 135 tablet 0    naproxen (NAPROSYN) 500 MG tablet Take 1 tablet by mouth 2 times daily (with meals) As needed 60 tablet 0    clobetasol (TEMOVATE) 0.05 % cream Apply thin layer once or twice daily to eczema Plan:  Юлия was seen today for depression. Diagnoses and all orders for this visit:    Anxiety and depression/Positive depression screening  -    Mood controlled per pt. Seeing psychology. Would like to continue on the current regimen  - Continue with psychology and with the current medication dose. - sertraline (ZOLOFT) 100 MG tablet; Take 1.5 tablets by mouth daily- refilled today  -     Positive Screen for Clinical Depression with a Documented Follow-up Plan     Injury of left wrist, initial encounter   - Unaware pt was not able to be seen for a month. Still wearing a brace. - Seeing hand surgeon today    Return in about 6 weeks (around 1/20/2021) for mood f/u, or sooner if needed. Pt will call if symptoms worsen or fail to improve. All questions answered. Pt states no further questions or concerns at this time.    Electronically signed by: Amber Toney 12/09/20

## 2020-12-09 ENCOUNTER — OFFICE VISIT (OUTPATIENT)
Dept: INTERNAL MEDICINE CLINIC | Age: 25
End: 2020-12-09
Payer: MEDICAID

## 2020-12-09 ENCOUNTER — OFFICE VISIT (OUTPATIENT)
Dept: ORTHOPEDIC SURGERY | Age: 25
End: 2020-12-09
Payer: MEDICAID

## 2020-12-09 VITALS — TEMPERATURE: 97 F | WEIGHT: 246 LBS | BODY MASS INDEX: 42 KG/M2 | HEIGHT: 64 IN

## 2020-12-09 VITALS
BODY MASS INDEX: 42 KG/M2 | HEIGHT: 64 IN | WEIGHT: 246 LBS | HEART RATE: 76 BPM | TEMPERATURE: 96.5 F | DIASTOLIC BLOOD PRESSURE: 74 MMHG | SYSTOLIC BLOOD PRESSURE: 116 MMHG

## 2020-12-09 PROBLEM — M65.4 RADIAL STYLOID TENOSYNOVITIS: Status: ACTIVE | Noted: 2020-12-09

## 2020-12-09 PROCEDURE — G8484 FLU IMMUNIZE NO ADMIN: HCPCS | Performed by: ORTHOPAEDIC SURGERY

## 2020-12-09 PROCEDURE — G8427 DOCREV CUR MEDS BY ELIG CLIN: HCPCS | Performed by: NURSE PRACTITIONER

## 2020-12-09 PROCEDURE — 1036F TOBACCO NON-USER: CPT | Performed by: NURSE PRACTITIONER

## 2020-12-09 PROCEDURE — G8427 DOCREV CUR MEDS BY ELIG CLIN: HCPCS | Performed by: ORTHOPAEDIC SURGERY

## 2020-12-09 PROCEDURE — G8484 FLU IMMUNIZE NO ADMIN: HCPCS | Performed by: NURSE PRACTITIONER

## 2020-12-09 PROCEDURE — G8417 CALC BMI ABV UP PARAM F/U: HCPCS | Performed by: NURSE PRACTITIONER

## 2020-12-09 PROCEDURE — G8431 POS CLIN DEPRES SCRN F/U DOC: HCPCS | Performed by: NURSE PRACTITIONER

## 2020-12-09 PROCEDURE — 99213 OFFICE O/P EST LOW 20 MIN: CPT | Performed by: NURSE PRACTITIONER

## 2020-12-09 PROCEDURE — G8417 CALC BMI ABV UP PARAM F/U: HCPCS | Performed by: ORTHOPAEDIC SURGERY

## 2020-12-09 PROCEDURE — L3908 WHO COCK-UP NONMOLDE PRE OTS: HCPCS | Performed by: ORTHOPAEDIC SURGERY

## 2020-12-09 PROCEDURE — 99203 OFFICE O/P NEW LOW 30 MIN: CPT | Performed by: ORTHOPAEDIC SURGERY

## 2020-12-09 RX ORDER — SERTRALINE HYDROCHLORIDE 100 MG/1
150 TABLET, FILM COATED ORAL DAILY
Qty: 135 TABLET | Refills: 0 | Status: SHIPPED | OUTPATIENT
Start: 2020-12-09 | End: 2021-01-20 | Stop reason: SDUPTHER

## 2020-12-09 ASSESSMENT — PATIENT HEALTH QUESTIONNAIRE - PHQ9
6. FEELING BAD ABOUT YOURSELF - OR THAT YOU ARE A FAILURE OR HAVE LET YOURSELF OR YOUR FAMILY DOWN: 2
5. POOR APPETITE OR OVEREATING: 1
8. MOVING OR SPEAKING SO SLOWLY THAT OTHER PEOPLE COULD HAVE NOTICED. OR THE OPPOSITE, BEING SO FIGETY OR RESTLESS THAT YOU HAVE BEEN MOVING AROUND A LOT MORE THAN USUAL: 0
SUM OF ALL RESPONSES TO PHQ QUESTIONS 1-9: 11
9. THOUGHTS THAT YOU WOULD BE BETTER OFF DEAD, OR OF HURTING YOURSELF: 0
7. TROUBLE CONCENTRATING ON THINGS, SUCH AS READING THE NEWSPAPER OR WATCHING TELEVISION: 1
1. LITTLE INTEREST OR PLEASURE IN DOING THINGS: 1
SUM OF ALL RESPONSES TO PHQ QUESTIONS 1-9: 11
SUM OF ALL RESPONSES TO PHQ9 QUESTIONS 1 & 2: 2
SUM OF ALL RESPONSES TO PHQ QUESTIONS 1-9: 11
3. TROUBLE FALLING OR STAYING ASLEEP: 2
4. FEELING TIRED OR HAVING LITTLE ENERGY: 3
2. FEELING DOWN, DEPRESSED OR HOPELESS: 1

## 2020-12-09 NOTE — Clinical Note
Dear  John Muhammad, APRN - CNP,    Thank you very much for your referral or Ms. Tracey Pascal to me for evaluation and treatment of her Hand & Wrist condition. I appreciate your confidence in me and thank you for allowing me the opportunity to care for your patients. If I can be of any further assistance to you on this or any other patient, please do not hesitate to contact me. Sincerely,    Viridiana Dubon.  Erendira Canales MD

## 2020-12-09 NOTE — PROGRESS NOTES
Ms. Gilda Pantoja is a 26 y.o. right handed woman  who is seen today in Hand Surgical Consultation at the request of MARS Kauffman CNP. She presents today regarding right wrist symptoms which have been present for approximately 2 months. A history of antecedent trauma or injury is Present with having pain after lifting and dumping out an oil basket while at work. .  She reports symptoms to include mild pain along the  left  Dorsal wrist and distal forearm. Wrist symptoms are worse with certain twisting or gripping activities. Symptoms are Daily worse with use. She reports mild pain with thumb extension or pinch. Symptoms show no change over time. Previous treatment has included conservative measures and splinting of the left wrist.  She does not claim relation of her symptoms to her required work activities. She has not undergone any form of testing. I have today reviewed with Gilda Pantoja the clinically relevant, past medical history, medications, allergies,  family history, social history, and Review Of Systems & I have documented any details relevant to today's presenting complaints in my history above. Ms. Stefani Saucedo self-reported past medical history, medications, allergies,  family history, social history, and Review Of Systems have been scanned into the chart under the \"Media\" tab. Physical Exam:  Ms. Stefani Saucedo most recent vitals:  Vitals  Temp: 97 °F (36.1 °C)  Temp Source: Infrared  Height: 5' 4\" (162.6 cm)  Weight: 246 lb (111.6 kg)    She is well nourished, oriented to person, place & time. She demonstrates appropriate mood and affect as well as normal gait and station. Skin: Normal in appearance, Normal Color and Free of Lesions Bilaterally   Digital range of motion is without significant limitation bilaterally. Pain accompanies the flexion and extension of the left Thumb. There is not triggering associated with active thumb motion.   There is a palpable mass overlying the site of maximal tenderness left radial wrist.  Wrist range of motion is limited by pain and is accompanied by mild pain in dorsiflexion greater than palmar flexion. There is no evidence of gross joint instability bilaterally. Sensation is normal in the Median, Ulnar and Radial Sensory distribution bilaterally  Vascular examination reveals normal, good capillary refill and good color bilaterally   Swelling is mild along the radial margin of the wrist on the Left, normal on the Right  Muscular strength is clinically appropriate bilaterally. Examination of the left first dorsal extensor compartment of the wrist demonstrates little thickening and fullness. There is mild tenderness to palpation over the extensor tendons. There is mild pain with resisted thumb extension, and Finklestein's maneuver is positive left side. Wrist range of motion is accompanied by mild pain in dorsiflexion greater than palmar flexion. Exam of the radial carpal, intracarpal and midcarpal joints demonstrate no instability. Specifically the scapho-lunate joint demonstrates a negative scaphoid shift. The luno-triquetral joint demonstrates no instability with shear and shuck maneuvers. The piso-triquetral joint is nontender to compression and has no excess laxity. There is no mid carpal catch-up clunk and there is no subluxation nor instability of the distal radial ulnar joint. Radiographic Evaluation:  Radiographs are reviewed  today (3 views of the left wrist). They demonstrate no evidence of an acute fracture of the distal radius, ulna, or carpal bones (specifically the scaphoid appears normal). There is mild widening of the scapho-lunate interval &  moderate flexion of the scaphoidThe CMC joints & bases of the  Metacarpals do not show displacement or acute injury. There is no evidence of degenerative change. There is not evidence of other injury or bony fracture.         Impression:  Ms. Cormier Vania is showing clinical evidence of DeQuervain's Tendonitis and presents requesting further treatment. Plan:  I have had a thorough discussion with Ms. Genia Caro regarding the treatment options available for her initially presenting left  DeQuervain's Tenosynovitis, which is causing her significant symptoms and difficulty. I have outlined for Ms. Genia Caro the risk, benefits and consequences of the various treatment modalities, including a reasonable expectation for the long term success of each. We have discussed the likelihood that further, more aggressive treatment may be required for her current presenting condition. Based upon our current discussion and a reasonable understating of the options available to her, Ms. Genia Caro has selected to proceed with a conservative plan of treatment consisting of: the use of protective splints, activity modification, and the judicious use of over-the-counter anti-inflammatory medications if allowed by her primary care physician. An appropriately sized Removable forearm based Thumb-Spica orthosis was provided. Instructions were given regarding splint use and wear as well as suggestions for use of the other modalities were discussed. I have clearly explained to her that the above outlined treatment plan should not be expected to 'cure' her  DeQuervain's Tenosynovitis, but we are rather treating the symptoms with which she presents. She has understood that in order to achieve more durable relief of her symptoms and to prevent future worsening or further damage, that definitive surgical treatment would be required. Ms. Genia Caro  voiced an appropriate understanding of our discussion, the options available to her, and of the expectations of her selected  treatment. I have also discussed with Ms. Genia Caro  the other treatment options available to her  for this condition. We have today selected to proceed with conservative management.   She and I have agreed that if our current course of conservative treatment does not prove to be effective over the short term future, that she will schedule a follow-up appointment to discuss and select an alternate course of therapy including possibly injection or surgical treatment. Ms. Elzbieta Dumont has been given a full verbal list of instructions and precautions related to her present condition. I have asked her to followup with me in the office at the prescribed time. She is also specifically requested to call or return to the office sooner if her symptoms change or worsen prior to the next scheduled appointment.

## 2020-12-28 ENCOUNTER — VIRTUAL VISIT (OUTPATIENT)
Dept: PSYCHOLOGY | Age: 25
End: 2020-12-28
Payer: MEDICAID

## 2020-12-28 DIAGNOSIS — F33.1 MODERATE EPISODE OF RECURRENT MAJOR DEPRESSIVE DISORDER (HCC): Primary | ICD-10-CM

## 2020-12-28 DIAGNOSIS — F41.1 GAD (GENERALIZED ANXIETY DISORDER): ICD-10-CM

## 2020-12-28 PROCEDURE — 90832 PSYTX W PT 30 MINUTES: CPT | Performed by: PSYCHOLOGIST
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
## 2021-01-19 ASSESSMENT — ENCOUNTER SYMPTOMS
SHORTNESS OF BREATH: 0
COUGH: 0
WHEEZING: 0

## 2021-01-19 NOTE — PROGRESS NOTES
 BRENTON (generalized anxiety disorder)    Morbidly obese (HCC)    Radial styloid tenosynovitis      Wt Readings from Last 3 Encounters:   01/20/21 246 lb (111.6 kg)   12/09/20 246 lb (111.6 kg)   12/09/20 246 lb (111.6 kg)     BP Readings from Last 3 Encounters:   01/20/21 130/78   12/09/20 116/74   11/13/20 124/84     PHQ-9 Total Score: 7 (1/20/2021 10:02 AM)  Thoughts that you would be better off dead, or of hurting yourself in some way: 0 (1/20/2021 10:02 AM)    Objective/Physical Exam:  /78   Pulse 70   Temp 97.6 °F (36.4 °C) (Temporal)   Wt 246 lb (111.6 kg)   BMI 42.23 kg/m²   Body mass index is 42.23 kg/m². Physical Exam  Vitals signs reviewed. Constitutional:       General: She is not in acute distress. Appearance: She is well-developed. She is not diaphoretic. HENT:      Head: Normocephalic and atraumatic. Eyes:      Pupils: Pupils are equal, round, and reactive to light. Cardiovascular:      Rate and Rhythm: Normal rate and regular rhythm. Pulmonary:      Effort: Pulmonary effort is normal. No respiratory distress. Breath sounds: Normal breath sounds. No wheezing or rales. Chest:      Chest wall: No tenderness. Musculoskeletal:      Comments: Left wrist in brace   Skin:     General: Skin is warm and dry. Neurological:      Mental Status: She is alert and oriented to person, place, and time. Coordination: Coordination normal.   Psychiatric:         Mood and Affect: Mood normal.       Assessment and Plan:  Mckinley Alvarez was seen today for other. Diagnoses and all orders for this visit:    Anxiety and depression  -    Patient reports that she thinks her mood is improved compared to previously. However, she still has life stressors/dysphoric mood. She would like to increase dose of medication. Denies side effects on medication  - Options were reviewed. Patient agreeable to increasing Zoloft to 200 mg by mouth daily.   - Continue with psychology

## 2021-01-20 ENCOUNTER — OFFICE VISIT (OUTPATIENT)
Dept: INTERNAL MEDICINE CLINIC | Age: 26
End: 2021-01-20
Payer: MEDICAID

## 2021-01-20 VITALS
HEART RATE: 70 BPM | DIASTOLIC BLOOD PRESSURE: 78 MMHG | BODY MASS INDEX: 42.23 KG/M2 | SYSTOLIC BLOOD PRESSURE: 130 MMHG | TEMPERATURE: 97.6 F | WEIGHT: 246 LBS

## 2021-01-20 DIAGNOSIS — F41.9 ANXIETY AND DEPRESSION: Primary | ICD-10-CM

## 2021-01-20 DIAGNOSIS — F32.A ANXIETY AND DEPRESSION: Primary | ICD-10-CM

## 2021-01-20 DIAGNOSIS — S69.92XA INJURY OF LEFT WRIST, INITIAL ENCOUNTER: ICD-10-CM

## 2021-01-20 PROCEDURE — G8427 DOCREV CUR MEDS BY ELIG CLIN: HCPCS | Performed by: NURSE PRACTITIONER

## 2021-01-20 PROCEDURE — G8417 CALC BMI ABV UP PARAM F/U: HCPCS | Performed by: NURSE PRACTITIONER

## 2021-01-20 PROCEDURE — 1036F TOBACCO NON-USER: CPT | Performed by: NURSE PRACTITIONER

## 2021-01-20 PROCEDURE — 99213 OFFICE O/P EST LOW 20 MIN: CPT | Performed by: NURSE PRACTITIONER

## 2021-01-20 PROCEDURE — G8484 FLU IMMUNIZE NO ADMIN: HCPCS | Performed by: NURSE PRACTITIONER

## 2021-01-20 RX ORDER — SERTRALINE HYDROCHLORIDE 100 MG/1
200 TABLET, FILM COATED ORAL DAILY
Qty: 60 TABLET | Refills: 0 | Status: SHIPPED | OUTPATIENT
Start: 2021-01-20 | End: 2021-02-17 | Stop reason: SDUPTHER

## 2021-01-20 ASSESSMENT — PATIENT HEALTH QUESTIONNAIRE - PHQ9
6. FEELING BAD ABOUT YOURSELF - OR THAT YOU ARE A FAILURE OR HAVE LET YOURSELF OR YOUR FAMILY DOWN: 2
SUM OF ALL RESPONSES TO PHQ9 QUESTIONS 1 & 2: 2
1. LITTLE INTEREST OR PLEASURE IN DOING THINGS: 1
8. MOVING OR SPEAKING SO SLOWLY THAT OTHER PEOPLE COULD HAVE NOTICED. OR THE OPPOSITE, BEING SO FIGETY OR RESTLESS THAT YOU HAVE BEEN MOVING AROUND A LOT MORE THAN USUAL: 0
2. FEELING DOWN, DEPRESSED OR HOPELESS: 1
9. THOUGHTS THAT YOU WOULD BE BETTER OFF DEAD, OR OF HURTING YOURSELF: 0
3. TROUBLE FALLING OR STAYING ASLEEP: 0
10. IF YOU CHECKED OFF ANY PROBLEMS, HOW DIFFICULT HAVE THESE PROBLEMS MADE IT FOR YOU TO DO YOUR WORK, TAKE CARE OF THINGS AT HOME, OR GET ALONG WITH OTHER PEOPLE: 1

## 2021-01-25 ENCOUNTER — VIRTUAL VISIT (OUTPATIENT)
Dept: PSYCHOLOGY | Age: 26
End: 2021-01-25
Payer: MEDICAID

## 2021-01-25 DIAGNOSIS — F41.1 GAD (GENERALIZED ANXIETY DISORDER): ICD-10-CM

## 2021-01-25 DIAGNOSIS — F33.1 MODERATE EPISODE OF RECURRENT MAJOR DEPRESSIVE DISORDER (HCC): Primary | ICD-10-CM

## 2021-01-25 PROCEDURE — 90832 PSYTX W PT 30 MINUTES: CPT | Performed by: PSYCHOLOGIST

## 2021-01-25 NOTE — PROGRESS NOTES
TELEHEALTH VISIT -- Audio/Visual (During JOZNE-98 public health emergency)  }  Pursuant to the emergency declaration under the [facility], [facility] authority and the [employer] Act, this Virtual Visit was conducted, with patient's consent, to reduce the patient's risk of exposure to COVID-19 and provide continuity of care for an established patient. Services were provided through a video synchronous discussion virtually to substitute for in-person clinic visit. Pt gave verbal informed consent to participate in telehealth services. Conducted a risk-benefit analysis and determined that the patient's presenting problems are consistent with the use of telepsychology. Determined that the patient has sufficient knowledge and skills in the use of technology enabling them to adequately benefit from telepsychology. It was determined that this patient was able to be properly treated without an in-person session. Patient verified that they were currently located at the [facility] address that was provided during registration or another [facility] address, if noted below. Verified the following information:  Patient's identification: Yes  Patient location: [place] [id number]   Patient's call back number: [phone]   Patient's emergency contact's name and number, as well as permission to contact them if needed: Extended Emergency Contact Information  Primary Emergency Contact: [facility]  Address: [address] [place] [address] Phone: [phone]  Relation: Parent     Provider location: [place], [address] Consultation  Sam Gilliland, Ph.D.  Psychologist  1/25/2021  9:02 AM      Time spent with Patient: 28 minutes  This is patient's 18th  [facility] appointment. Reason for Consult:  depression    Feedback given to PCP.     S: Pt seen for f/u of depression and anxiety. Pt reported \"the same\" mood and sxs. Parents cannot be within 500 ft of e/o and cannot communicate directly w e/o, so pt has to be their communication medium. \"I wish I didn't have to raise my parents. \" Father has an apartment, but is frequently requesting pt communicate w her mom for him. Stated she will plan to have a fun activity w friends, but it is punctuated by being their go-between. Stated there is at least one exchanged every 5 hrs that pt feels obligated to convey. Discussed setting firm boundaries w both parents. Re mom: \"I love her and I don't want to hurt her, but she expects too much from me. \" wants pt to live w her again. Pt acknowledges her mother has never lived alone and does not handle isolation well. O:  MSE:  Appearance    alert, cooperative  Appetite normal  Sleep disturbance No  Fatigue No  Loss of pleasure No  Impulsive behavior No  Speech    spontaneous, normal rate, normal volume and well articulated  Mood    Anxious  Depressed  Affect    normal affect  Thought Content    intact and cognitive distortions  Thought Process    linear, goal directed and coherent  Associations    logical connections  Insight    Fair  Judgment    Intact  Orientation    oriented to person, place, time, and general circumstances  Memory    recent and remote memory intact  Attention/Concentration    intact  Morbid ideation No  Suicide Assessment    no suicidal ideation      History:  Social History:   Social History     Socioeconomic History    Marital status: Single     Spouse name: Not on file    Number of children: Not on file    Years of education: Not on file    Highest education level: Not on file   Occupational History    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    Social Needs    Financial resource strain: Not hard at all   Hetal-Donell insecurity Worry: Never true     Inability: Never true    Transportation needs     Medical: No     Non-medical: No   Tobacco Use    Smoking status: Never Smoker    Smokeless tobacco: Never Used   Substance and Sexual Activity    Alcohol use: Yes     Comment: 1 mikes hard lemonade in 1 month    Drug use: Never    Sexual activity: Not Currently   Lifestyle    Physical activity     Days per week: Not on file     Minutes per session: Not on file    Stress: Not on file   Relationships    Social connections     Talks on phone: Not on file     Gets together: Not on file     Attends [employer] service: Not on file     Active member of club or organization: Not on file     Attends meetings of clubs or organizations: Not on file     Relationship status: Not on file    Intimate partner violence     Fear of current or ex partner: Not on file     Emotionally abused: Not on file     Physically abused: Not on file     Forced sexual activity: Not on file   Other Topics Concern    Not on file   Social History Narrative    Pt in  [employer] program. 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 For fun, her life is consumed with homework. TOBACCO:   reports that she has never smoked. She has never used smokeless tobacco.  ETOH:   reports current alcohol use. Diagnosis:  1. Moderate episode of recurrent major depressive disorder ([facility] [address]    2. BRENTON (generalized anxiety disorder)          Plan:  Pt interventions:  Practiced assertive communication, Trained in improving communication skills and Discussed self-care (sleep, nutrition, rewarding activities, social support, exercise)        Documentation was done using voice recognition dragon software. Every effort was made to ensure accuracy; however, inadvertent, unintentional computerized transcription errors may be present.

## 2021-02-10 ENCOUNTER — TELEMEDICINE (OUTPATIENT)
Dept: PSYCHOLOGY | Age: 26
End: 2021-02-10
Payer: MEDICAID

## 2021-02-10 DIAGNOSIS — F33.1 MODERATE EPISODE OF RECURRENT MAJOR DEPRESSIVE DISORDER (HCC): Primary | ICD-10-CM

## 2021-02-10 DIAGNOSIS — F41.1 GAD (GENERALIZED ANXIETY DISORDER): ICD-10-CM

## 2021-02-10 PROCEDURE — 90832 PSYTX W PT 30 MINUTES: CPT | Performed by: PSYCHOLOGIST

## 2021-02-10 NOTE — PROGRESS NOTES
TELEHEALTH VISIT -- Audio/Visual (During SIYPQ-41 public health emergency)  }  Pursuant to the emergency declaration under the [address] [place] [address], [facility] authority and the [employer] Act, this Virtual Visit was conducted, with patient's consent, to reduce the patient's risk of exposure to COVID-19 and provide continuity of care for an established patient. Services were provided through a video synchronous discussion virtually to substitute for in-person clinic visit. Pt gave verbal informed consent to participate in telehealth services. Conducted a risk-benefit analysis and determined that the patient's presenting problems are consistent with the use of telepsychology. Determined that the patient has sufficient knowledge and skills in the use of technology enabling them to adequately benefit from telepsychology. It was determined that this patient was able to be properly treated without an in-person session. Patient verified that they were currently located at the [facility] address that was provided during registration or another [facility] address, if noted below. Verified the following information:  Patient's identification: Yes  Patient location: [place] [id number]   Patient's call back number: [phone]   Patient's emergency contact's name and number, as well as permission to contact them if needed: Extended Emergency Contact Information  Primary Emergency Contact: Linda [place]  Address: [address] [place] [address] Phone: [phone]  Relation: Parent     Provider location: [place], [address] Consultation  Kevin Moon, Ph.D.  Starr Us  2/10/2021  9:50 AM      Time spent with Patient: 16 minutes  This is patient's [facility]th [facility] appointment.     Reason for Consult:    Chief Complaint   Patient presents with   [facility]el[facility] Larry Depression Feedback given to PCP. S:  Pt seen for f/u of depression and anxiety. Pt arrived 19 mins late and was seen for abbreviated appt. Pt reported \"okay\" mood and sxs. Parents released from custodial, have to attend anger mgmnt classes and have court again in April. Their communication through pt has reduced slightly, still living separately. Mom has mentioned repeatedly that she would be able to keep the house if pt moved back in to help pay. Pt stays committed to avoiding this. Discussed possibility of seeking employment out of her field to afford her living expenses and leave . O:  MSE:    Appearance    alert, cooperative  Appetite normal  Sleep disturbance Yes  Fatigue Yes  Loss of pleasure No  Impulsive behavior No  Speech    spontaneous, normal rate, normal volume and well articulated  Mood    Depressed  Affect    depressed affect  Thought Content    intact and cognitive distortions  Thought Process    goal directed and coherent  Associations    logical connections  Insight    Fair  Judgment    Intact  Orientation    oriented to person, place, time, and general circumstances  Memory    recent and remote memory intact  Attention/Concentration    intact  Morbid ideation No  Suicide Assessment    no suicidal ideation    History:  Social History:   Social History     Socioeconomic History    Marital status: Single     Spouse name: Not on file    Number of children: Not on file    Years of education: Not on file    Highest education level: Not on file   Occupational History    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    Social Needs    Financial resource strain: Not hard at all   Hetal-Donell insecurity     Worry: Never true     Inability: Never true   [employer] needs     Medical: No     Non-medical: No   Tobacco Use    Smoking status: Never Smoker    Smokeless tobacco: Never Used Substance and Sexual Activity    Alcohol use: Yes     Comment: 1 mikes hard lemonade in 1 month    Drug use: Never    Sexual activity: Not Currently   Lifestyle    Physical activity     Days per week: Not on file     Minutes per session: Not on file    Stress: Not on file   Relationships    Social connections     Talks on phone: Not on file     Gets together: Not on file     Attends Yazidi service: Not on file     Active member of club or organization: Not on file     Attends meetings of clubs or organizations: Not on file     Relationship status: Not on file    Intimate partner violence     Fear of current or ex partner: Not on file     Emotionally abused: Not on file     Physically abused: Not on file     Forced sexual activity: Not on file   Other Topics Concern    Not on file   Social History Narrative    Pt in  [employer] program. 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 For fun, her life is consumed with homework. TOBACCO:   reports that she has never smoked. She has never used smokeless tobacco.  ETOH:   reports current alcohol use. Diagnosis:  1. Moderate episode of recurrent major depressive disorder ([facility] [address]    2. BRENTON (generalized anxiety disorder)          Plan:  Pt interventions:  Trained in strategies for increasing balanced thinking and Discussed self-care (sleep, nutrition, rewarding activities, social support, exercise)        Documentation was done using voice recognition dragon software. Every effort was made to ensure accuracy; however, inadvertent, unintentional computerized transcription errors may be present.

## 2021-02-12 NOTE — PROGRESS NOTES
Assessment and Plan:  Robert Carpenter was seen today for anxiety and depression. Diagnoses and all orders for this visit:    Anxiety and depression  -     Well controlled per pt. Denies side effects.  - Continue with psychology. - Continue current regimen  - sertraline (ZOLOFT) 100 MG tablet; Take 2 tablets by mouth daily- refilled today    Injury of left wrist, initial encounter   - Continue with hand surgeon. Seasonal allergic rhinitis due to other allergic trigger   - Well controlled on the current regimen    Class 3 severe obesity due to excess calories without serious comorbidity with body mass index (BMI) of 40.0 to 44.9 in adult [facility])   - Lifestyle modifications such as exercise, weight loss and healthy diet encouraged and reviewed with the pt. Snoring  -    Recommended referral to sleep medicine.   - Ambulatory referral to Sleep Medicine    Atopic dermatitis, unspecified type  -    Well controlled on the current as needed regimen  - Continue as needed use  - clobetasol (TEMOVATE) 0.05 % cream; Apply thin layer once or twice daily to eczema prone skin. Try to break for a full 1 week after 3 weeks on the same area. - refilled today    Return in about 3 months (around 5/17/2021) for mood/snoring/weight/wrist pains, or sooner if needed. Pt will call if symptoms worsen or fail to improve. All questions answered. Pt states no further questions or concerns at this time.    Electronically signed by: Kerrie Ford 02/17/21

## 2021-02-17 ENCOUNTER — OFFICE VISIT (OUTPATIENT)
Dept: INTERNAL MEDICINE CLINIC | Age: 26
End: 2021-02-17
Payer: MEDICAID

## 2021-02-17 VITALS
BODY MASS INDEX: 41.88 KG/M2 | OXYGEN SATURATION: 99 % | TEMPERATURE: 96.8 F | SYSTOLIC BLOOD PRESSURE: 138 MMHG | HEART RATE: 85 BPM | DIASTOLIC BLOOD PRESSURE: 82 MMHG | WEIGHT: 244 LBS

## 2021-02-17 DIAGNOSIS — F41.9 ANXIETY AND DEPRESSION: Primary | ICD-10-CM

## 2021-02-17 DIAGNOSIS — L20.9 ATOPIC DERMATITIS, UNSPECIFIED TYPE: ICD-10-CM

## 2021-02-17 DIAGNOSIS — E66.01 CLASS 3 SEVERE OBESITY DUE TO EXCESS CALORIES WITHOUT SERIOUS COMORBIDITY WITH BODY MASS INDEX (BMI) OF 40.0 TO 44.9 IN ADULT (HCC): ICD-10-CM

## 2021-02-17 DIAGNOSIS — R06.83 SNORING: ICD-10-CM

## 2021-02-17 DIAGNOSIS — S69.92XA INJURY OF LEFT WRIST, INITIAL ENCOUNTER: ICD-10-CM

## 2021-02-17 DIAGNOSIS — F32.A ANXIETY AND DEPRESSION: Primary | ICD-10-CM

## 2021-02-17 DIAGNOSIS — J30.89 SEASONAL ALLERGIC RHINITIS DUE TO OTHER ALLERGIC TRIGGER: ICD-10-CM

## 2021-02-17 PROCEDURE — G8427 DOCREV CUR MEDS BY ELIG CLIN: HCPCS | Performed by: NURSE PRACTITIONER

## 2021-02-17 PROCEDURE — G8484 FLU IMMUNIZE NO ADMIN: HCPCS | Performed by: NURSE PRACTITIONER

## 2021-02-17 PROCEDURE — 99214 OFFICE O/P EST MOD 30 MIN: CPT | Performed by: NURSE PRACTITIONER

## 2021-02-17 PROCEDURE — 1036F TOBACCO NON-USER: CPT | Performed by: NURSE PRACTITIONER

## 2021-02-17 PROCEDURE — G8417 CALC BMI ABV UP PARAM F/U: HCPCS | Performed by: NURSE PRACTITIONER

## 2021-02-17 RX ORDER — CLOBETASOL PROPIONATE 0.5 MG/G
CREAM TOPICAL
Qty: 60 G | Refills: 0 | Status: SHIPPED | OUTPATIENT
Start: 2021-02-17 | End: 2021-07-28 | Stop reason: SDUPTHER

## 2021-02-17 RX ORDER — SERTRALINE HYDROCHLORIDE 100 MG/1
200 TABLET, FILM COATED ORAL DAILY
Qty: 180 TABLET | Refills: 0 | Status: SHIPPED | OUTPATIENT
Start: 2021-02-17 | End: 2021-05-18 | Stop reason: SDUPTHER

## 2021-02-17 ASSESSMENT — ENCOUNTER SYMPTOMS
ABDOMINAL PAIN: 0
WHEEZING: 0
COUGH: 0
VOMITING: 0
SHORTNESS OF BREATH: 0
CONSTIPATION: 0
NAUSEA: 0
DIARRHEA: 0

## 2021-02-17 ASSESSMENT — PATIENT HEALTH QUESTIONNAIRE - PHQ9
SUM OF ALL RESPONSES TO PHQ QUESTIONS 1-9: 8
SUM OF ALL RESPONSES TO PHQ QUESTIONS 1-9: 8
9. THOUGHTS THAT YOU WOULD BE BETTER OFF DEAD, OR OF HURTING YOURSELF: 0
3. TROUBLE FALLING OR STAYING ASLEEP: 2
4. FEELING TIRED OR HAVING LITTLE ENERGY: 3

## 2021-03-04 ENCOUNTER — TELEMEDICINE (OUTPATIENT)
Dept: PSYCHOLOGY | Age: 26
End: 2021-03-04
Payer: MEDICAID

## 2021-03-04 DIAGNOSIS — F33.1 MODERATE EPISODE OF RECURRENT MAJOR DEPRESSIVE DISORDER (HCC): Primary | ICD-10-CM

## 2021-03-04 DIAGNOSIS — F41.1 GAD (GENERALIZED ANXIETY DISORDER): ICD-10-CM

## 2021-03-04 PROCEDURE — 90832 PSYTX W PT 30 MINUTES: CPT | Performed by: PSYCHOLOGIST

## 2021-03-04 NOTE — PROGRESS NOTES
TELEHEALTH VISIT -- Audio/Visual (During HATOB-81 public health emergency)  }  Pursuant to the emergency declaration under the [facility], [facility] waiver authority and the [employer] Act, this Virtual Visit was conducted, with patient's consent, to reduce the patient's risk of exposure to COVID-19 and provide continuity of care for an established patient. Services were provided through a video synchronous discussion virtually to substitute for in-person clinic visit. Pt gave verbal informed consent to participate in telehealth services. Conducted a risk-benefit analysis and determined that the patient's presenting problems are consistent with the use of telepsychology. Determined that the patient has sufficient knowledge and skills in the use of technology enabling them to adequately benefit from telepsychology. It was determined that this patient was able to be properly treated without an in-person session. Patient verified that they were currently located at the [facility] address that was provided during registration or another [facility] address, if noted below. Verified the following information:  Patient's identification: Yes  Patient location: [place] [id number]   Patient's call back number: [phone]   Patient's emergency contact's name and number, as well as permission to contact them if needed: Extended Emergency Contact Information  Primary Emergency Contact: [facility]  Address: [address] [place] [address] [address] Phone: [phone]  Relation: Parent     Provider location: [place] Consultation  Leann Montoya, Ph.D.  Psychologist  3/4/2021  1:45 PM      Time spent with Patient: 22 minutes  This is patient's 20th  [facility] appointment.     Reason for Consult:    Chief Complaint   Patient presents with   [facility] Depression Comment: 1 mikes hard lemonade in 1 month    Drug use: Never    Sexual activity: Not Currently   Lifestyle    Physical activity     Days per week: Not on file     Minutes per session: Not on file    Stress: Not on file   Relationships    Social connections     Talks on phone: Not on file     Gets together: Not on file     Attends Yazdanism service: Not on file     Active member of club or organization: Not on file     Attends meetings of clubs or organizations: Not on file     Relationship status: Not on file    Intimate partner violence     Fear of current or ex partner: Not on file     Emotionally abused: Not on file     Physically abused: Not on file     Forced sexual activity: Not on file   Other Topics Concern    Not on file   Social History Narrative    Pt in  [employer] program. 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 For fun, her life is consumed with homework. TOBACCO:   reports that she has never smoked. She has never used smokeless tobacco.  ETOH:   reports current alcohol use. Diagnosis:  1. Moderate episode of recurrent major depressive disorder ([facility] [address]    2. BRENTON (generalized anxiety disorder)          Plan:  Pt interventions:  Trained in strategies for increasing balanced thinking, Discussed and set plan for behavioral activation, Discussed and problem-solved barriers in adhering to behavioral change plan, Motivational Interviewing to increase patient confidence and compliance with adhering to behavioral change plan and Motivational Interviewing to determine importance and readiness for change        Documentation was done using voice recognition dragon software. Every effort was made to ensure accuracy; however, inadvertent, unintentional computerized transcription errors may be present.

## 2021-03-25 ENCOUNTER — VIRTUAL VISIT (OUTPATIENT)
Dept: PSYCHOLOGY | Age: 26
End: 2021-03-25
Payer: MEDICAID

## 2021-03-25 DIAGNOSIS — F41.1 GAD (GENERALIZED ANXIETY DISORDER): ICD-10-CM

## 2021-03-25 DIAGNOSIS — F33.1 MODERATE EPISODE OF RECURRENT MAJOR DEPRESSIVE DISORDER (HCC): Primary | ICD-10-CM

## 2021-03-25 PROCEDURE — 90832 PSYTX W PT 30 MINUTES: CPT | Performed by: PSYCHOLOGIST

## 2021-03-25 NOTE — PROGRESS NOTES
TELEHEALTH VISIT -- Audio/Visual (During JAGRI-68 public health emergency)  }  Pursuant to the emergency declaration under the [address] [place] [address], [facility] waiver authority and the [employer] Act, this Virtual Visit was conducted, with patient's consent, to reduce the patient's risk of exposure to COVID-19 and provide continuity of care for an established patient. Services were provided through a video synchronous discussion virtually to substitute for in-person clinic visit. Pt gave verbal informed consent to participate in telehealth services. Conducted a risk-benefit analysis and determined that the patient's presenting problems are consistent with the use of telepsychology. Determined that the patient has sufficient knowledge and skills in the use of technology enabling them to adequately benefit from telepsychology. It was determined that this patient was able to be properly treated without an in-person session. Patient verified that they were currently located at the [facility] address that was provided during registration or another [facility] address, if noted below. Verified the following information:  Patient's identification: Yes  Patient location: [place] [id number]   Patient's call back number: [phone]   Patient's emergency contact's name and number, as well as permission to contact them if needed: Extended Emergency Contact Information  Primary Emergency Contact: [facility]  Address: [address] [place] [address] [address] [place] [address] [address] Phone: [phone]  Relation: Parent     Provider location: [facility] Consultation  Paulette Jefferson, Ph.D.  Psychologist  3/25/2021  1:03 PM      Time spent with Patient: 25 minutes  This is patient's 21st  [facility] appointment.     Reason for Consult:    Chief Complaint   Patient presents with   [employer] Depression Feedback given to PCP. S:  Pt seen for f/u of depression and anxiety. Pt reported unchanged mood and sxs. Has 2 potential roommate options, but they won't decide for several more wks and pt will almost be at end of lease. Negative cognitions about \"how I function. \" Stated belief she \"should\" be more stable. Knows she would feel happier w stability and perhaps not feel so negative toward herself if she had a job out of . Importance of finding work: 6/10. Knows it is important to her, but feeling like she cannot take on more stress. Working long hrs, helping her friends and over-stressing for their performance. O:  MSE:    Appearance    alert, cooperative  Appetite normal  Sleep disturbance Yes  Fatigue Yes  Loss of pleasure No  Impulsive behavior No  Speech    spontaneous, normal rate, normal volume and well articulated  Mood    Depressed  Affect    depressed affect  Thought Content    intact and cognitive distortions  Thought Process    linear, goal directed and coherent  Associations    logical connections  Insight    Fair  Judgment    Intact  Orientation    oriented to person, place, time, and general circumstances  Memory    recent and remote memory intact  Attention/Concentration    intact  Morbid ideation No  Suicide Assessment    no suicidal ideation    History:  Social History:   Social History     Socioeconomic History    Marital status: Single     Spouse name: Not on file    Number of children: Not on file    Years of education: Not on file    Highest education level: Not on file   Occupational History    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    Social Needs    Financial resource strain: Not hard at all   [place] insecurity     Worry: Never true     Inability: Never true   [employer] needs     Medical: No     Non-medical: No   Tobacco Use    Smoking status: Never Smoker    Smokeless tobacco: Never Used   Substance and Sexual Activity    Alcohol use: Yes     Comment: 1 mikes hard lemonade in 1 month    Drug use: Never    Sexual activity: Not Currently   Lifestyle    Physical activity     Days per week: Not on file     Minutes per session: Not on file    Stress: Not on file   Relationships    Social connections     Talks on phone: Not on file     Gets together: Not on file     Attends Hoahaoism service: Not on file     Active member of club or organization: Not on file     Attends meetings of clubs or organizations: Not on file     Relationship status: Not on file    Intimate partner violence     Fear of current or ex partner: Not on file     Emotionally abused: Not on file     Physically abused: Not on file     Forced sexual activity: Not on file   Other Topics Concern    Not on file   Social History Narrative    Pt in  [employer] program. 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 For fun, her life is consumed with homework. TOBACCO:   reports that she has never smoked. She has never used smokeless tobacco.  ETOH:   reports current alcohol use. Diagnosis:  1. Moderate episode of recurrent major depressive disorder ([facility] [address]    2. BRENTON (generalized anxiety disorder)          Plan:  Pt interventions:  Discussed and problem-solved barriers in adhering to behavioral change plan, Motivational Interviewing to increase patient confidence and compliance with adhering to behavioral change plan and Motivational Interviewing to determine importance and readiness for change        Documentation was done using voice recognition dragon software. Every effort was made to ensure accuracy; however, inadvertent, unintentional computerized transcription errors may be present.

## 2021-05-03 ENCOUNTER — VIRTUAL VISIT (OUTPATIENT)
Dept: PSYCHOLOGY | Age: 26
End: 2021-05-03
Payer: MEDICAID

## 2021-05-03 DIAGNOSIS — F33.1 MODERATE EPISODE OF RECURRENT MAJOR DEPRESSIVE DISORDER (HCC): Primary | ICD-10-CM

## 2021-05-03 DIAGNOSIS — F41.1 GAD (GENERALIZED ANXIETY DISORDER): ICD-10-CM

## 2021-05-03 PROCEDURE — 90832 PSYTX W PT 30 MINUTES: CPT | Performed by: PSYCHOLOGIST

## 2021-05-03 NOTE — PROGRESS NOTES
TELEHEALTH VISIT -- Audio/Visual (During ZXPOH-75 public health emergency)  }  Pursuant to the emergency declaration under the [address] [place] [address], [facility] authority and the [employer] Act, this Virtual Visit was conducted, with patient's consent, to reduce the patient's risk of exposure to COVID-19 and provide continuity of care for an established patient. Services were provided through a video synchronous discussion virtually to substitute for in-person clinic visit. Pt gave verbal informed consent to participate in telehealth services. Conducted a risk-benefit analysis and determined that the patient's presenting problems are consistent with the use of telepsychology. Determined that the patient has sufficient knowledge and skills in the use of technology enabling them to adequately benefit from telepsychology. It was determined that this patient was able to be properly treated without an in-person session. Patient verified that they were currently located at the [facility] address that was provided during registration or another [facility] address, if noted below. Verified the following information:  Patient's identification: Yes  Patient location: [place] [id number]   Patient's call back number: [phone]   Patient's emergency contact's name and number, as well as permission to contact them if needed: Extended Emergency Contact Information  Primary Emergency Contact: Bozena Alcantara  Address: [address] [place] [address] Phone: [phone]  Relation: Parent     Provider location: [place] Consultation  Vincent Castro, Ph.D.  Psychologist  5/3/2021  10:09 AM      Time spent with Patient: 21 minutes  This is patient's 22nd  [facility] appointment.     Reason for Consult:    Chief Complaint   Patient presents with   [facility] Depression Feedback given to PCP. S:  Pt seen for f/u of depression and anxierty. Pt reported unchanged mood and sxs. Discussed housing situation, communication w parents. Reportedly perceives her friends are just puttng up w her, so she has to be pleasant and never shares her distress so as to not feel burdensome. O:  MSE:  Appearance    alert, cooperative  Appetite normal  Sleep disturbance Yes  Fatigue Yes  Loss of pleasure No  Impulsive behavior No  Speech    spontaneous, normal rate, normal volume and well articulated  Mood    Depressed  Affect    depressed affect  Thought Content    intact and cognitive distortions  Thought Process    linear, goal directed and coherent  Associations    logical connections  Insight    Fair  Judgment    Intact  Orientation    oriented to person, place, time, and general circumstances  Memory    recent and remote memory intact  Attention/Concentration    intact  Morbid ideation No  Suicide Assessment    no suicidal ideation  History:  Social History:   Social History     Socioeconomic History    Marital status: Single     Spouse name: Not on file    Number of children: Not on file    Years of education: Not on file    Highest education level: Not on file   Occupational History    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    Social Needs    Financial resource strain: Not hard at all   [employer] insecurity     Worry: Never true     Inability: Never true   HDF needs     Medical: No     Non-medical: No   Tobacco Use    Smoking status: Never Smoker    Smokeless tobacco: Never Used   Substance and Sexual Activity    Alcohol use: Yes     Comment: 1 mikes hard lemonade in 1 month    Drug use: Never    Sexual activity: Not Currently   Lifestyle    Physical activity     Days per week: Not on file     Minutes per session: Not on file    Stress: Not on file   Relationships    Social connections     Talks on phone: Not on file     Gets together: Not on file     Attends Judaism service: Not on file     Active member of club or organization: Not on file     Attends meetings of clubs or organizations: Not on file     Relationship status: Not on file    Intimate partner violence     Fear of current or ex partner: Not on file     Emotionally abused: Not on file     Physically abused: Not on file     Forced sexual activity: Not on file   Other Topics Concern    Not on file   Social History Narrative    Pt in  [employer] program. 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 For fun, her life is consumed with homework. TOBACCO:   reports that she has never smoked. She has never used smokeless tobacco.  ETOH:   reports current alcohol use. Diagnosis:  1. Moderate episode of recurrent major depressive disorder ([facility] [address]    2. BRENTON (generalized anxiety disorder)          Plan:  Pt interventions:  Trained in improving communication skills, Discussed and problem-solved barriers in adhering to behavioral change plan and Identified maladaptive thoughts        Documentation was done using voice recognition dragon software. Every effort was made to ensure accuracy; however, inadvertent, unintentional computerized transcription errors may be present.

## 2021-05-17 ENCOUNTER — VIRTUAL VISIT (OUTPATIENT)
Dept: PSYCHOLOGY | Age: 26
End: 2021-05-17
Payer: MEDICAID

## 2021-05-17 DIAGNOSIS — F41.1 GAD (GENERALIZED ANXIETY DISORDER): ICD-10-CM

## 2021-05-17 DIAGNOSIS — F33.1 MODERATE EPISODE OF RECURRENT MAJOR DEPRESSIVE DISORDER (HCC): Primary | ICD-10-CM

## 2021-05-17 PROCEDURE — 90832 PSYTX W PT 30 MINUTES: CPT | Performed by: PSYCHOLOGIST

## 2021-05-17 NOTE — PROGRESS NOTES
TELEHEALTH VISIT -- Audio/Visual (During LINFU-89 public health emergency)  }  Pursuant to the emergency declaration under the [address] [place] [address], [facility] waiver authority and the [employer] Act, this Virtual Visit was conducted, with patient's consent, to reduce the patient's risk of exposure to COVID-19 and provide continuity of care for an established patient. Services were provided through a video synchronous discussion virtually to substitute for in-person clinic visit. Pt gave verbal informed consent to participate in telehealth services. Conducted a risk-benefit analysis and determined that the patient's presenting problems are consistent with the use of telepsychology. Determined that the patient has sufficient knowledge and skills in the use of technology enabling them to adequately benefit from telepsychology. It was determined that this patient was able to be properly treated without an in-person session. Patient verified that they were currently located at the [facility] address that was provided during registration or another [facility] address, if noted below. Verified the following information:  Patient's identification: Yes  Patient location: [place] [id number]   Patient's call back number: [phone]   Patient's emergency contact's name and number, as well as permission to contact them if needed: Extended Emergency Contact Information  Primary Emergency Contact: Ken Post  Address: [address] [place] [address] Phone: [phone]  Relation: Parent     Provider location: Ashwin Vaughan, Ph.D.  Psychologist  5/17/2021  11:05 AM      Time spent with Patient: 30 minutes  This is patient's 23rd  [facility] appointment.     Reason for Consult:    Chief Complaint   Patient presents with   Orest Panfilo Depression Feedback given to PCP. S:  Pt seen for f/u of depression and anxiety. Pt reported stable mood and sxs. Moving in w [place] in early June, her cats will be coming w her, which angered mom. Work continues to be stressful and long hrs. Mom's OoP got denied. Discussed mom's highly critical style. Explored creating boundaries. \"I don't want it to be more distant, I just want it to be better. \" Discussed communication style w mom. Provided psychoeducation on soften [employer] and assertiveness. O:  MSE:    Appearance    alert, cooperative  Appetite normal  Sleep disturbance Yes  Fatigue Yes  Loss of pleasure Yes  Impulsive behavior No  Speech    spontaneous, normal rate, normal volume and well articulated  Mood    Depressed  Affect    depressed affect  Thought Content    intact and cognitive distortions  Thought Process    linear, goal directed and coherent  Associations    logical connections  Insight    Fair  Judgment    Intact  Orientation    oriented to person, place, time, and general circumstances  Memory    recent and remote memory intact  Attention/Concentration    intact  Morbid ideation No  Suicide Assessment    no suicidal ideation    History:  Social History:   Social History     Socioeconomic History    Marital status: Single     Spouse name: Not on file    Number of children: Not on file    Years of education: Not on file    Highest education level: Not on file   Occupational History    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    Tobacco Use    Smoking status: Never Smoker    Smokeless tobacco: Never Used   Vaping Use    Vaping Use: Never used   Substance and Sexual Activity    Alcohol use: Yes     Comment: 1 mikes hard lemonade in 1 month    Drug use: Never    Sexual activity: Not Currently   Other Topics Concern    Not on file   Social History Narrative    Pt in  [employer] program. 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 For fun, her life is consumed with homework. Social Determinants of Health     Financial Resource Strain:     Difficulty of Paying Living Expenses:    Food Insecurity:     Worried About Running Out of Food in the Last Year:     [address] in the Last Year:    Transportation Needs:     Lack of Transportation (Medical):  Lack of Transportation (Non-Medical):    Physical Activity:     Days of Exercise per Week:     Minutes of Exercise per Session:    Stress:     Feeling of Stress :    Social Connections:     Frequency of Communication with Friends and Family:     Frequency of Social Gatherings with Friends and Family:     Attends Hinduism Services:     Active Member of Clubs or Organizations:     Attends Club or Organization Meetings:     Marital Status:    Intimate Partner Violence:     Fear of Current or Ex-Partner:     Emotionally Abused:     Physically Abused:     Sexually Abused:      TOBACCO:   reports that she has never smoked. She has never used smokeless tobacco.  ETOH:   reports current alcohol use. Diagnosis:  1. Moderate episode of recurrent major depressive disorder ([facility] [address]    2. BRENTON (generalized anxiety disorder)          Plan:  Pt interventions:  Practiced assertive communication and Trained in improving communication skills        Documentation was done using voice recognition dragon software. Every effort was made to ensure accuracy; however, inadvertent, unintentional computerized transcription errors may be present.

## 2021-05-18 ENCOUNTER — OFFICE VISIT (OUTPATIENT)
Dept: INTERNAL MEDICINE CLINIC | Age: 26
End: 2021-05-18
Payer: MEDICAID

## 2021-05-18 VITALS
BODY MASS INDEX: 42.36 KG/M2 | WEIGHT: 246.8 LBS | DIASTOLIC BLOOD PRESSURE: 80 MMHG | HEART RATE: 72 BPM | SYSTOLIC BLOOD PRESSURE: 124 MMHG

## 2021-05-18 DIAGNOSIS — R06.83 SNORING: ICD-10-CM

## 2021-05-18 DIAGNOSIS — T30.0 BURN: ICD-10-CM

## 2021-05-18 DIAGNOSIS — J30.89 SEASONAL ALLERGIC RHINITIS DUE TO OTHER ALLERGIC TRIGGER: ICD-10-CM

## 2021-05-18 DIAGNOSIS — E66.01 CLASS 3 SEVERE OBESITY DUE TO EXCESS CALORIES WITHOUT SERIOUS COMORBIDITY WITH BODY MASS INDEX (BMI) OF 40.0 TO 44.9 IN ADULT (HCC): ICD-10-CM

## 2021-05-18 DIAGNOSIS — F41.9 ANXIETY AND DEPRESSION: Primary | ICD-10-CM

## 2021-05-18 DIAGNOSIS — Z13.31 POSITIVE DEPRESSION SCREENING: ICD-10-CM

## 2021-05-18 DIAGNOSIS — F32.A ANXIETY AND DEPRESSION: Primary | ICD-10-CM

## 2021-05-18 PROCEDURE — G8427 DOCREV CUR MEDS BY ELIG CLIN: HCPCS | Performed by: NURSE PRACTITIONER

## 2021-05-18 PROCEDURE — 1036F TOBACCO NON-USER: CPT | Performed by: NURSE PRACTITIONER

## 2021-05-18 PROCEDURE — 99213 OFFICE O/P EST LOW 20 MIN: CPT | Performed by: NURSE PRACTITIONER

## 2021-05-18 PROCEDURE — G8417 CALC BMI ABV UP PARAM F/U: HCPCS | Performed by: NURSE PRACTITIONER

## 2021-05-18 PROCEDURE — G8431 POS CLIN DEPRES SCRN F/U DOC: HCPCS | Performed by: NURSE PRACTITIONER

## 2021-05-18 RX ORDER — SERTRALINE HYDROCHLORIDE 100 MG/1
200 TABLET, FILM COATED ORAL DAILY
Qty: 180 TABLET | Refills: 0 | Status: SHIPPED | OUTPATIENT
Start: 2021-05-18 | End: 2021-08-18 | Stop reason: SDUPTHER

## 2021-05-18 ASSESSMENT — ENCOUNTER SYMPTOMS
NAUSEA: 0
DIARRHEA: 0
VOMITING: 0
WHEEZING: 0
SHORTNESS OF BREATH: 0
CONSTIPATION: 0
COUGH: 0
ABDOMINAL PAIN: 0

## 2021-05-18 ASSESSMENT — PATIENT HEALTH QUESTIONNAIRE - PHQ9
7. TROUBLE CONCENTRATING ON THINGS, SUCH AS READING THE NEWSPAPER OR WATCHING TELEVISION: 2
3. TROUBLE FALLING OR STAYING ASLEEP: 2
SUM OF ALL RESPONSES TO PHQ QUESTIONS 1-9: 11
10. IF YOU CHECKED OFF ANY PROBLEMS, HOW DIFFICULT HAVE THESE PROBLEMS MADE IT FOR YOU TO DO YOUR WORK, TAKE CARE OF THINGS AT HOME, OR GET ALONG WITH OTHER PEOPLE: 1
SUM OF ALL RESPONSES TO PHQ9 QUESTIONS 1 & 2: 1

## 2021-05-18 NOTE — PROGRESS NOTES
Office Visit   5/18/2021    Subjective:  Chief Complaint   Patient presents with    3 Month Follow-Up     anxiety/depression    Burn     on right hand, works in the kitchen     HPI:   Sharif Turner is a 23 y.o. female who presents to the clinic today for follow up. Depression- Taking Zoloft 200 mg PO daily. She states she thinks her mood is better on this dose. Life stressors, so she has intermittent dysphoric moods. Moving, which is stressful. However, likes the new place. She would like to continue on this medication regimen. Denies side effects. Denies SI/HI.  Seeing psychology.     Snoring- Referred to sleep medicine.     Allergic rhinitis- taking zyrtec PRN. Well controlled at this time.     Obesity- not exercising- trying to walk more. Diet is stable- trying to increase fruit and vegetables. 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 She states this happened 1.5 weeks ago. She states that this is improving. No drainage/discharge. Review of Systems   Constitutional: Negative for chills, fatigue, fever and unexpected weight change. Eyes: Negative for visual disturbance. Respiratory: Negative for cough, shortness of breath and wheezing. Cardiovascular: Negative for chest pain, palpitations and leg swelling. Gastrointestinal: Negative for abdominal pain, constipation, diarrhea, nausea and vomiting. Skin: Negative for pallor and rash. Hand burn   Neurological: Negative for dizziness, weakness, light-headedness, numbness and headaches. Psychiatric/Behavioral: Positive for dysphoric mood. Negative for self-injury, sleep disturbance and suicidal ideas. The patient is not nervous/anxious.       Allergies   Allergen Reactions    Ceftin [Cefuroxime] Rash     Current Outpatient Rx   Medication Sig Dispense Refill    sertraline (ZOLOFT) 100 MG tablet Take 2 tablets by mouth daily 180 tablet 0    clobetasol (TEMOVATE) 0.05 % cream Apply thin layer once or twice daily to eczema prone skin. Try to break for a full 1 week after 3 weeks on the same area. 60 g 0    cetirizine (ZYRTEC) 10 MG tablet Take 10 mg by mouth daily       Patient Active Problem List   Diagnosis    Allergic rhinitis due to allergen    Anxiety and depression    Moderate episode of recurrent major depressive disorder ([facility] [address]    BRENTON (generalized anxiety disorder)    Morbidly obese (HCC)    Radial styloid tenosynovitis      Wt Readings from Last 3 Encounters:   05/18/21 246 lb 12.8 oz (111.9 kg)   02/17/21 244 lb (110.7 kg)   01/20/21 246 lb (111.6 kg)     BP Readings from Last 3 Encounters:   05/18/21 124/80   02/17/21 138/82   01/20/21 130/78     The ASCVD Risk score (Danielito Ballesteros., et al., 2013) failed to calculate for the following reasons: The 2013 ASCVD risk score is only valid for ages 36 to 78    PHQ-9 Total Score: 11 (5/18/2021  9:58 AM)  Thoughts that you would be better off dead, or of hurting yourself in some way: 0 (5/18/2021  9:58 AM)    Objective/Physical Exam:  /80   Pulse 72   Wt 246 lb 12.8 oz (111.9 kg)   BMI 42.36 kg/m²   Body mass index is 42.36 kg/m². Physical Exam  Vitals reviewed. Constitutional:       General: She is not in acute distress. Appearance: She is well-developed. She is not diaphoretic. HENT:      Head: Normocephalic and atraumatic. Eyes:      Pupils: Pupils are equal, round, and reactive to light. Cardiovascular:      Rate and Rhythm: Normal rate and regular rhythm. Pulmonary:      Effort: Pulmonary effort is normal. No respiratory distress. Breath sounds: Normal breath sounds. No wheezing or rales. Chest:      Chest wall: No tenderness. Abdominal:      General: Bowel sounds are normal.      Palpations: Abdomen is soft. Skin:     General: Skin is warm and dry. Comments: 1.5 x 2 cm pink burn with small well-healing opening in the center.    Neurological:      Mental Status: She is alert and oriented to person, place, and time. Coordination: Coordination normal.   Psychiatric:         Mood and Affect: Mood normal.       Assessment and Plan:  Hanna cardona was seen today for 3 month follow-up and burn. Diagnoses and all orders for this visit:    Anxiety and depression/Positive depression screening  -    Life stressors present. No side effects on this medication  - Pt reports that she would like to stay on this current regimen and continue with psychology. - sertraline (ZOLOFT) 100 MG tablet; Take 2 tablets by mouth daily- refilled today  -     Positive Screen for Clinical Depression with a Documented Follow-up Plan     Snoring   - Recommend patient schedule with sleep medicine    Seasonal allergic rhinitis due to other allergic trigger   - Well-controlled. Denies side effects. Class 3 severe obesity due to excess calories without serious comorbidity with body mass index (BMI) of 40.0 to 44.9 in [facility])   - Lifestyle modifications such as exercise, weight loss and healthy diet encouraged and reviewed with the pt. Burn   - Improving overall per pt. no drainage or discharge   - Wound care reviewed. - Patient will call if symptoms worsen or fail to improve. Signs and symptoms of infection reviewed with the patient and she will call if these occur. Patient reports she has an OB/GYN and she is going to schedule Pap smear she is due  Return in about 3 months (around 8/18/2021) for annual exam, or sooner if needed. Pt will call if symptoms worsen or fail to improve. All questions answered. Pt states no further questions or concerns at this time.    Electronically signed by: MARS Hutton CNP 05/18/21

## 2021-06-16 ENCOUNTER — VIRTUAL VISIT (OUTPATIENT)
Dept: PSYCHOLOGY | Age: 26
End: 2021-06-16
Payer: MEDICAID

## 2021-06-16 DIAGNOSIS — F33.1 MODERATE EPISODE OF RECURRENT MAJOR DEPRESSIVE DISORDER (HCC): Primary | ICD-10-CM

## 2021-06-16 DIAGNOSIS — F41.1 GAD (GENERALIZED ANXIETY DISORDER): ICD-10-CM

## 2021-06-16 PROCEDURE — 90832 PSYTX W PT 30 MINUTES: CPT | Performed by: PSYCHOLOGIST

## 2021-06-16 NOTE — PROGRESS NOTES
TELEHEALTH VISIT -- Audio/Visual (During FZDBQ-58 public health emergency)  }  Pursuant to the emergency declaration under the [address] [place] [address], [facility] waiver authority and the [employer] Act, this Virtual Visit was conducted, with patient's consent, to reduce the patient's risk of exposure to COVID-19 and provide continuity of care for an established patient. Services were provided through a video synchronous discussion virtually to substitute for in-person clinic visit. Pt gave verbal informed consent to participate in telehealth services. Conducted a risk-benefit analysis and determined that the patient's presenting problems are consistent with the use of telepsychology. Determined that the patient has sufficient knowledge and skills in the use of technology enabling them to adequately benefit from telepsychology. It was determined that this patient was able to be properly treated without an in-person session. Patient verified that they were currently located at the [place] address that was provided during registration or another [place] address, if noted below. Verified the following information:  Patient's identification: Yes  Patient location: [address] [place] [address] [address], pamella [place]   Patient's call back number: [phone]   Patient's emergency contact's name and number, as well as permission to contact them if needed: Extended Emergency Contact Information  Primary Emergency Contact: Tim Fregoso  Address: [address] [place] [address] Phone: [phone]  Relation: Parent     Provider location: Abel Samson, Ph.D.  Psychologist  6/16/2021  1:06 PM      Time spent with Patient: 25 minutes  This is patient's 24th  [facility] appointment.     Reason for Consult:    Chief Complaint   Patient presents with    Depression       Feedback given to PCP. S:  Pt seen for f/u of depression and anxiety. Pt reported stable mood and sxs. Move was so stressful that she developed hives. Happy w her new home and roommate. Work continues to be stressful and commute is longer. Dad has attempted communication a few times and pt is limiting responses. Notices that too much communication w mom worsens her anxiety; Discussed setting appropriate boundaries. On the phone, pt will hang up when mom starts yelling, but struggles to de-escalate when in person. Problem-solved and provided psychoeducation. O:  MSE:    Appearance    alert, cooperative  Appetite normal  Sleep disturbance Yes  Fatigue Yes  Loss of pleasure Yes  Impulsive behavior No  Speech    spontaneous, normal rate, normal volume and well articulated  Mood    Depressed  Affect    depressed affect  Thought Content    intact and cognitive distortions  Thought Process    linear, goal directed and coherent  Associations    logical connections  Insight    Fair  Judgment    Intact  Orientation    oriented to person, place, time, and general circumstances  Memory    recent and remote memory intact  Attention/Concentration    intact  Morbid ideation No  Suicide Assessment    no suicidal ideation    History:  Social History:   Social History     Socioeconomic History    Marital status: Single     Spouse name: Not on file    Number of children: Not on file    Years of education: Not on file    Highest education level: Not on file   Occupational History    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    Tobacco Use    Smoking status: Never Smoker    Smokeless tobacco: Never Used   Vaping Use    Vaping Use: Never used   Substance and Sexual Activity    Alcohol use: Yes     Comment: 1 mikes hard lemonade in 1 month    Drug use: Never    Sexual activity: Not Currently Other Topics Concern    Not on file   Social History Narrative    Pt in  [employer] program. 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 For fun, her life is consumed with homework. Social Determinants of Health     Financial Resource Strain:     Difficulty of Paying Living Expenses:    Food Insecurity:     Worried About Running Out of Food in the Last Year:     [address] in the Last Year:    Transportation Needs:     Lack of Transportation (Medical):  Lack of Transportation (Non-Medical):    Physical Activity:     Days of Exercise per Week:     Minutes of Exercise per Session:    Stress:     Feeling of Stress :    Social Connections:     Frequency of Communication with Friends and Family:     Frequency of Social Gatherings with Friends and Family:     Attends Congregational Services:     Active Member of Clubs or Organizations:     Attends Club or Organization Meetings:     Marital Status:    Intimate Partner Violence:     Fear of Current or Ex-Partner:     Emotionally Abused:     Physically Abused:     Sexually Abused:      TOBACCO:   reports that she has never smoked. She has never used smokeless tobacco.  ETOH:   reports current alcohol use. Diagnosis:  1. Moderate episode of recurrent major depressive disorder ([facility] [address]    2. BRENTON (generalized anxiety disorder)          Plan:  Pt interventions:  Practiced assertive communication and Trained in improving communication skills        Documentation was done using voice recognition dragon software. Every effort was made to ensure accuracy; however, inadvertent, unintentional computerized transcription errors may be present.

## 2021-07-13 ENCOUNTER — VIRTUAL VISIT (OUTPATIENT)
Dept: PSYCHOLOGY | Age: 26
End: 2021-07-13
Payer: MEDICAID

## 2021-07-13 DIAGNOSIS — F33.1 MODERATE EPISODE OF RECURRENT MAJOR DEPRESSIVE DISORDER (HCC): Primary | ICD-10-CM

## 2021-07-13 DIAGNOSIS — F41.1 GAD (GENERALIZED ANXIETY DISORDER): ICD-10-CM

## 2021-07-13 PROCEDURE — 90832 PSYTX W PT 30 MINUTES: CPT | Performed by: PSYCHOLOGIST

## 2021-07-13 NOTE — PROGRESS NOTES
Feedback given to PCP. S:  Pt seen for f/u of depression and anxiety. Pt reported worsened mood and sxs. Noted increased self-deprication, \"punishing\" herself for things for things that she said or did. Thinks she has Borderline PD based on an online screener. Thinks she has an [place] nature\" when it comes to relationships. For example, obsessing about what a coworker meant when he made a sarcastic comment. If someone lies one time she won't trust them again ever. Reviewed sxs and pt did not endorse anything other than sxs r/t attachment. 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 Asking about DBT for her insurance. O:  MSE:    Appearance    alert, cooperative  Appetite normal  Sleep disturbance No  Fatigue Yes  Loss of pleasure No  Impulsive behavior No  Speech    spontaneous, normal rate, normal volume and well articulated  Mood    Depressed  Affect    depressed affect  Thought Content    intact  Thought Process    goal directed and coherent  Associations    logical connections  Insight    Fair  Judgment    Intact  Orientation    oriented to person, place, time, and general circumstances  Memory    recent and remote memory intact  Attention/Concentration    impaired  Morbid ideation No  Suicide Assessment    no suicidal ideation    History:  Social History:   Social History     Socioeconomic History    Marital status: Single     Spouse name: Not on file    Number of children: Not on file    Years of education: Not on file    Highest education level: Not on file   Occupational History    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    Tobacco Use    Smoking status: Never Smoker    Smokeless tobacco: Never Used   Vaping Use    Vaping Use: Never used   Substance and Sexual Activity    Alcohol use: Yes     Comment: 1 mikes hard lemonade in 1 month    Drug use: Never    Sexual activity: Not Currently   Other Topics Concern    Not on file   Social History Narrative    Pt in  [employer] program. 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 For fun, her life is consumed with homework. Social Determinants of Health     Financial Resource Strain:     Difficulty of Paying Living Expenses:    Food Insecurity:     Worried About Running Out of Food in the Last Year:     [address] in the Last Year:    Transportation Needs:     Lack of Transportation (Medical):  Lack of Transportation (Non-Medical):    Physical Activity:     Days of Exercise per Week:     Minutes of Exercise per Session:    Stress:     Feeling of Stress :    Social Connections:     Frequency of Communication with Friends and Family:     Frequency of Social Gatherings with Friends and Family:     Attends Latter day Services:     Active Member of Clubs or Organizations:     Attends Club or Organization Meetings:     Marital Status:    Intimate Partner Violence:     Fear of Current or Ex-Partner:     Emotionally Abused:     Physically Abused:     Sexually Abused:      TOBACCO:   reports that she has never smoked. She has never used smokeless tobacco.  ETOH:   reports current alcohol use. Diagnosis:  1. Moderate episode of recurrent major depressive disorder ([address]    2. BRENTON (generalized anxiety disorder)          Plan:  Pt interventions:  Conducted functional assessment, Supportive techniques, Provided Psychoeducation re: Borderline PD and DBT and Identified maladaptive thoughts        Documentation was done using voice recognition dragon software. Every effort was made to ensure accuracy; however, inadvertent, unintentional computerized transcription errors may be present.

## 2021-07-28 DIAGNOSIS — L20.9 ATOPIC DERMATITIS, UNSPECIFIED TYPE: ICD-10-CM

## 2021-07-28 RX ORDER — CLOBETASOL PROPIONATE 0.5 MG/G
CREAM TOPICAL
Qty: 60 G | Refills: 0 | Status: SHIPPED | OUTPATIENT
Start: 2021-07-28 | End: 2022-04-05 | Stop reason: SDUPTHER

## 2021-08-04 ENCOUNTER — VIRTUAL VISIT (OUTPATIENT)
Dept: PSYCHOLOGY | Age: 26
End: 2021-08-04
Payer: MEDICAID

## 2021-08-04 DIAGNOSIS — F41.1 GAD (GENERALIZED ANXIETY DISORDER): ICD-10-CM

## 2021-08-04 DIAGNOSIS — F33.1 MODERATE EPISODE OF RECURRENT MAJOR DEPRESSIVE DISORDER (HCC): Primary | ICD-10-CM

## 2021-08-04 PROCEDURE — 90832 PSYTX W PT 30 MINUTES: CPT | Performed by: PSYCHOLOGIST

## 2021-08-04 NOTE — PROGRESS NOTES
TELEHEALTH VISIT -- Audio/Visual (During GOPKW-26 public health emergency)  }  Pursuant to the emergency declaration under the [address] [place] [address] [facility] authority and the [employer] Act, this Virtual Visit was conducted, with patient's consent, to reduce the patient's risk of exposure to COVID-19 and provide continuity of care for an established patient. Services were provided through a video synchronous discussion virtually to substitute for in-person clinic visit. Pt gave verbal informed consent to participate in telehealth services. Conducted a risk-benefit analysis and determined that the patient's presenting problems are consistent with the use of telepsychology. Determined that the patient has sufficient knowledge and skills in the use of technology enabling them to adequately benefit from telepsychology. It was determined that this patient was able to be properly treated without an in-person session. Patient verified that they were currently located at the [facility] address that was provided during registration or another [facility] address, if noted below. Verified the following information:  Patient's identification: Yes  [facility] [place] [id number]   Patient's call back number: [phone]   Patient's emergency contact's name and number, as well as permission to contact them if needed: Extended Emergency Contact Information  Primary Emergency Contact: Rachel Hackett  Address: [address] [place] [address] Phone: [phone]  Relation: Parent     Provider location: [facility]  Hugh Norris, Ph.D.  Psychologist  8/4/2021  9:47 AM      Time spent with Patient: 30 minutes  This is patient's 26th  [facility] appointment.     Reason for Consult:    Chief Complaint   Patient presents with   Joshua Self Depression Feedback given to PCP. S:  Pt seen for f/u of depression and anxiety. Pt reported stable mood and sxs. 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 Discussed preparing for goodbyes, interviews. O:  MSE:    Appearance    alert, cooperative  Appetite normal  Sleep disturbance No  Fatigue Yes  Loss of pleasure No  Impulsive behavior No  Speech    spontaneous, normal rate, normal volume and well articulated  Mood    Depressed  Affect    depressed affect  Thought Content    intact  Thought Process    goal directed and coherent  Associations    logical connections  Insight    Fair  Judgment    Intact  Orientation    oriented to person, place, time, and general circumstances  Memory    recent and remote memory intact  Attention/Concentration    impaired  Morbid ideation No  Suicide Assessment    no suicidal ideation    History:  Social History:   Social History     Socioeconomic History    Marital status: Single     Spouse name: Not on file    Number of children: Not on file    Years of education: Not on file    Highest education level: Not on file   Occupational History    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 Tobacco Use    Smoking status: Never Smoker    Smokeless tobacco: Never Used   Vaping Use    Vaping Use: Never used   Substance and Sexual Activity    Alcohol use: Yes     Comment: 1 mikes hard lemonade in 1 month    Drug use: Never    Sexual activity: Not Currently   Other Topics Concern    Not on file   Social History Narrative    Pt in  [employer] program. 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 For fun, her life is consumed with homework.      Social Determinants of Health     Financial Resource Strain:     Difficulty of Paying Living Expenses:    Food Insecurity:     Worried About Running Out of Food in the Last Year:     [address] in the Last Year:    Transportation Needs:     Lack of Transportation (Medical):  Lack of Transportation (Non-Medical):    Physical Activity:     Days of Exercise per Week:     Minutes of Exercise per Session:    Stress:     Feeling of Stress :    Social Connections:     Frequency of Communication with Friends and Family:     Frequency of Social Gatherings with Friends and Family:     Attends Shinto Services:     Active Member of Clubs or Organizations:     Attends Club or Organization Meetings:     Marital Status:    Intimate Partner Violence:     Fear of Current or Ex-Partner:     Emotionally Abused:     Physically Abused:     Sexually Abused:      TOBACCO:   reports that she has never smoked. She has never used smokeless tobacco.  ETOH:   reports current alcohol use. Diagnosis:  1. Moderate episode of recurrent major depressive disorder ([facility] [address]    2. BRENTON (generalized anxiety disorder)          Plan:  Pt interventions:  Trained in strategies for increasing balanced thinking, Trained in improving communication skills, Discussed self-care (sleep, nutrition, rewarding activities, social support, exercise) and Discussed and problem-solved barriers in adhering to behavioral change plan        Documentation was done using voice recognition dragon software. Every effort was made to ensure accuracy; however, inadvertent, unintentional computerized transcription errors may be present.

## 2021-08-18 ENCOUNTER — OFFICE VISIT (OUTPATIENT)
Dept: INTERNAL MEDICINE CLINIC | Age: 26
End: 2021-08-18
Payer: MEDICAID

## 2021-08-18 VITALS
WEIGHT: 245.2 LBS | OXYGEN SATURATION: 99 % | HEART RATE: 76 BPM | SYSTOLIC BLOOD PRESSURE: 120 MMHG | BODY MASS INDEX: 42.09 KG/M2 | DIASTOLIC BLOOD PRESSURE: 70 MMHG

## 2021-08-18 DIAGNOSIS — J30.89 SEASONAL ALLERGIC RHINITIS DUE TO OTHER ALLERGIC TRIGGER: ICD-10-CM

## 2021-08-18 DIAGNOSIS — Z13.220 SCREENING, LIPID: ICD-10-CM

## 2021-08-18 DIAGNOSIS — Z00.00 ANNUAL PHYSICAL EXAM: Primary | ICD-10-CM

## 2021-08-18 DIAGNOSIS — F32.A ANXIETY AND DEPRESSION: ICD-10-CM

## 2021-08-18 DIAGNOSIS — R06.83 SNORING: ICD-10-CM

## 2021-08-18 DIAGNOSIS — Z11.59 NEED FOR HEPATITIS C SCREENING TEST: ICD-10-CM

## 2021-08-18 DIAGNOSIS — Z13.31 POSITIVE DEPRESSION SCREENING: ICD-10-CM

## 2021-08-18 DIAGNOSIS — E66.01 CLASS 3 SEVERE OBESITY DUE TO EXCESS CALORIES WITHOUT SERIOUS COMORBIDITY WITH BODY MASS INDEX (BMI) OF 40.0 TO 44.9 IN ADULT (HCC): ICD-10-CM

## 2021-08-18 DIAGNOSIS — F41.9 ANXIETY AND DEPRESSION: ICD-10-CM

## 2021-08-18 PROCEDURE — 99395 PREV VISIT EST AGE 18-39: CPT | Performed by: NURSE PRACTITIONER

## 2021-08-18 PROCEDURE — G8431 POS CLIN DEPRES SCRN F/U DOC: HCPCS | Performed by: NURSE PRACTITIONER

## 2021-08-18 RX ORDER — SERTRALINE HYDROCHLORIDE 100 MG/1
200 TABLET, FILM COATED ORAL DAILY
Qty: 180 TABLET | Refills: 0 | Status: SHIPPED | OUTPATIENT
Start: 2021-08-18 | End: 2021-11-02 | Stop reason: SDUPTHER

## 2021-08-18 RX ORDER — BUPROPION HYDROCHLORIDE 150 MG/1
150 TABLET ORAL EVERY MORNING
Qty: 30 TABLET | Refills: 1 | Status: SHIPPED | OUTPATIENT
Start: 2021-08-18 | End: 2021-09-21 | Stop reason: SDUPTHER

## 2021-08-18 SDOH — ECONOMIC STABILITY: FOOD INSECURITY: WITHIN THE PAST 12 MONTHS, YOU WORRIED THAT YOUR FOOD WOULD RUN OUT BEFORE YOU GOT MONEY TO BUY MORE.: NEVER TRUE

## 2021-08-18 SDOH — ECONOMIC STABILITY: FOOD INSECURITY: WITHIN THE PAST 12 MONTHS, THE FOOD YOU BOUGHT JUST DIDN'T LAST AND YOU DIDN'T HAVE MONEY TO GET MORE.: NEVER TRUE

## 2021-08-18 ASSESSMENT — ENCOUNTER SYMPTOMS
VOMITING: 0
NAUSEA: 0
DIARRHEA: 0
SORE THROAT: 0
CONSTIPATION: 0
WHEEZING: 0
COUGH: 0
SINUS PAIN: 0
ABDOMINAL PAIN: 0
SHORTNESS OF BREATH: 0

## 2021-08-18 ASSESSMENT — COLUMBIA-SUICIDE SEVERITY RATING SCALE - C-SSRS
2. HAVE YOU ACTUALLY HAD ANY THOUGHTS OF KILLING YOURSELF?: NO
1. WITHIN THE PAST MONTH, HAVE YOU WISHED YOU WERE DEAD OR WISHED YOU COULD GO TO SLEEP AND NOT WAKE UP?: NO
6. HAVE YOU EVER DONE ANYTHING, STARTED TO DO ANYTHING, OR PREPARED TO DO ANYTHING TO END YOUR LIFE?: NO

## 2021-08-18 ASSESSMENT — PATIENT HEALTH QUESTIONNAIRE - PHQ9
SUM OF ALL RESPONSES TO PHQ QUESTIONS 1-9: 12
SUM OF ALL RESPONSES TO PHQ QUESTIONS 1-9: 12
2. FEELING DOWN, DEPRESSED OR HOPELESS: 2
6. FEELING BAD ABOUT YOURSELF - OR THAT YOU ARE A FAILURE OR HAVE LET YOURSELF OR YOUR FAMILY DOWN: 2
4. FEELING TIRED OR HAVING LITTLE ENERGY: 3
10. IF YOU CHECKED OFF ANY PROBLEMS, HOW DIFFICULT HAVE THESE PROBLEMS MADE IT FOR YOU TO DO YOUR WORK, TAKE CARE OF THINGS AT HOME, OR GET ALONG WITH OTHER PEOPLE: 1
SUM OF ALL RESPONSES TO PHQ QUESTIONS 1-9: 12
3. TROUBLE FALLING OR STAYING ASLEEP: 3
1. LITTLE INTEREST OR PLEASURE IN DOING THINGS: 1
5. POOR APPETITE OR OVEREATING: 1
7. TROUBLE CONCENTRATING ON THINGS, SUCH AS READING THE NEWSPAPER OR WATCHING TELEVISION: 0
9. THOUGHTS THAT YOU WOULD BE BETTER OFF DEAD, OR OF HURTING YOURSELF: 0
8. MOVING OR SPEAKING SO SLOWLY THAT OTHER PEOPLE COULD HAVE NOTICED. OR THE OPPOSITE, BEING SO FIGETY OR RESTLESS THAT YOU HAVE BEEN MOVING AROUND A LOT MORE THAN USUAL: 0
SUM OF ALL RESPONSES TO PHQ9 QUESTIONS 1 & 2: 3

## 2021-08-18 ASSESSMENT — SOCIAL DETERMINANTS OF HEALTH (SDOH): HOW HARD IS IT FOR YOU TO PAY FOR THE VERY BASICS LIKE FOOD, HOUSING, MEDICAL CARE, AND HEATING?: NOT HARD AT ALL

## 2021-08-18 NOTE — PROGRESS NOTES
Annual Exam Office Visit  8/18/2021    Subjective:  Chief Complaint   Patient presents with    Annual Exam     HPI:  Miguel Anderson is a 23 y.o. female who presents to the clinic today for an annual exam.     Depression- Taking Zoloft 200 mg PO daily. She states things \"are really rough with work. \" Reports low energy and more dysphoric mood. States she is stressed with work- but denies anxiety. Denies anxiety attacks. Denies side effects. Denies SI/HI.  Seeing psychology.     Snoring- Referred to sleep medicine. Did not schedule.     Allergic rhinitis- taking zyrtec PRN.  Well controlled at this time.     Obesity- trying to walk more. Diet is stable. Works at [employer]. Lives with her friend. 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 For fun, she enjoys gardening - has Humberto, strawberries, spearmints, succulents and ferns    Review of Systems   Constitutional: Negative for chills and fever. HENT: Negative for congestion, postnasal drip, sinus pain and sore throat. Eyes: Negative for visual disturbance. Respiratory: Negative for cough, shortness of breath and wheezing. Snoring   Cardiovascular: Negative for chest pain, palpitations and leg swelling. Gastrointestinal: Negative for abdominal pain, constipation, diarrhea, nausea and vomiting. Genitourinary: Negative for dysuria, frequency, hematuria and urgency. Skin: Negative for pallor and rash. Neurological: Negative for dizziness, weakness, light-headedness, numbness and headaches. Psychiatric/Behavioral: Positive for dysphoric mood and sleep disturbance. Negative for self-injury and suicidal ideas. The patient is not nervous/anxious.       Allergies   Allergen Reactions    Ceftin [Cefuroxime] Rash     Family History   Problem Relation Age of Onset    No Known Problems Mother     Hypertension Father     Other Father         Romero's esophagus    Lung Cancer Maternal Grandmother     Diabetes Paternal Grandmother     Cancer Paternal Filemon Wheeler Breast Cancer Neg Hx     Ovarian Cancer Neg Hx     Heart Attack Neg Hx     Stroke Neg Hx      Current Outpatient Rx   Medication Sig Dispense Refill    buPROPion (WELLBUTRIN XL) 150 MG extended release tablet Take 1 tablet by mouth every morning 30 tablet 1    sertraline (ZOLOFT) 100 MG tablet Take 2 tablets by mouth daily 180 tablet 0    clobetasol (TEMOVATE) 0.05 % cream Apply thin layer once or twice daily to eczema prone skin. Try to break for a full 1 week after 3 weeks on the same area. 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 Tobacco Use    Smoking status: Never Smoker    Smokeless tobacco: Never Used   Vaping Use    Vaping Use: Never used   Substance and Sexual Activity    Alcohol use: Yes     Comment: 1 mikes hard lemonade in 1 month    Drug use: Never    Sexual activity: Not Currently   Other Topics Concern    Not on file   Social History Narrative    Works at [employer]. Lives with her friend. 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 For fun, she enjoys gardening. Social Determinants of Health     Financial Resource Strain: Low Risk     Difficulty of Paying Living Expenses: Not hard at all   Food Insecurity: No Food Insecurity    Worried About Running Out of Food in the Last Year: Never true    Batsheva of Food in the Last Year: Never true   Transportation Needs:     Lack of Transportation (Medical):      Lack of Transportation (Non-Medical):    Physical Activity:     Days of Exercise per Week:     Minutes of Exercise per Session:    Stress:     Feeling of Stress :    Social Connections:     Frequency of Communication with Friends and Family:     Frequency of Social Gatherings with Friends and Family:     Attends Mu-ism Services:     Active Member of Clubs or Organizations:     Attends Club or Organization Meetings:     Marital Status:    Intimate Partner Violence:     Fear of Current or Ex-Partner:     Emotionally Abused:     Physically Abused:     Sexually Abused:      Past Medical History:   Diagnosis Date    Chronic eczema     Depression     Seasonal allergies     Snoring      Patient Active Problem List   Diagnosis    Allergic rhinitis due to allergen    Anxiety and depression    Moderate episode of recurrent major depressive disorder ([facility] [address]    BRENTON (generalized anxiety disorder)    Morbidly obese ([facility] [address]    Radial styloid tenosynovitis    Snoring      Wt Readings from Last 3 Encounters:   08/18/21 245 lb 3.2 oz (111.2 kg)   05/18/21 246 lb 12.8 oz (111.9 kg)   02/17/21 244 lb (110.7 kg)     BP Readings from Last 3 Encounters:   08/18/21 120/70   05/18/21 124/80   02/17/21 138/82     The ASCVD Risk score (Ximena Du, et al., 2013) failed to calculate for the following reasons: The 2013 ASCVD risk score is only valid for ages 36 to 78    PHQ-9 Total Score: 12 (8/18/2021 11:25 AM)  Thoughts that you would be better off dead, or of hurting yourself in some way: 0 (8/18/2021 11:25 AM)    Objective/Physical Exam:  /70   Pulse 76   Wt 245 lb 3.2 oz (111.2 kg)   LMP 08/13/2021   SpO2 99%   Breastfeeding No   BMI 42.09 kg/m²   Body mass index is 42.09 kg/m². Physical Exam  Vitals reviewed. Constitutional:       General: She is not in acute distress. Appearance: She is well-developed. She is not diaphoretic. HENT:      Head: Normocephalic and atraumatic. Right Ear: Tympanic membrane and external ear normal.      Left Ear: Tympanic membrane and external ear normal.   Eyes:      Pupils: Pupils are equal, round, and reactive to light.    Cardiovascular:      Rate and Rhythm: Normal rate and regular rhythm. Pulmonary:      Effort: Pulmonary effort is normal. No respiratory distress. Breath sounds: Normal breath sounds. No wheezing or rales. Chest:      Chest wall: No tenderness. Abdominal:      General: Bowel sounds are normal. There is no distension. Palpations: Abdomen is soft. Tenderness: There is no abdominal tenderness. There is no guarding. Skin:     General: Skin is warm and dry. Neurological:      Mental Status: She is alert and oriented to person, place, and time. Coordination: Coordination normal.   Psychiatric:         Mood and Affect: Mood normal.       Assessment and Plan:  Domonique Castro was seen today for annual exam.    Diagnoses and all orders for this visit:    Annual physical exam   - Has not scheduled with OBGYN- Plans to do so. - Feels safe in home and relationships.   - Wears seatbelt in the car. - Lifestyle modifications such as exercise, weight loss and healthy diet encouraged and reviewed with the pt. Anxiety and depression/Positive depression screening  -    Not well controlled. Increased depression. Denies anxiety. - Options reviewed. Patient agreeable to continuing Zoloft and adding Wellbutrin  - TSH WITH REFLEX TO FT4; Future  -     CBC Auto Differential; Future  -     COMPREHENSIVE METABOLIC PANEL; Future  -     buPROPion (WELLBUTRIN XL) 150 MG extended release tablet; Take 1 tablet by mouth every morning - patient education handout provided and reviewed with the pt. -     sertraline (ZOLOFT) 100 MG tablet; Take 2 tablets by mouth daily- refilled today  -     Positive Screen for Clinical Depression with a Documented Follow-up Plan   - F/u 4 weeks. Snoring   - Recommend patient call to schedule with sleep medicine. Referral reprinted and phone number provided. Seasonal allergic rhinitis due to other allergic trigger   - Well-controlled on current regimen.     Class 3 severe obesity due to excess calories without serious comorbidity with body mass index (BMI) of 40.0 to 44.9 in adult [facility])  -    Lifestyle modifications such as exercise, weight loss and healthy diet encouraged and reviewed with the pt.   - TSH WITH REFLEX TO FT4; Future    Need for hepatitis C screening test  -    Asymptomatic. Patient agreeable  - HEPATITIS C ANTIBODY; Future    Screening, lipid  -     Lipid, Fasting; Future    Return in about 4 weeks (around 9/15/2021) for mood f/u, or sooner if needed. Pt will call if symptoms worsen or fail to improve. All questions answered. Pt states no further questions or concerns at this time.    Electronically signed by: MARS Turner CNP 08/18/21

## 2021-08-18 NOTE — PATIENT INSTRUCTIONS
Please get your fasting lab work (no food or drink for 10-12 hours prior besides water) completed M-F [id number] at our office. [facility] lab has walk-in hours available as well - they are open Saturday 7a-3p - no appointment is needed. We will call with your results.     Call to schedule with sleep medicine and OBGYN

## 2021-09-01 ENCOUNTER — VIRTUAL VISIT (OUTPATIENT)
Dept: PSYCHOLOGY | Age: 26
End: 2021-09-01
Payer: MEDICAID

## 2021-09-01 DIAGNOSIS — F41.1 GAD (GENERALIZED ANXIETY DISORDER): ICD-10-CM

## 2021-09-01 DIAGNOSIS — F33.1 MODERATE EPISODE OF RECURRENT MAJOR DEPRESSIVE DISORDER (HCC): Primary | ICD-10-CM

## 2021-09-01 PROCEDURE — 90832 PSYTX W PT 30 MINUTES: CPT | Performed by: PSYCHOLOGIST

## 2021-09-01 NOTE — PROGRESS NOTES
TELEHEALTH VISIT -- Audio/Visual (During IXIUH-48 public health emergency)  }  Pursuant to the emergency declaration under the [address] [place] [address], [facility] waiver authority and the [employer] Act, this Virtual Visit was conducted, with patient's consent, to reduce the patient's risk of exposure to COVID-19 and provide continuity of care for an established patient. Services were provided through a video synchronous discussion virtually to substitute for in-person clinic visit. Pt gave verbal informed consent to participate in telehealth services. Conducted a risk-benefit analysis and determined that the patient's presenting problems are consistent with the use of telepsychology. Determined that the patient has sufficient knowledge and skills in the use of technology enabling them to adequately benefit from telepsychology. It was determined that this patient was able to be properly treated without an in-person session. Patient verified that they were currently located at the [facility] address that was provided during registration or another [facility] address, if noted below. Verified the following information:  Patient's identification: Yes  Patient location: [place] [id number]   Patient's call back number: [phone]   Patient's emergency contact's name and number, as well as permission to contact them if needed: Extended Emergency Contact Information  Primary Emergency Contact: Rhonda Carmen  Address: [address] [place] [address] Phone: [phone]  Relation: Parent     Provider location: Avelina López, Ph.D.  Psychologist  9/1/2021  9:45 AM      Time spent with Patient: 25 minutes  This is patient's 27th  [facility] appointment.     Reason for Consult:    Chief Complaint   Patient presents with   Shy See Depression About Running Out of Food in the Last Year: Never true    Ran Out of Food in the Last Year: Never true   Transportation Needs:     Lack of Transportation (Medical):  Lack of Transportation (Non-Medical):    Physical Activity:     Days of Exercise per Week:     Minutes of Exercise per Session:    Stress:     Feeling of Stress :    Social Connections:     Frequency of Communication with Friends and Family:     Frequency of Social Gatherings with Friends and Family:     Attends Christianity Services:     Active Member of Clubs or Organizations:     Attends Club or Organization Meetings:     Marital Status:    Intimate Partner Violence:     Fear of Current or Ex-Partner:     Emotionally Abused:     Physically Abused:     Sexually Abused:      TOBACCO:   reports that she has never smoked. She has never used smokeless tobacco.  ETOH:   reports current alcohol use. Diagnosis:  1. Moderate episode of recurrent major depressive disorder ([facility] [address]    2. BRENTON (generalized anxiety disorder)          Plan:  Pt interventions:  Trained in strategies for increasing balanced thinking, Discussed self-care (sleep, nutrition, rewarding activities, social support, exercise) and Identified maladaptive thoughts        Documentation was done using voice recognition dragon software. Every effort was made to ensure accuracy; however, inadvertent, unintentional computerized transcription errors may be present.

## 2021-09-21 ENCOUNTER — OFFICE VISIT (OUTPATIENT)
Dept: INTERNAL MEDICINE CLINIC | Age: 26
End: 2021-09-21
Payer: MEDICAID

## 2021-09-21 VITALS
OXYGEN SATURATION: 99 % | HEART RATE: 68 BPM | DIASTOLIC BLOOD PRESSURE: 80 MMHG | WEIGHT: 246.8 LBS | SYSTOLIC BLOOD PRESSURE: 118 MMHG | BODY MASS INDEX: 42.36 KG/M2

## 2021-09-21 DIAGNOSIS — F32.A ANXIETY AND DEPRESSION: Primary | ICD-10-CM

## 2021-09-21 DIAGNOSIS — F41.9 ANXIETY AND DEPRESSION: Primary | ICD-10-CM

## 2021-09-21 DIAGNOSIS — Z13.31 POSITIVE DEPRESSION SCREENING: ICD-10-CM

## 2021-09-21 PROCEDURE — 1036F TOBACCO NON-USER: CPT | Performed by: NURSE PRACTITIONER

## 2021-09-21 PROCEDURE — 99213 OFFICE O/P EST LOW 20 MIN: CPT | Performed by: NURSE PRACTITIONER

## 2021-09-21 PROCEDURE — G8427 DOCREV CUR MEDS BY ELIG CLIN: HCPCS | Performed by: NURSE PRACTITIONER

## 2021-09-21 PROCEDURE — G8431 POS CLIN DEPRES SCRN F/U DOC: HCPCS | Performed by: NURSE PRACTITIONER

## 2021-09-21 PROCEDURE — G8417 CALC BMI ABV UP PARAM F/U: HCPCS | Performed by: NURSE PRACTITIONER

## 2021-09-21 RX ORDER — NORETHINDRONE ACETATE AND ETHINYL ESTRADIOL 1.5-30(21)
1 KIT ORAL DAILY
COMMUNITY
Start: 2021-09-21

## 2021-09-21 RX ORDER — BUPROPION HYDROCHLORIDE 150 MG/1
150 TABLET ORAL 2 TIMES DAILY
Qty: 60 TABLET | Refills: 0 | Status: SHIPPED | OUTPATIENT
Start: 2021-09-21 | End: 2021-09-23 | Stop reason: SDUPTHER

## 2021-09-21 ASSESSMENT — PATIENT HEALTH QUESTIONNAIRE - PHQ9
3. TROUBLE FALLING OR STAYING ASLEEP: 2
2. FEELING DOWN, DEPRESSED OR HOPELESS: 1
8. MOVING OR SPEAKING SO SLOWLY THAT OTHER PEOPLE COULD HAVE NOTICED. OR THE OPPOSITE, BEING SO FIGETY OR RESTLESS THAT YOU HAVE BEEN MOVING AROUND A LOT MORE THAN USUAL: 0
SUM OF ALL RESPONSES TO PHQ QUESTIONS 1-9: 7
1. LITTLE INTEREST OR PLEASURE IN DOING THINGS: 1
5. POOR APPETITE OR OVEREATING: 0
SUM OF ALL RESPONSES TO PHQ QUESTIONS 1-9: 7
SUM OF ALL RESPONSES TO PHQ QUESTIONS 1-9: 7
6. FEELING BAD ABOUT YOURSELF - OR THAT YOU ARE A FAILURE OR HAVE LET YOURSELF OR YOUR FAMILY DOWN: 1
10. IF YOU CHECKED OFF ANY PROBLEMS, HOW DIFFICULT HAVE THESE PROBLEMS MADE IT FOR YOU TO DO YOUR WORK, TAKE CARE OF THINGS AT HOME, OR GET ALONG WITH OTHER PEOPLE: 1
4. FEELING TIRED OR HAVING LITTLE ENERGY: 2
SUM OF ALL RESPONSES TO PHQ9 QUESTIONS 1 & 2: 2
9. THOUGHTS THAT YOU WOULD BE BETTER OFF DEAD, OR OF HURTING YOURSELF: 0
7. TROUBLE CONCENTRATING ON THINGS, SUCH AS READING THE NEWSPAPER OR WATCHING TELEVISION: 0

## 2021-09-21 ASSESSMENT — ENCOUNTER SYMPTOMS
COUGH: 0
SHORTNESS OF BREATH: 0
WHEEZING: 0

## 2021-09-21 NOTE — PROGRESS NOTES
Office Visit   9/21/2021    Subjective:  Chief Complaint   Patient presents with    1 Month Follow-Up     mood, says it is a little better than her last visit      HPI:   Julio C Hancock is a 23 y.o. female who presents to the clinic today for follow up.     Depression- Taking Zoloft 200 mg PO daily and last visit, Wellbutrin 150 mg daily was started. Reports that she thinks this helps occasionally, but not all day long. States that she feels less depressed \"sometimes. \" Denies side effects. States she is getting offered a better position at work- but the situation is complicated. Denies anxiety attacks. Denies SI/HI.  Seeing psychology. Review of Systems   Constitutional: Negative for chills, fatigue, fever and unexpected weight change. Eyes: Negative for visual disturbance. Respiratory: Negative for cough, shortness of breath and wheezing. Cardiovascular: Negative for chest pain, palpitations and leg swelling. Skin: Negative for pallor and rash. Psychiatric/Behavioral: Positive for dysphoric mood. Negative for self-injury, sleep disturbance and suicidal ideas. The patient is not nervous/anxious. Allergies   Allergen Reactions    Ceftin [Cefuroxime] Rash     Current Outpatient Rx   Medication Sig Dispense Refill    norethindrone-ethinyl estradiol-iron (MICROGESTIN FE1.5/30) 1.5-30 MG-MCG tablet Take 1 tablet by mouth daily      buPROPion (WELLBUTRIN XL) 150 MG extended release tablet Take 1 tablet by mouth 2 times daily 60 tablet 0    sertraline (ZOLOFT) 100 MG tablet Take 2 tablets by mouth daily 180 tablet 0    clobetasol (TEMOVATE) 0.05 % cream Apply thin layer once or twice daily to eczema prone skin. Try to break for a full 1 week after 3 weeks on the same area.  60 g 0    cetirizine (ZYRTEC) 10 MG tablet Take 10 mg by mouth daily       Patient Active Problem List   Diagnosis    Allergic rhinitis due to allergen    Anxiety and depression    Moderate episode of recurrent major depressive disorder ([facility] [address]    BRENTON (generalized anxiety disorder)    Morbidly obese (HCC)    Radial styloid tenosynovitis    Snoring      Wt Readings from Last 3 Encounters:   09/21/21 246 lb 12.8 oz (111.9 kg)   08/18/21 245 lb 3.2 oz (111.2 kg)   05/18/21 246 lb 12.8 oz (111.9 kg)     BP Readings from Last 3 Encounters:   09/21/21 118/80   08/18/21 120/70   05/18/21 124/80     The ASCVD Risk score (Vic Martinez, et al., 2013) failed to calculate for the following reasons: The 2013 ASCVD risk score is only valid for ages 36 to 78    PHQ-9 Total Score: 7 (9/21/2021  1:26 PM)  Thoughts that you would be better off dead, or of hurting yourself in some way: 0 (9/21/2021  1:26 PM)    Objective/Physical Exam:  /80   Pulse 68   Wt 246 lb 12.8 oz (111.9 kg)   SpO2 99%   BMI 42.36 kg/m²   Body mass index is 42.36 kg/m². Physical Exam  Vitals reviewed. Constitutional:       General: She is not in acute distress. Appearance: She is well-developed. She is not diaphoretic. HENT:      Head: Normocephalic and atraumatic. Cardiovascular:      Rate and Rhythm: Normal rate and regular rhythm. Pulmonary:      Effort: Pulmonary effort is normal. No respiratory distress. Breath sounds: Normal breath sounds. No wheezing or rales. Chest:      Chest wall: No tenderness. Skin:     General: Skin is warm and dry. Neurological:      Mental Status: She is alert and oriented to person, place, and time. Coordination: Coordination normal.   Psychiatric:         Mood and Affect: Mood normal.       Assessment and Plan:  Jung Crocker was seen today for 1 month follow-up. Diagnoses and all orders for this visit:    Anxiety and depression  - Last PHQ9 was 12. PHQ9 is 7. Denies SI/HI. - Pt reports her mood is doing better, but not to goal. Denies side effects.  - Continue with zoloft and increase wellbutrin. Pt agreeable. - Continue with psychology.   - buPROPion (WELLBUTRIN XL) 150 MG extended release tablet; Take 1 tablet by mouth 2 times daily- increased dose. - Pt will call if symptoms worsen or fail to improve  - Red flag warning signs reviewed with the pt and she will go to the ER if these occur. Pt states that she made an appt with sleep medicine. Return in about 4 weeks (around 10/19/2021) for mood f/u, or sooner if needed. Pt will call if symptoms worsen or fail to improve. All questions answered. Pt states no further questions or concerns at this time.    Electronically signed by: MARS Dean CNP 09/21/21

## 2021-09-22 ENCOUNTER — TELEPHONE (OUTPATIENT)
Dept: ORTHOPEDIC SURGERY | Age: 26
End: 2021-09-22

## 2021-09-22 DIAGNOSIS — F41.9 ANXIETY AND DEPRESSION: ICD-10-CM

## 2021-09-22 DIAGNOSIS — F32.A ANXIETY AND DEPRESSION: ICD-10-CM

## 2021-09-22 NOTE — TELEPHONE ENCOUNTER
PA submitted via CMM for buPROPion HCl ER (XL) 150MG er tablets.   Key: [id number] - PA Case ID: [id number] - Rx #: [id number]    STATUS: PENDING

## 2021-09-23 RX ORDER — BUPROPION HYDROCHLORIDE 300 MG/1
300 TABLET ORAL EVERY MORNING
Qty: 30 TABLET | Refills: 0 | Status: SHIPPED | OUTPATIENT
Start: 2021-09-23 | End: 2021-11-02 | Stop reason: SDUPTHER

## 2021-09-23 NOTE — TELEPHONE ENCOUNTER
Please call and inform patient that her insurance declined Wellbutrin 150 mg twice daily. However, they are approving Wellbutrin 300 mg once daily. This was sent to her pharmacy.

## 2021-09-29 ENCOUNTER — VIRTUAL VISIT (OUTPATIENT)
Dept: PSYCHOLOGY | Age: 26
End: 2021-09-29
Payer: MEDICAID

## 2021-09-29 DIAGNOSIS — F33.1 MODERATE EPISODE OF RECURRENT MAJOR DEPRESSIVE DISORDER (HCC): Primary | ICD-10-CM

## 2021-09-29 DIAGNOSIS — F41.1 GAD (GENERALIZED ANXIETY DISORDER): ICD-10-CM

## 2021-09-29 PROCEDURE — 90832 PSYTX W PT 30 MINUTES: CPT | Performed by: PSYCHOLOGIST

## 2021-09-29 NOTE — PROGRESS NOTES
TELEHEALTH VISIT -- Audio/Visual (During PBEOJ-23 public health emergency)  }  Pursuant to the emergency declaration under the [address] [place] [address] [facility] authority and the [employer] Act, this Virtual Visit was conducted, with patient's consent, to reduce the patient's risk of exposure to COVID-19 and provide continuity of care for an established patient. Services were provided through a video synchronous discussion virtually to substitute for in-person clinic visit. Pt gave verbal informed consent to participate in telehealth services. Conducted a risk-benefit analysis and determined that the patient's presenting problems are consistent with the use of telepsychology. Determined that the patient has sufficient knowledge and skills in the use of technology enabling them to adequately benefit from telepsychology. It was determined that this patient was able to be properly treated without an in-person session. Patient verified that they were currently located at the [facility] address that was provided during registration or another [facility] address, if noted below. Verified the following information:  Patient's identification: Yes  Patient location: [place] [id number]   Patient's call back number: [phone]   Patient's emergency contact's name and number, as well as permission to contact them if needed: Extended Emergency Contact Information  Primary Emergency Contact: Cherie Knight  Address: [address] [place] [address] Phone: [phone]  Relation: Parent     Provider location: Olman Montaño, Ph.D.  Psychologist  9/29/2021  2:13 PM      Time spent with Patient: 25 minutes  This is patient's 28th  [facility] appointment.     Reason for Consult:    Chief Complaint   Patient presents with   Debera  Depression Feedback given to PCP. S:  Pt seen for f/u of depression and anxiety. Pt reported \"alright\" mood and sxs. Continues to work for [employer] bc they will ask for small concessions that have added up to a lot of responsibilities and time she didn't want to give. Has updated all materials, but feeling overwhelmed by job postings. Disclosed complicated feelings for her manager. O:  MSE:    Appearance    alert, cooperative  Appetite normal  Sleep disturbance No  Fatigue Yes  Loss of pleasure No  Impulsive behavior No  Speech    spontaneous, normal rate, normal volume and well articulated  Mood    Depressed  Affect    depressed affect  Thought Content    intact  Thought Process    goal directed and coherent  Associations    logical connections  Insight    Fair  Judgment    Intact  Orientation    oriented to person, place, time, and general circumstances  Memory    recent and remote memory intact  Attention/Concentration    impaired  Morbid ideation No  Suicide Assessment    no suicidal ideation  History:  Social History:   Social History     Socioeconomic History    Marital status: Single     Spouse name: Not on file    Number of children: Not on file    Years of education: Not on file    Highest education level: Not on file   Occupational History    Occupation:  grad in fine arts     Comment: [employer] ([employer])    Occupation:  at [employer]. Tobacco Use    Smoking status: Never Smoker    Smokeless tobacco: Never Used   Vaping Use    Vaping Use: Never used   Substance and Sexual Activity    Alcohol use: Yes     Comment: 1 mikes hard lemonade in 1 month    Drug use: Never    Sexual activity: Not Currently   Other Topics Concern    Not on file   Social History Narrative    Works at [employer]. Lives with her friend. 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 For fun, she enjoys gardening.       Social Determinants of Health     Financial Resource Strain: Low Risk    

## 2021-10-28 ENCOUNTER — VIRTUAL VISIT (OUTPATIENT)
Dept: PSYCHOLOGY | Age: 26
End: 2021-10-28
Payer: MEDICAID

## 2021-10-28 DIAGNOSIS — F33.1 MODERATE EPISODE OF RECURRENT MAJOR DEPRESSIVE DISORDER (HCC): Primary | ICD-10-CM

## 2021-10-28 DIAGNOSIS — F41.1 GAD (GENERALIZED ANXIETY DISORDER): ICD-10-CM

## 2021-10-28 PROCEDURE — 90832 PSYTX W PT 30 MINUTES: CPT | Performed by: PSYCHOLOGIST

## 2021-10-28 NOTE — PROGRESS NOTES
TELEHEALTH VISIT -- Audio/Visual (During GKYFZ-46 public health emergency)  }  Pursuant to the emergency declaration under the [address] [place] [address] [facility] authority and the [employer] Act, this Virtual Visit was conducted, with patient's consent, to reduce the patient's risk of exposure to COVID-19 and provide continuity of care for an established patient. Services were provided through a video synchronous discussion virtually to substitute for in-person clinic visit. Pt gave verbal informed consent to participate in telehealth services. Conducted a risk-benefit analysis and determined that the patient's presenting problems are consistent with the use of telepsychology. Determined that the patient has sufficient knowledge and skills in the use of technology enabling them to adequately benefit from telepsychology. It was determined that this patient was able to be properly treated without an in-person session. Patient verified that they were currently located at the [address] [place] [address] Dr address that was provided during registration or another [address] [place] [address] address, if noted below. Verified the following information:  Patient's identification: Yes  [facility] [place] [id number]   Patient's call back number: [phone]   Patient's emergency contact's name and number, as well as permission to contact them if needed: Extended Emergency Contact Information  Primary Emergency Contact: Laura Mujica  Address: [address] [place] [address] Phone: [phone]  Relation: Parent     Provider location: [facility]  Anoop Duong, Ph.D.  Psychologist  10/28/2021  11:13 AM      Time spent with Patient: 30 minutes  This is patient's 29th  [facility] appointment.     Reason for Consult:    Chief Complaint   Patient presents with   Theodoro Milder Depression Feedback given to PCP. S:  Pt seen for f/u of depression and anxiety. Pt reported unchanged mood and sxs. Continuing to work long hours, but has applied to 10-12 places (social media management, photography,  job, technical drafting). Has received 3 denials, \"it hasn't gotten crushing yet. \" praised progress and discussed cognitions about needing to find something she loves vs finding something that is good enough and allows her to pursue passions during her free time. O:  MSE:  Appearance    alert, cooperative  Appetite normal  Sleep disturbance No  Fatigue Yes  Loss of pleasure No  Impulsive behavior No  Speech    spontaneous, normal rate, normal volume and well articulated  Mood    Depressed  Affect    depressed affect  Thought Content    intact  Thought Process    goal directed and coherent  Associations    logical connections  Insight    Fair  Judgment    Intact  Orientation    oriented to person, place, time, and general circumstances  Memory    recent and remote memory intact  Attention/Concentration    impaired  Morbid ideation No  Suicide Assessment    no suicidal ideation    History:  Social History:   Social History     Socioeconomic History    Marital status: Single     Spouse name: Not on file    Number of children: Not on file    Years of education: Not on file    Highest education level: Not on file   Occupational History    Occupation:  grad in fine arts     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 Tobacco Use    Smoking status: Never Smoker    Smokeless tobacco: Never Used   Vaping Use    Vaping Use: Never used   Substance and Sexual Activity    Alcohol use: Yes     Comment: 1 mikes hard lemonade in 1 month    Drug use: Never    Sexual activity: Not Currently   Other Topics Concern    Not on file   Social History Narrative    Works at [employer]. Lives with her friend. 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  For fun, she enjoys gardening. Social Determinants of Health     Financial Resource Strain: Low Risk     Difficulty of Paying Living Expenses: Not hard at all   Food Insecurity: No Food Insecurity    Worried About Running Out of Food in the Last Year: Never true    Batsheva of Food in the Last Year: Never true   Transportation Needs:     Lack of Transportation (Medical):  Lack of Transportation (Non-Medical):    Physical Activity:     Days of Exercise per Week:     Minutes of Exercise per Session:    Stress:     Feeling of Stress :    Social Connections:     Frequency of Communication with Friends and Family:     Frequency of Social Gatherings with Friends and Family:     Attends [employer] Services:     Active Member of Clubs or Organizations:     Attends Club or Organization Meetings:     Marital Status:    Intimate Partner Violence:     Fear of Current or Ex-Partner:     Emotionally Abused:     Physically Abused:     Sexually Abused:      TOBACCO:   reports that she has never smoked. She has never used smokeless tobacco.  ETOH:   reports current alcohol use. Diagnosis:  1. Moderate episode of recurrent major depressive disorder ([facility] [address]    2. BRENTON (generalized anxiety disorder)          Plan:  Pt interventions:  Discussed and problem-solved barriers in adhering to behavioral change plan, Motivational Interviewing to increase patient confidence and compliance with adhering to behavioral change plan and Identified maladaptive thoughts        Documentation was done using voice recognition dragon software. Every effort was made to ensure accuracy; however, inadvertent, unintentional computerized transcription errors may be present.

## 2021-11-02 ENCOUNTER — OFFICE VISIT (OUTPATIENT)
Dept: INTERNAL MEDICINE CLINIC | Age: 26
End: 2021-11-02
Payer: MEDICAID

## 2021-11-02 VITALS
DIASTOLIC BLOOD PRESSURE: 74 MMHG | OXYGEN SATURATION: 99 % | HEART RATE: 76 BPM | SYSTOLIC BLOOD PRESSURE: 122 MMHG | WEIGHT: 250.6 LBS | BODY MASS INDEX: 43.02 KG/M2

## 2021-11-02 DIAGNOSIS — J30.89 SEASONAL ALLERGIC RHINITIS DUE TO OTHER ALLERGIC TRIGGER: ICD-10-CM

## 2021-11-02 DIAGNOSIS — F41.9 ANXIETY AND DEPRESSION: Primary | ICD-10-CM

## 2021-11-02 DIAGNOSIS — E66.01 CLASS 3 SEVERE OBESITY DUE TO EXCESS CALORIES WITHOUT SERIOUS COMORBIDITY WITH BODY MASS INDEX (BMI) OF 40.0 TO 44.9 IN ADULT (HCC): ICD-10-CM

## 2021-11-02 DIAGNOSIS — R06.83 SNORING: ICD-10-CM

## 2021-11-02 DIAGNOSIS — Z13.31 POSITIVE DEPRESSION SCREENING: ICD-10-CM

## 2021-11-02 DIAGNOSIS — F32.A ANXIETY AND DEPRESSION: Primary | ICD-10-CM

## 2021-11-02 PROCEDURE — G8427 DOCREV CUR MEDS BY ELIG CLIN: HCPCS | Performed by: NURSE PRACTITIONER

## 2021-11-02 PROCEDURE — 1036F TOBACCO NON-USER: CPT | Performed by: NURSE PRACTITIONER

## 2021-11-02 PROCEDURE — G8417 CALC BMI ABV UP PARAM F/U: HCPCS | Performed by: NURSE PRACTITIONER

## 2021-11-02 PROCEDURE — 99214 OFFICE O/P EST MOD 30 MIN: CPT | Performed by: NURSE PRACTITIONER

## 2021-11-02 PROCEDURE — G8484 FLU IMMUNIZE NO ADMIN: HCPCS | Performed by: NURSE PRACTITIONER

## 2021-11-02 RX ORDER — BUPROPION HYDROCHLORIDE 300 MG/1
300 TABLET ORAL EVERY MORNING
Qty: 90 TABLET | Refills: 0 | Status: SHIPPED | OUTPATIENT
Start: 2021-11-02 | End: 2022-02-22 | Stop reason: SDUPTHER

## 2021-11-02 RX ORDER — SERTRALINE HYDROCHLORIDE 100 MG/1
200 TABLET, FILM COATED ORAL DAILY
Qty: 180 TABLET | Refills: 0 | Status: SHIPPED | OUTPATIENT
Start: 2021-11-02 | End: 2022-02-22 | Stop reason: SDUPTHER

## 2021-11-02 ASSESSMENT — ENCOUNTER SYMPTOMS
WHEEZING: 0
ABDOMINAL PAIN: 0
VOMITING: 0
NAUSEA: 0
DIARRHEA: 0
SHORTNESS OF BREATH: 0
COUGH: 0
CONSTIPATION: 0

## 2021-11-02 ASSESSMENT — PATIENT HEALTH QUESTIONNAIRE - PHQ9
5. POOR APPETITE OR OVEREATING: 1
4. FEELING TIRED OR HAVING LITTLE ENERGY: 3
9. THOUGHTS THAT YOU WOULD BE BETTER OFF DEAD, OR OF HURTING YOURSELF: 0
SUM OF ALL RESPONSES TO PHQ QUESTIONS 1-9: 10
3. TROUBLE FALLING OR STAYING ASLEEP: 1
7. TROUBLE CONCENTRATING ON THINGS, SUCH AS READING THE NEWSPAPER OR WATCHING TELEVISION: 1
1. LITTLE INTEREST OR PLEASURE IN DOING THINGS: 1
SUM OF ALL RESPONSES TO PHQ QUESTIONS 1-9: 10
10. IF YOU CHECKED OFF ANY PROBLEMS, HOW DIFFICULT HAVE THESE PROBLEMS MADE IT FOR YOU TO DO YOUR WORK, TAKE CARE OF THINGS AT HOME, OR GET ALONG WITH OTHER PEOPLE: 1
6. FEELING BAD ABOUT YOURSELF - OR THAT YOU ARE A FAILURE OR HAVE LET YOURSELF OR YOUR FAMILY DOWN: 2
8. MOVING OR SPEAKING SO SLOWLY THAT OTHER PEOPLE COULD HAVE NOTICED. OR THE OPPOSITE, BEING SO FIGETY OR RESTLESS THAT YOU HAVE BEEN MOVING AROUND A LOT MORE THAN USUAL: 0
SUM OF ALL RESPONSES TO PHQ9 QUESTIONS 1 & 2: 2
SUM OF ALL RESPONSES TO PHQ QUESTIONS 1-9: 10
2. FEELING DOWN, DEPRESSED OR HOPELESS: 1

## 2021-11-02 ASSESSMENT — COLUMBIA-SUICIDE SEVERITY RATING SCALE - C-SSRS
2. HAVE YOU ACTUALLY HAD ANY THOUGHTS OF KILLING YOURSELF?: NO
1. WITHIN THE PAST MONTH, HAVE YOU WISHED YOU WERE DEAD OR WISHED YOU COULD GO TO SLEEP AND NOT WAKE UP?: YES
6. HAVE YOU EVER DONE ANYTHING, STARTED TO DO ANYTHING, OR PREPARED TO DO ANYTHING TO END YOUR LIFE?: NO

## 2021-11-02 NOTE — PROGRESS NOTES
Office Visit   11/2/2021    Subjective:  Chief Complaint   Patient presents with    1 Month Follow-Up     mood, says her mood has been alright, says she did run out of her wellbutrin a week ago     HPI:   Travis Gresham is a 23 y.o. female who presents to the clinic today for follow up. Depression- Taking Zoloft 200 mg PO daily and last visit, Wellbutrin 300 mg daily was started. States her mood was doing better on this regimen. Denies side effects. She did run out of the wellbutrin for a few days and states she noticed worsening symptoms and would like to restart. Denies anxiety attacks. Denies SI/HI.  Seeing psychology and this is going well. Snoring- Referred to sleep medicine. scheduled.     Allergic rhinitis- taking zyrtec PRN.  Well controlled at this time.     Obesity- not exercising d/t work being busy. Trying to increase fruits and vegetables in the diet. Review of Systems   Constitutional: Negative for chills, fatigue, fever and unexpected weight change. Eyes: Negative for visual disturbance. Respiratory: Negative for cough, shortness of breath and wheezing. Cardiovascular: Negative for chest pain, palpitations and leg swelling. Gastrointestinal: Negative for abdominal pain, constipation, diarrhea, nausea and vomiting. Skin: Negative for pallor and rash. Neurological: Negative for dizziness, weakness, light-headedness, numbness and headaches. Psychiatric/Behavioral: Positive for dysphoric mood. Negative for self-injury, sleep disturbance and suicidal ideas. The patient is not nervous/anxious.       Allergies   Allergen Reactions    Ceftin [Cefuroxime] Rash     Current Outpatient Rx   Medication Sig Dispense Refill    buPROPion (WELLBUTRIN XL) 300 MG extended release tablet Take 1 tablet by mouth every morning 90 tablet 0    sertraline (ZOLOFT) 100 MG tablet Take 2 tablets by mouth daily 180 tablet 0    norethindrone-ethinyl estradiol-iron (MICROGESTIN FE1.5/30) 1.5-30 MG-MCG tablet Take 1 tablet by mouth daily      clobetasol (TEMOVATE) 0.05 % cream Apply thin layer once or twice daily to eczema prone skin. Try to break for a full 1 week after 3 weeks on the same area. 60 g 0    cetirizine (ZYRTEC) 10 MG tablet Take 10 mg by mouth daily       Patient Active Problem List   Diagnosis    Allergic rhinitis due to allergen    Anxiety and depression    Moderate episode of recurrent major depressive disorder ([address]    BRENTON (generalized anxiety disorder)    Morbidly obese (HCC)    Radial styloid tenosynovitis    Snoring      Wt Readings from Last 3 Encounters:   11/02/21 250 lb 9.6 oz (113.7 kg)   09/21/21 246 lb 12.8 oz (111.9 kg)   08/18/21 245 lb 3.2 oz (111.2 kg)     BP Readings from Last 3 Encounters:   11/02/21 122/74   09/21/21 118/80   08/18/21 120/70     The ASCVD Risk score (Tyler Councilman., et al., 2013) failed to calculate for the following reasons: The 2013 ASCVD risk score is only valid for ages 36 to 78    PHQ-9 Total Score: 10 (11/2/2021 11:10 AM)  Thoughts that you would be better off dead, or of hurting yourself in some way: 0 (11/2/2021 11:10 AM)    Objective/Physical Exam:  /74   Pulse 76   Wt 250 lb 9.6 oz (113.7 kg)   SpO2 99%   BMI 43.02 kg/m²   Body mass index is 43.02 kg/m². Physical Exam  Vitals reviewed. Constitutional:       General: She is not in acute distress. Appearance: She is well-developed. She is not diaphoretic. HENT:      Head: Normocephalic and atraumatic. Eyes:      Pupils: Pupils are equal, round, and reactive to light. Cardiovascular:      Rate and Rhythm: Normal rate and regular rhythm. Pulmonary:      Effort: Pulmonary effort is normal. No respiratory distress. Breath sounds: Normal breath sounds. No wheezing or rales. Chest:      Chest wall: No tenderness. Abdominal:      General: Bowel sounds are normal.      Palpations: Abdomen is soft. Skin:     General: Skin is warm and dry.       Coloration: Skin is not pale.      Findings: No erythema or rash. Neurological:      Mental Status: She is alert and oriented to person, place, and time. Coordination: Coordination normal.   Psychiatric:         Mood and Affect: Mood normal.       Assessment and Plan:  Don Zarate was seen today for 1 month follow-up. Diagnoses and all orders for this visit:    Anxiety and depression/Positive depression screening  -     last phq9 was 7. Today's PHQ9 is 10.  - Patient reports she has been out of her medications for the last few days. She states her mood was doing better on the medications. She denies side effects. She would like to continue on the current regimen.  - Reports passive thoughts that she would be better off not waking up. Denies SI/HI. Denies plan or intent. - Continue with Dr. Didi Dumont, psychology. - buPROPion (WELLBUTRIN XL) 300 MG extended release tablet; Take 1 tablet by mouth every morning  -     sertraline (ZOLOFT) 100 MG tablet; Take 2 tablets by mouth daily    Snoring   - Has appointment with sleep medicine    Seasonal allergic rhinitis due to other allergic trigger   - Well-controlled without side effects. Continue with over-the-counter regimen    Class 3 severe obesity due to excess calories without serious comorbidity with body mass index (BMI) of 40.0 to 44.9 in adult [facility])   - Lifestyle modifications such as exercise, weight loss and healthy diet encouraged and reviewed with the pt. Return in about 3 months (around 2/2/2022) for mood f/u, or sooner if needed. Pt will call if symptoms worsen or fail to improve. All questions answered. Pt states no further questions or concerns at this time.    Electronically signed by: MARS Tapia CNP 11/02/21

## 2021-11-29 ENCOUNTER — VIRTUAL VISIT (OUTPATIENT)
Dept: PSYCHOLOGY | Age: 26
End: 2021-11-29
Payer: MEDICAID

## 2021-11-29 DIAGNOSIS — F33.1 MODERATE EPISODE OF RECURRENT MAJOR DEPRESSIVE DISORDER (HCC): Primary | ICD-10-CM

## 2021-11-29 DIAGNOSIS — F41.1 GAD (GENERALIZED ANXIETY DISORDER): ICD-10-CM

## 2021-11-29 PROCEDURE — 90832 PSYTX W PT 30 MINUTES: CPT | Performed by: PSYCHOLOGIST

## 2021-11-29 NOTE — PROGRESS NOTES
TELEHEALTH VISIT -- Audio/Visual (During FFYPO-90 public health emergency)  }  Pursuant to the emergency declaration under the [facility] authority and the [employer] Act, this Virtual Visit was conducted, with patient's consent, to reduce the patient's risk of exposure to COVID-19 and provide continuity of care for an established patient. Services were provided through a video synchronous discussion virtually to substitute for in-person clinic visit. Pt gave verbal informed consent to participate in telehealth services. Conducted a risk-benefit analysis and determined that the patient's presenting problems are consistent with the use of telepsychology. Determined that the patient has sufficient knowledge and skills in the use of technology enabling them to adequately benefit from telepsychology. It was determined that this patient was able to be properly treated without an in-person session. Patient verified that they were currently located at the [facility] address that was provided during registration or another [facility] address, if noted below. Verified the following information:  Patient's identification: Yes  Patient location: [place] [id number]   Patient's call back number: [phone]   Patient's emergency contact's name and number, as well as permission to contact them if needed: Extended Emergency Contact Information  Primary Emergency Contact: Bon Euceda  Address: [address] [place] [address] Phone: [phone]  Relation: Parent     Provider location: [facility]  Tariq Skinner, Ph.D.  Psychologist  11/29/2021  1:02 PM      Time spent with Patient: 28 minutes  This is patient's 30th  [facility] appointment.     Reason for Consult:    Chief Complaint   Patient presents with   Kyleigh Hanane Depression Feedback given to PCP. S:  Pt seen for f/u of depression and anxiety. Pt reported stable mood and sxs. Continues to work long hours under stressful circumstances. Reviewed meaningful boundary setting w work, self, and others. Discussed cognitions r/t needing to say yes to requests of her in order to continues to keep people happy w her. O:  MSE:  Appearance    alert, cooperative  Appetite normal  Sleep disturbance No  Fatigue Yes  Loss of pleasure No  Impulsive behavior No  Speech    spontaneous, normal rate, normal volume and well articulated  Mood    Depressed  Affect    depressed affect  Thought Content    intact  Thought Process    goal directed and coherent  Associations    logical connections  Insight    Fair  Judgment    Intact  Orientation    oriented to person, place, time, and general circumstances  Memory    recent and remote memory intact  Attention/Concentration    impaired  Morbid ideation No  Suicide Assessment    no suicidal ideation    History:  Social History:   Social History     Socioeconomic History    Marital status: Single     Spouse name: Not on file    Number of children: Not on file    Years of education: Not on file    Highest education level: Not on file   Occupational History    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 Tobacco Use    Smoking status: Never Smoker    Smokeless tobacco: Never Used   Vaping Use    Vaping Use: Never used   Substance and Sexual Activity    Alcohol use: Yes     Comment: 1 mikes hard lemonade in 1 month    Drug use: Never    Sexual activity: Not Currently   Other Topics Concern    Not on file   Social History Narrative    Works at [employer]. Lives with her friend. 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 For fun, she enjoys gardening.       Social Determinants of Health     Financial Resource Strain: Low Risk     Difficulty of Paying Living Expenses: Not hard at all   Food Insecurity: No Food Insecurity    Worried About [address] in the Last Year: Never true    Batsheva of Food in the Last Year: Never true   Transportation Needs:     Lack of Transportation (Medical): Not on file    Lack of Transportation (Non-Medical): Not on file   Physical Activity:     Days of Exercise per Week: Not on file    Minutes of Exercise per Session: Not on file   Stress:     Feeling of Stress : Not on file   Social Connections:     Frequency of Communication with Friends and Family: Not on file    Frequency of Social Gatherings with Friends and Family: Not on file    Attends Protestant Services: Not on file    Active Member of [employer] or Organizations: Not on file    Attends Club or Organization Meetings: Not on file    Marital Status: Not on file   Intimate Partner Violence:     Fear of Current or Ex-Partner: Not on file    Emotionally Abused: Not on file    Physically Abused: Not on file    Sexually Abused: Not on file   Housing Stability:     Unable to Pay for Housing in the Last Year: Not on file    Number of [place] in the Last Year: Not on file    Unstable Housing in the Last Year: Not on file     TOBACCO:   reports that she has never smoked. She has never used smokeless tobacco.  ETOH:   reports current alcohol use. Diagnosis:  1. Moderate episode of recurrent major depressive disorder ([facility] [address]    2. BRENTON (generalized anxiety disorder)          Plan:  Pt interventions:  Supportive techniques, Provided Psychoeducation re: maladaptive thoughts and Identified maladaptive thoughts        Documentation was done using voice recognition dragon software. Every effort was made to ensure accuracy; however, inadvertent, unintentional computerized transcription errors may be present.

## 2021-12-01 ENCOUNTER — OFFICE VISIT (OUTPATIENT)
Dept: PULMONOLOGY | Age: 26
End: 2021-12-01
Payer: MEDICAID

## 2021-12-01 VITALS
OXYGEN SATURATION: 98 % | SYSTOLIC BLOOD PRESSURE: 122 MMHG | DIASTOLIC BLOOD PRESSURE: 76 MMHG | BODY MASS INDEX: 46.01 KG/M2 | HEART RATE: 75 BPM | HEIGHT: 62 IN | WEIGHT: 250 LBS

## 2021-12-01 DIAGNOSIS — E66.01 MORBIDLY OBESE (HCC): Chronic | ICD-10-CM

## 2021-12-01 DIAGNOSIS — R06.83 SNORING: ICD-10-CM

## 2021-12-01 DIAGNOSIS — F33.1 MODERATE EPISODE OF RECURRENT MAJOR DEPRESSIVE DISORDER (HCC): Chronic | ICD-10-CM

## 2021-12-01 DIAGNOSIS — G47.10 HYPERSOMNIA: Primary | ICD-10-CM

## 2021-12-01 DIAGNOSIS — F41.1 GAD (GENERALIZED ANXIETY DISORDER): Chronic | ICD-10-CM

## 2021-12-01 PROBLEM — F41.9 ANXIETY AND DEPRESSION: Chronic | Status: ACTIVE | Noted: 2019-06-21

## 2021-12-01 PROBLEM — J30.9 ALLERGIC RHINITIS DUE TO ALLERGEN: Chronic | Status: ACTIVE | Noted: 2019-06-21

## 2021-12-01 PROBLEM — F32.A ANXIETY AND DEPRESSION: Chronic | Status: ACTIVE | Noted: 2019-06-21

## 2021-12-01 PROCEDURE — G8484 FLU IMMUNIZE NO ADMIN: HCPCS | Performed by: INTERNAL MEDICINE

## 2021-12-01 PROCEDURE — 99244 OFF/OP CNSLTJ NEW/EST MOD 40: CPT | Performed by: INTERNAL MEDICINE

## 2021-12-01 PROCEDURE — G8417 CALC BMI ABV UP PARAM F/U: HCPCS | Performed by: INTERNAL MEDICINE

## 2021-12-01 PROCEDURE — G8427 DOCREV CUR MEDS BY ELIG CLIN: HCPCS | Performed by: INTERNAL MEDICINE

## 2021-12-01 ASSESSMENT — SLEEP AND FATIGUE QUESTIONNAIRES
HOW LIKELY ARE YOU TO NOD OFF OR FALL ASLEEP WHEN YOU ARE A PASSENGER IN A CAR FOR AN HOUR WITHOUT A BREAK: 1
HOW LIKELY ARE YOU TO NOD OFF OR FALL ASLEEP WHILE SITTING AND TALKING TO SOMEONE: 0
HOW LIKELY ARE YOU TO NOD OFF OR FALL ASLEEP WHILE SITTING INACTIVE IN A PUBLIC PLACE: 1
HOW LIKELY ARE YOU TO NOD OFF OR FALL ASLEEP WHILE SITTING QUIETLY AFTER LUNCH WITHOUT ALCOHOL: 0
ESS TOTAL SCORE: 8
NECK CIRCUMFERENCE (INCHES): 14
HOW LIKELY ARE YOU TO NOD OFF OR FALL ASLEEP WHILE LYING DOWN TO REST IN THE AFTERNOON WHEN CIRCUMSTANCES PERMIT: 3
HOW LIKELY ARE YOU TO NOD OFF OR FALL ASLEEP WHILE WATCHING TV: 2
HOW LIKELY ARE YOU TO NOD OFF OR FALL ASLEEP IN A CAR, WHILE STOPPED FOR A FEW MINUTES IN TRAFFIC: 0
HOW LIKELY ARE YOU TO NOD OFF OR FALL ASLEEP WHILE SITTING AND READING: 1

## 2021-12-01 ASSESSMENT — ENCOUNTER SYMPTOMS
CHEST TIGHTNESS: 0
SHORTNESS OF BREATH: 0
APNEA: 0
VOMITING: 0
NAUSEA: 0
CHOKING: 0
PHOTOPHOBIA: 0
EYE PAIN: 0
ALLERGIC/IMMUNOLOGIC NEGATIVE: 1
ABDOMINAL PAIN: 0
RHINORRHEA: 0
ABDOMINAL DISTENTION: 0

## 2021-12-01 NOTE — PROGRESS NOTES
Genny Vang MD, Kaylee Fisher, [facility]  Tiffanie Kehrt CNP Bartley Imam CNP [address]  Patricia Alamo [address] [facility], [address] [facility] [phone]   [facility] [address] Patricia Alamo. [address] [place] [address]. Farhat Almodovar 37 [phone]     [place] [address]  Dept: [phone]  Loc: [phone]    Assessment:      Visit Diagnoses and Associated Orders     Hypersomnia   (New Problem)  -  Primary    needs work-up    Home Sleep Study (HST) [69354 Custom]   - Future Order         Snoring   (New Problem)      needs work-up    Home Sleep Study (HST) [[id number] Custom]   - Future Order         BRENTON (generalized anxiety disorder)   (Stable)           Moderate episode of recurrent major depressive disorder (HCC)   (Stable)           Morbidly obese (HCC)   (Not Stable)                  Plan:      One or more undiagnosed new problem with uncertain prognosis till final diagnosis is made. Differential diagnosis includes but not limited to: ARAM, PLMD's, narcolepsy, parasomnias. Reviewed ARAM (highest likelihood Dx): pathophysiology, diagnosis, complications and treatment. Instructed her not to drive if drowsy. Continue medications per her PCP and other physicians. Limit caffeine use after 3pm. Standard of care is to do in-lab PSG but insurance is mandating an inferior HST. 1 wk follow up after study to review her results. The chronic medical conditions listed are directly related to the primary diagnosis listed above. The management of the primary diagnosis affects the secondary diagnosis and vice versa. This information was analyzed to assess complexity and medical decision making in regards to further testing and management. Continue meds for: BRENTON and depression. Pt would medically benefit from wt loss for ARAM (diet, exercise, surgical).     Orders Placed This Encounter   Procedures    Home Sleep Study (HST)          Subjective:     Patient ID: Sabina Larry Joanne Mckeon is a 23 y.o. female. Chief Complaint   Patient presents with    Daytime Sleepiness     Trouble falling asleep and staying asleep. HPI:      Marielos Lugo is a 23 y.o. female referred by Dustin Mohamud for a sleep evaluation. She complains of: snoring, excessive daytime sleepiness , non-restorative sleep, napping, decreased concentration, short term memory issues and tossing and turning at night. She denies: cataplexy and hypnagogic hallucinations. Symptoms began several years ago, gradually worsening since that time. Previous evaluation and treatment has included- none    Chronic stable medical conditions: depression and BRENTON  Chronic unstable medical conditions: obesity    DOT/CDL - No  FAA/'s license -No    Previous Report(s) Reviewed: historical medical records, office notes, andreferral letter(s). Pertinent data has been documented. 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 Tobacco Use    Smoking status: Never Smoker    Smokeless tobacco: Never Used   Vaping Use    Vaping Use: Never used   Substance and Sexual Activity    Alcohol use: Yes     Comment: 1 mikes hard lemonade in 1 month    Drug use: Never    Sexual activity: Not Currently   Other Topics Concern    Not on file   Social History Narrative    Works at [employer]. Lives with her friend. 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 For fun, she enjoys gardening.       Social Determinants of Health     Financial Resource Strain: Low Risk     Difficulty of Paying Living Expenses: Not hard at all   Food Insecurity: No Food Insecurity    Worried About Running Out of Food in the Last Year: Never true    Ran Out of Food in the Last Year: Never true   Transportation Needs:     Lack of Transportation (Medical): Not on file    Lack of Transportation (Non-Medical): Not on file   Physical Activity:     Days of Exercise per Week: Not on file    Minutes of Exercise per Session: Not on file   Stress:     Feeling of Stress : Not on file   Social Connections:     Frequency of Communication with Friends and Family: Not on file    Frequency of Social Gatherings with Friends and Family: Not on file    Attends Buddhism Services: Not on file    Active Member of [address] [place] [address] [employer] or Organizations: Not on file    Attends Club or Organization Meetings: Not on file    Marital Status: Not on file   Intimate Partner Violence:     Fear of Current or Ex-Partner: Not on file    Emotionally Abused: Not on file    Physically Abused: Not on file    Sexually Abused: Not on file   Housing Stability:     Unable to Pay for Housing in the Last Year: Not on file    Number of [place] in the Last Year: Not on file    Unstable Housing in the Last Year: Not on file        Current Outpatient Medications   Medication Instructions    buPROPion (WELLBUTRIN XL) 300 mg, Oral, EVERY MORNING    cetirizine (ZYRTEC) 10 mg, Oral, DAILY    clobetasol (TEMOVATE) 0.05 % cream Apply thin layer once or twice daily to eczema prone skin. Try to break for a full 1 week after 3 weeks on the same area.     norethindrone-ethinyl estradiol-iron (MICROGESTIN FE1.5/30) 1.5-30 MG-MCG tablet 1 tablet, Oral, DAILY    sertraline (ZOLOFT) 200 mg, Oral, DAILY       Allergies as of 12/01/2021 - Fully Reviewed 12/01/2021   Allergen Reaction Noted    Ceftin [cefuroxime] Rash 08/16/2014       Patient Active Problem List   Diagnosis    Allergic rhinitis due to allergen    Anxiety and depression    Moderate episode of recurrent major depressive disorder ([facility] [address]    BRENTON (generalized anxiety disorder)    Morbidly obese (HCC)    Radial styloid tenosynovitis    Snoring       Past Medical History:   Diagnosis Date    Chronic eczema     Depression     Seasonal allergies     Snoring        Past Surgical History:   Procedure Laterality Date    TONSILLECTOMY      when she was 12       Family History   Problem Relation Age of Onset    No Known Problems Mother     Hypertension Father     Other Father         Romero's esophagus    Lung Cancer Maternal Grandmother     Diabetes Paternal Grandmother     Cancer Paternal Grandfather     Breast Cancer Neg Hx     Ovarian Cancer Neg Hx     Heart Attack Neg Hx     Stroke Neg Hx        Review of Systems   Constitutional: Positive for fatigue. Negative for activity change and appetite change. HENT: Positive for congestion. Negative for nosebleeds, postnasal drip, rhinorrhea and sneezing. Eyes: Negative for photophobia, pain and visual disturbance. Respiratory: Negative for apnea, choking, chest tightness and shortness of breath. Cardiovascular: Negative. Gastrointestinal: Negative for abdominal distention, abdominal pain, nausea and vomiting. Endocrine: Negative for cold intolerance and heat intolerance. Genitourinary: Negative for difficulty urinating, dysuria, frequency and urgency. Musculoskeletal: Negative. Negative for neck pain and neck stiffness. Skin: Negative. Allergic/Immunologic: Negative. Neurological: Negative for tremors, seizures, syncope and weakness. Hematological: Negative for adenopathy. Does not bruise/bleed easily. Psychiatric/Behavioral: Positive for decreased concentration, dysphoric mood and sleep disturbance. Negative for agitation, behavioral problems and confusion. The patient is nervous/anxious. Objective:     Vitals:  Weight BMI   Wt Readings from Last 3 Encounters:   12/01/21 250 lb (113.4 kg)   11/02/21 250 lb 9.6 oz (113.7 kg)   09/21/21 246 lb 12.8 oz (111.9 kg)    Body mass index is 45.73 kg/m².      BP HR Mood and affect normal.         Speech: Speech normal.         Behavior: Behavior normal. Behavior is cooperative. Thought Content:  Thought content normal.         Electronically signed by Emanuel Dolan MD on12/1/2021 at 11:55 AM

## 2021-12-01 NOTE — LETTER
[facility]  [place] [address] [address] [facility] [address]  Phone: [phone]  Fax: [phone]           Kyleigh Castañeda MD      December 1, 2021     Patient: Anna Guy   MR Number: [id number]   YOB: 1995   Date of Visit: 12/1/2021       Dear Dr. gAuiar American: Thank you for referring Anna Guy to me for evaluation/treatment. Below are the relevant portions of my assessment and plan of care. Visit Diagnoses and Associated Orders     Hypersomnia   (New Problem)  -  Primary    needs work-up    Home Sleep Study (HST) [69325 Custom]   - Future Order         Snoring   (New Problem)      needs work-up    Home Sleep Study (HST) [24297 Custom]   - Future Order         BRENTON (generalized anxiety disorder)   (Stable)           Moderate episode of recurrent major depressive disorder (HCC)   (Stable)           Morbidly obese (HCC)   (Not Stable)                 One or more undiagnosed new problem with uncertain prognosis till final diagnosis is made. Differential diagnosis includes but not limited to: ARAM, PLMD's, narcolepsy, parasomnias. Reviewed ARAM (highest likelihood Dx): pathophysiology, diagnosis, complications and treatment. Instructed her not to drive if drowsy. Continue medications per her PCP and other physicians. Limit caffeine use after 3pm. Standard of care is to do in-lab PSG but insurance is mandating an inferior HST. 1 wk follow up after study to review her results. The chronic medical conditions listed are directly related to the primary diagnosis listed above. The management of the primary diagnosis affects the secondary diagnosis and vice versa. This information was analyzed to assess complexity and medical decision making in regards to further testing and management. Continue meds for: BRENTON and depression. Pt would medically benefit from wt loss for ARAM (diet, exercise, surgical).     Orders Placed This Encounter   Procedures    Home Sleep Study (HST) If you have questions, please do not hesitate to call me. I look forward to following Shaun Tano along with you.     Sincerely,        Kym Cortés MD    CC providers:  Georgia Rand, APRN - CNP  [address] [address]  [place]

## 2022-01-04 ENCOUNTER — HOSPITAL ENCOUNTER (OUTPATIENT)
Dept: SLEEP CENTER | Age: 27
Discharge: HOME | End: 2022-01-04
Payer: MEDICAID

## 2022-01-04 DIAGNOSIS — R06.83 SNORING: ICD-10-CM

## 2022-01-04 DIAGNOSIS — G47.10 HYPERSOMNIA: ICD-10-CM

## 2022-01-04 PROCEDURE — 95806 SLEEP STUDY UNATT&RESP EFFT: CPT

## 2022-01-09 PROCEDURE — 95806 SLEEP STUDY UNATT&RESP EFFT: CPT | Performed by: INTERNAL MEDICINE

## 2022-01-12 ENCOUNTER — TELEPHONE (OUTPATIENT)
Dept: PULMONOLOGY | Age: 27
End: 2022-01-12

## 2022-01-12 NOTE — PROGRESS NOTES
Rajiv Park         : 1995 [address] [facility]    Diagnosis: [x] ARAM (G47.33) [] CSA (G47.31) [] Apnea (G47.30)   Length of Need: [x] 12 Months [] 99 Months [] Other:    Machine (LETTY!): [] Respironics Dream Station   2   Auto [x] ResMed AirSense     Auto S11 [] Other:     [x]  CPAP () [] Bilevel ()   Mode: [x] Auto [] Spontaneous    Mode: [] Auto [] Spontaneous      P min 7 cmH2O  P max 17 cmH2O      Comfort Settings:   - Ramp Pressure: 4 cmH2O                                        - Ramp time: 15 min                                     -  Flex/EPR - 3 full time                                    - For ResMed Bilevel (TiMax-4 sec   TiMin- 0.2 sec)     Humidifier: [x] Heated ()        [x] Water chamber replacement ()/ 1 per 6 months        Mask:   [x] Nasal () /1 per 3 months [] Full Face () /1 per 3 months   [x] Patient choice -Size and fit mask [] Patient Choice - Size and fit mask   [] Dispense:  [] Dispense:    [x] Headgear () / 1 per 3 months [] Headgear () / 1 per 3 months   [x] Replacement Nasal Cushion ()/2 per month [] Interface Replacement ()/1 per month   [] Replacement Nasal Pillows ()/2 per month         Tubing: [x] Heated ()/1 per 3 months    [] Standard ()/1 per 3 months [] Other:           Filters: [x] Non-disposable ()/1 per 6 months     [x] Ultra-Fine, Disposable ()/2 per month        Miscellaneous: [] Chin Strap ()/ 1 per 6 months [] O2 bleed-in:       LPM   [] Oximetry on CPAP/Bilevel []  Other:    [x] Modem: ()         Start Order Date: 22    MEDICAL JUSTIFICATION:  I, the undersigned, certify that the above prescribed supplies are medically necessary for this patients wellbeing. In my opinion, the supplies are both reasonable and necessary in reference to accepted standards of medicalpractice in treatment of this patients condition.     Simone Conteh MD      NPI: [id number]       Order Signed Date: 22    Electronically signed by Hailey Barrios MD on 2022 at 2:27 PM    Rafia Six  1995  [address]  [phone] (home)   [phone] (mobile)      Insurance Info (confirm with patient if correct):  Payor/Plan Subscr  Sex Relation Sub.  Ins. ID Effective Group Num

## 2022-01-12 NOTE — TELEPHONE ENCOUNTER
Spoke with pt to review HST. Order to be sent to [address] [place] [address]. F/U scheduled. No

## 2022-02-07 ENCOUNTER — VIRTUAL VISIT (OUTPATIENT)
Dept: PSYCHOLOGY | Age: 27
End: 2022-02-07
Payer: MEDICAID

## 2022-02-07 DIAGNOSIS — F41.1 GAD (GENERALIZED ANXIETY DISORDER): ICD-10-CM

## 2022-02-07 DIAGNOSIS — F33.1 MODERATE EPISODE OF RECURRENT MAJOR DEPRESSIVE DISORDER (HCC): Primary | ICD-10-CM

## 2022-02-07 PROCEDURE — 90832 PSYTX W PT 30 MINUTES: CPT | Performed by: PSYCHOLOGIST

## 2022-02-07 NOTE — PROGRESS NOTES
TELEHEALTH VISIT -- Audio/Visual (During WIFXW-39 public health emergency)  }  Pursuant to the emergency declaration under the [address] [place] [address], [facility] authority and the [employer] Act, this Virtual Visit was conducted, with patient's consent, to reduce the patient's risk of exposure to COVID-19 and provide continuity of care for an established patient. Services were provided through a video synchronous discussion virtually to substitute for in-person clinic visit. Pt gave verbal informed consent to participate in telehealth services. Conducted a risk-benefit analysis and determined that the patient's presenting problems are consistent with the use of telepsychology. Determined that the patient has sufficient knowledge and skills in the use of technology enabling them to adequately benefit from telepsychology. It was determined that this patient was able to be properly treated without an in-person session. Patient verified that they were currently located at the [facility] address that was provided during registration or another [facility] address, if noted below. Verified the following information:  Patient's identification: Yes  [facility] [place] [id number]   Patient's call back number: [phone]   Patient's emergency contact's name and number, as well as permission to contact them if needed: Extended Emergency Contact Information  Primary Emergency Contact: Johnybernardino Kehinde  Address: [address] [place] [address] Phone: [phone]  Relation: Parent     Provider location: Jonatan Alejandro, Ph.D.  Psychologist  2/7/2022  9:02 AM      Time spent with Patient: 28 minutes  This is patient's 31st  [facility] appointment.     Reason for Consult:    Chief Complaint   Patient presents with   Craig Depression Feedback given to PCP. S:  Pt seen for f/u of depression and anxiety. Seen for first visit in almost 3 mos d/t her work schedule. Pt reported stable mood and sxs. 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 Dad has moved entirely back in w pt's mom. \"there's nothing I can do at this point. .. a certain level of apathy is setting in. \" Plans to no longer come into their home, mom attempted to manipulate pt to change her mind. Roommate prioritizes romantic relationships over others, reinforces her sense that she doesn't deserve good friends. Does have ARAM, has been exhausted all the time. Working on getting CPAP, delayed d/t recall. O:  MSE:  Appearance    alert, cooperative  Appetite normal  Sleep disturbance No  Fatigue Yes  Loss of pleasure No  Impulsive behavior No  Speech    spontaneous, normal rate, normal volume and well articulated  Mood    Depressed  Affect    depressed affect  Thought Content    intact  Thought Process    goal directed and coherent  Associations    logical connections  Insight    Fair  Judgment    Intact  Orientation    oriented to person, place, time, and general circumstances  Memory    recent and remote memory intact  Attention/Concentration    impaired  Morbid ideation No  Suicide Assessment    no suicidal ideation    History:  Social History:   Social History     Socioeconomic History    Marital status: Single     Spouse name: Not on file    Number of children: Not on file    Years of education: Not on file    Highest education level: Not on file   Occupational History    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    Tobacco Use    Smoking status: Never Smoker    Smokeless tobacco: Never Used   Vaping Use    Vaping Use: Never used   Substance and Sexual Activity    Alcohol use: Yes     Comment: 1 mikes hard lemonade in 1 month    Drug use: Never    Sexual activity: Not Currently   Other Topics Concern    Not on file   Social History Narrative    Works at [employer]. Lives with her friend. 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 For fun, she enjoys gardening. Social Determinants of Health     Financial Resource Strain: Low Risk     Difficulty of Paying Living Expenses: Not hard at all   Food Insecurity: No Food Insecurity    Worried About Running Out of Food in the Last Year: Never true    B[place]va of Food in the Last Year: Never true   Transportation Needs:     Lack of Transportation (Medical): Not on file    Lack of Transportation (Non-Medical): Not on file   Physical Activity:     Days of Exercise per Week: Not on file    Minutes of Exercise per Session: Not on file   Stress:     Feeling of Stress : Not on file   Social Connections:     Frequency of Communication with Friends and Family: Not on file    Frequency of Social Gatherings with Friends and Family: Not on file    Attends Catholic Services: Not on file    Active Member of [address] [place] [address] [employer] or Organizations: Not on file    Attends Club or Organization Meetings: Not on file    Marital Status: Not on file   Intimate Partner Violence:     Fear of Current or Ex-Partner: Not on file    Emotionally Abused: Not on file    Physically Abused: Not on file    Sexually Abused: Not on file   Housing Stability:     Unable to Pay for Housing in the Last Year: Not on file    Number of [place] in the Last Year: Not on file    Unstable Housing in the Last Year: Not on file     TOBACCO:   reports that she has never smoked. She has never used smokeless tobacco.  ETOH:   reports current alcohol use. Diagnosis:  1. Moderate episode of recurrent major depressive disorder ([facility] [address]    2.  BRENTON (generalized anxiety disorder)          Plan:  Pt interventions:  Trained in strategies for increasing balanced thinking, Discussed potential barriers to change, Supportive techniques and Identified maladaptive thoughts        Documentation was done using voice recognition dragon software. Every effort was made to ensure accuracy; however, inadvertent, unintentional computerized transcription errors may be present.

## 2022-02-22 ENCOUNTER — OFFICE VISIT (OUTPATIENT)
Dept: INTERNAL MEDICINE CLINIC | Age: 27
End: 2022-02-22
Payer: MEDICAID

## 2022-02-22 VITALS
SYSTOLIC BLOOD PRESSURE: 124 MMHG | BODY MASS INDEX: 46.56 KG/M2 | DIASTOLIC BLOOD PRESSURE: 80 MMHG | TEMPERATURE: 97.4 F | HEIGHT: 62 IN | WEIGHT: 253 LBS

## 2022-02-22 DIAGNOSIS — R06.83 SNORING: ICD-10-CM

## 2022-02-22 DIAGNOSIS — E66.01 CLASS 3 SEVERE OBESITY DUE TO EXCESS CALORIES WITHOUT SERIOUS COMORBIDITY WITH BODY MASS INDEX (BMI) OF 40.0 TO 44.9 IN ADULT (HCC): ICD-10-CM

## 2022-02-22 DIAGNOSIS — F41.9 ANXIETY AND DEPRESSION: Primary | ICD-10-CM

## 2022-02-22 DIAGNOSIS — J30.89 SEASONAL ALLERGIC RHINITIS DUE TO OTHER ALLERGIC TRIGGER: ICD-10-CM

## 2022-02-22 DIAGNOSIS — F32.A ANXIETY AND DEPRESSION: Primary | ICD-10-CM

## 2022-02-22 PROCEDURE — G8427 DOCREV CUR MEDS BY ELIG CLIN: HCPCS | Performed by: NURSE PRACTITIONER

## 2022-02-22 PROCEDURE — G8417 CALC BMI ABV UP PARAM F/U: HCPCS | Performed by: NURSE PRACTITIONER

## 2022-02-22 PROCEDURE — G8484 FLU IMMUNIZE NO ADMIN: HCPCS | Performed by: NURSE PRACTITIONER

## 2022-02-22 PROCEDURE — 99214 OFFICE O/P EST MOD 30 MIN: CPT | Performed by: NURSE PRACTITIONER

## 2022-02-22 PROCEDURE — 1036F TOBACCO NON-USER: CPT | Performed by: NURSE PRACTITIONER

## 2022-02-22 RX ORDER — BUPROPION HYDROCHLORIDE 300 MG/1
300 TABLET ORAL EVERY MORNING
Qty: 90 TABLET | Refills: 0 | Status: SHIPPED | OUTPATIENT
Start: 2022-02-22 | End: 2022-04-05 | Stop reason: SDUPTHER

## 2022-02-22 RX ORDER — SERTRALINE HYDROCHLORIDE 100 MG/1
200 TABLET, FILM COATED ORAL DAILY
Qty: 180 TABLET | Refills: 0 | Status: SHIPPED | OUTPATIENT
Start: 2022-02-22 | End: 2022-04-05 | Stop reason: SDUPTHER

## 2022-02-22 ASSESSMENT — ANXIETY QUESTIONNAIRES
4. TROUBLE RELAXING: 3-NEARLY EVERY DAY
GAD7 TOTAL SCORE: 20
3. WORRYING TOO MUCH ABOUT DIFFERENT THINGS: 3-NEARLY EVERY DAY
5. BEING SO RESTLESS THAT IT IS HARD TO SIT STILL: 3-NEARLY EVERY DAY
1. FEELING NERVOUS, ANXIOUS, OR ON EDGE: 3-NEARLY EVERY DAY
6. BECOMING EASILY ANNOYED OR IRRITABLE: 2-OVER HALF THE DAYS
2. NOT BEING ABLE TO STOP OR CONTROL WORRYING: 3-NEARLY EVERY DAY
7. FEELING AFRAID AS IF SOMETHING AWFUL MIGHT HAPPEN: 3-NEARLY EVERY DAY

## 2022-02-22 ASSESSMENT — PATIENT HEALTH QUESTIONNAIRE - PHQ9
SUM OF ALL RESPONSES TO PHQ QUESTIONS 1-9: 13
SUM OF ALL RESPONSES TO PHQ QUESTIONS 1-9: 13
SUM OF ALL RESPONSES TO PHQ9 QUESTIONS 1 & 2: 3
2. FEELING DOWN, DEPRESSED OR HOPELESS: 2
4. FEELING TIRED OR HAVING LITTLE ENERGY: 3
1. LITTLE INTEREST OR PLEASURE IN DOING THINGS: 1
10. IF YOU CHECKED OFF ANY PROBLEMS, HOW DIFFICULT HAVE THESE PROBLEMS MADE IT FOR YOU TO DO YOUR WORK, TAKE CARE OF THINGS AT HOME, OR GET ALONG WITH OTHER PEOPLE: 1
5. POOR APPETITE OR OVEREATING: 0
3. TROUBLE FALLING OR STAYING ASLEEP: 3
9. THOUGHTS THAT YOU WOULD BE BETTER OFF DEAD, OR OF HURTING YOURSELF: 0
6. FEELING BAD ABOUT YOURSELF - OR THAT YOU ARE A FAILURE OR HAVE LET YOURSELF OR YOUR FAMILY DOWN: 3
7. TROUBLE CONCENTRATING ON THINGS, SUCH AS READING THE NEWSPAPER OR WATCHING TELEVISION: 1
8. MOVING OR SPEAKING SO SLOWLY THAT OTHER PEOPLE COULD HAVE NOTICED. OR THE OPPOSITE, BEING SO FIGETY OR RESTLESS THAT YOU HAVE BEEN MOVING AROUND A LOT MORE THAN USUAL: 0
SUM OF ALL RESPONSES TO PHQ QUESTIONS 1-9: 13
SUM OF ALL RESPONSES TO PHQ QUESTIONS 1-9: 13

## 2022-02-22 ASSESSMENT — ENCOUNTER SYMPTOMS
COUGH: 0
WHEEZING: 0
NAUSEA: 0
VOMITING: 0
SHORTNESS OF BREATH: 0
ABDOMINAL PAIN: 0
CONSTIPATION: 0
DIARRHEA: 0

## 2022-02-22 NOTE — PROGRESS NOTES
Office Visit   2/22/2022    Subjective:  Chief Complaint   Patient presents with    Follow-up     ran out of meds x 1 week ago     Depression    Anxiety     HPI:   Kyle Grier is a 23 y.o. female who presents to the clinic today for follow up. Depression- prescribed Zoloft 200 mg PO daily and wellbutrin 300 mg daily. States her mood was doing better on this regimen, but she ran out of wellbutrin and her mood has been worsening since. Anxiety and depression present. Denies side effects when taking this medication. Denies anxiety attacks. Denies SI/HI.  Seeing psychology and this is going well.      Snoring- Referred to sleep medicine. Waiting on CPAP per pt.     Allergic rhinitis- taking zyrtec PRN.  Well controlled at this time.     Obesity- not exercising. Diet is reported as \"not really\" healthy- stating \"I have been eating a lot of take out recently. \"    Now a \"primary opener\" at work. States this is a reliable job, but has stressors. Vitals 2/22/2022 12/1/2021 11/2/2021 9/21/2021   Weight 253 lb 250 lb 250 lb 9.6 oz 246 lb 12.8 oz     Review of Systems   Constitutional: Negative for chills, fatigue and fever. Respiratory: Negative for cough, shortness of breath and wheezing. Cardiovascular: Negative for chest pain, palpitations and leg swelling. Gastrointestinal: Negative for abdominal pain, constipation, diarrhea, nausea and vomiting. Skin: Negative for pallor and rash. Neurological: Negative for dizziness, weakness, light-headedness, numbness and headaches. Psychiatric/Behavioral: Positive for dysphoric mood and sleep disturbance. Negative for self-injury and suicidal ideas. The patient is nervous/anxious.       Allergies   Allergen Reactions    Ceftin [Cefuroxime] Rash       Current Outpatient Rx   Medication Sig Dispense Refill    buPROPion (WELLBUTRIN XL) 300 MG extended release tablet Take 1 tablet by mouth every morning 90 tablet 0    sertraline (ZOLOFT) 100 MG tablet Take 2 tablets by mouth daily 180 tablet 0    norethindrone-ethinyl estradiol-iron (MICROGESTIN FE1.5/30) 1.5-30 MG-MCG tablet Take 1 tablet by mouth daily      clobetasol (TEMOVATE) 0.05 % cream Apply thin layer once or twice daily to eczema prone skin. Try to break for a full 1 week after 3 weeks on the same area. 60 g 0    cetirizine (ZYRTEC) 10 MG tablet Take 10 mg by mouth daily         Patient Active Problem List   Diagnosis    Allergic rhinitis due to allergen    Anxiety and depression    Moderate episode of recurrent major depressive disorder ([facility] [address]    BRENTON (generalized anxiety disorder)    Morbidly obese (HCC)    Radial styloid tenosynovitis    Snoring        Wt Readings from Last 3 Encounters:   02/22/22 253 lb (114.8 kg)   12/01/21 250 lb (113.4 kg)   11/02/21 250 lb 9.6 oz (113.7 kg)     BP Readings from Last 3 Encounters:   02/22/22 124/80   12/01/21 122/76   11/02/21 122/74     The ASCVD Risk score (Cecile Lucero., et al., 2013) failed to calculate for the following reasons: The 2013 ASCVD risk score is only valid for ages 36 to 78    PHQ-9 Total Score: 13 (2/22/2022  9:12 AM)  Thoughts that you would be better off dead, or of hurting yourself in some way: 0 (2/22/2022  9:12 AM)    Objective/Physical Exam:  /80   Temp 97.4 °F (36.3 °C) (Temporal)   Ht 5' 2\" (1.575 m)   Wt 253 lb (114.8 kg)   BMI 46.27 kg/m²   Body mass index is 46.27 kg/m². Physical Exam  Vitals reviewed. Constitutional:       General: She is not in acute distress. Appearance: She is well-developed. She is not diaphoretic. HENT:      Head: Normocephalic and atraumatic. Eyes:      Pupils: Pupils are equal, round, and reactive to light. Cardiovascular:      Rate and Rhythm: Normal rate and regular rhythm. Pulmonary:      Effort: Pulmonary effort is normal. No respiratory distress. Breath sounds: Normal breath sounds. No wheezing or rales. Chest:      Chest wall: No tenderness.    Skin:     General: Skin is warm and dry. Neurological:      Mental Status: She is alert and oriented to person, place, and time. Coordination: Coordination normal.   Psychiatric:         Mood and Affect: Mood normal.       Assessment and Plan:  Nikki Celis was seen today for follow-up, depression and anxiety. Diagnoses and all orders for this visit:    Anxiety and depression  -    Chronic. Uncontrolled. - Last PHQ-9 was 10. Today's patient PHQ9 today is 13. States she ran out of wellbutrin. Denies SI/HI. - Pt would like to restart this medication - denies side effects on that regimen.  - Restart previous regimen  - buPROPion (WELLBUTRIN XL) 300 MG extended release tablet; Take 1 tablet by mouth every morning  -     sertraline (ZOLOFT) 100 MG tablet; Take 2 tablets by mouth daily  - 4 week f/u    Snoring   - Continue with sleep medicine    Seasonal allergic rhinitis due to other allergic trigger   - Continue current regimen    Class 3 severe obesity due to excess calories without serious comorbidity with body mass index (BMI) of 40.0 to 44.9 in [facility])   - Lifestyle modifications such as exercise, weight loss and healthy diet encouraged and reviewed with the pt. Return in about 4 weeks (around 3/22/2022) for mood f/u, or sooner if needed. Pt will call if symptoms worsen or fail to improve. All questions answered. Pt states no further questions or concerns at this time.    Electronically signed by: MARS Cadena CNP 02/22/22

## 2022-03-07 ENCOUNTER — TELEMEDICINE (OUTPATIENT)
Dept: PSYCHOLOGY | Age: 27
End: 2022-03-07
Payer: MEDICAID

## 2022-03-07 DIAGNOSIS — F33.1 MODERATE EPISODE OF RECURRENT MAJOR DEPRESSIVE DISORDER (HCC): Primary | ICD-10-CM

## 2022-03-07 DIAGNOSIS — F41.1 GAD (GENERALIZED ANXIETY DISORDER): ICD-10-CM

## 2022-03-07 PROCEDURE — 90832 PSYTX W PT 30 MINUTES: CPT | Performed by: PSYCHOLOGIST

## 2022-03-07 NOTE — PROGRESS NOTES
Works at [employer]. Lives with her friend. 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 For fun, she enjoys gardening. Social Determinants of Health     Financial Resource Strain: Low Risk     Difficulty of Paying Living Expenses: Not hard at all   Food Insecurity: No Food Insecurity    Worried About Running Out of Food in the Last Year: Never true    B[place] of Food in the Last Year: Never true   Transportation Needs:     Lack of Transportation (Medical): Not on file    Lack of Transportation (Non-Medical): Not on file   Physical Activity:     Days of Exercise per Week: Not on file    Minutes of Exercise per Session: Not on file   Stress:     Feeling of Stress : Not on file   Social Connections:     Frequency of Communication with Friends and Family: Not on file    Frequency of Social Gatherings with Friends and Family: Not on file    Attends Yarsani Services: Not on file    Active Member of [address] [place] [address] or Organizations: Not on file    Attends Club or Organization Meetings: Not on file    Marital Status: Not on file   Intimate Partner Violence:     Fear of Current or Ex-Partner: Not on file    Emotionally Abused: Not on file    Physically Abused: Not on file    Sexually Abused: Not on file   Housing Stability:     Unable to Pay for Housing in the Last Year: Not on file    Number of [place] in the Last Year: Not on file    Unstable Housing in the Last Year: Not on file     TOBACCO:   reports that she has never smoked. She has never used smokeless tobacco.  ETOH:   reports current alcohol use. Diagnosis:  1. Moderate episode of recurrent major depressive disorder ([facility] [address]    2.  BRENTON (generalized anxiety disorder)          Plan:  Pt interventions:  Discussed and problem-solved barriers in adhering to behavioral change plan, Motivational Interviewing to increase patient confidence and compliance with adhering to behavioral change plan, Supportive techniques and Identified maladaptive thoughts        Documentation was done using voice recognition dragon software. Every effort was made to ensure accuracy; however, inadvertent, unintentional computerized transcription errors may be present. 22 y/o female with AV block. Denies dizziness, shortness of breath. Here today for PST for pacemaker placement. 22 y/o female with AV block, complete. Denies dizziness, shortness of breath, chest pain. Complain of fatigue. Pt's mother stated "her heart rate drops very low at night." Here today for PST for His bundle pacemaker placement.

## 2022-04-04 ENCOUNTER — TELEPHONE (OUTPATIENT)
Dept: PSYCHOLOGY | Age: 27
End: 2022-04-04

## 2022-04-04 ENCOUNTER — TELEMEDICINE (OUTPATIENT)
Dept: PSYCHOLOGY | Age: 27
End: 2022-04-04
Payer: MEDICAID

## 2022-04-04 DIAGNOSIS — F33.1 MODERATE EPISODE OF RECURRENT MAJOR DEPRESSIVE DISORDER (HCC): Primary | ICD-10-CM

## 2022-04-04 DIAGNOSIS — F41.1 GAD (GENERALIZED ANXIETY DISORDER): ICD-10-CM

## 2022-04-04 PROCEDURE — 90832 PSYTX W PT 30 MINUTES: CPT | Performed by: PSYCHOLOGIST

## 2022-04-04 NOTE — TELEPHONE ENCOUNTER
----- Message from Alejandra Montiel sent at 4/4/2022  8:56 AM EDT -----  Subject: Message to Provider    QUESTIONS  Information for Provider? Patient needs to cancel appt with   psychology,today   ---------------------------------------------------------------------------  --------------  CALL BACK INFO  What is the best way for the office to contact you? OK to leave message on   voicemail  Preferred Call Back Phone Number? [phone]  ---------------------------------------------------------------------------  --------------  SCRIPT ANSWERS  Relationship to Patient?  Self

## 2022-04-04 NOTE — PROGRESS NOTES
TELEHEALTH VISIT -- Audio/Visual (During NKJCU-71 public health emergency)  }  Mango Taylor was evaluated through a synchronous (real-time) audio-video encounter (via [employer] me). The patient (or guardian, if applicable) is aware that this is a billable service, which includes applicable co-pays. This Virtual Visit was conducted with patient's (and/or legal guardian's) consent. The visit was conducted pursuant to the emergency declaration under the [address] [place] [address], [address] [place] [address] authority and the [employer] Act. Patient identification was verified, and a caregiver was present when appropriate. The patient was located in a state where the provider was licensed to provide care. Conducted a risk-benefit analysis and determined that the patient's presenting problems are consistent with the use of telepsychology. Determined that the patient has sufficient knowledge and skills in the use of technology enabling them to adequately benefit from telepsychology. It was determined that this patient was able to be properly treated without an in-person session. Patient verified that they were currently located at the [place], [place], or [place] address that was provided during registration or another address, if noted below.     Verified the following information:  Patient's identification: Yes  Patient location: [place] [id number]   Patient's call back number: [phone]   Patient's emergency contact's name and number, as well as permission to contact them if needed: Extended Emergency Contact Information  Primary Emergency Contact: Maricarmen Granda  Address: [address] [place] [address] Phone: [phone]  Relation: Parent     Provider location: Adelita Galeana, Ph.D.  Psychologist  4/4/2022  9:43 AM      Time spent with Patient: 25 minutes  This is patient's 33rd  [facility] appointment. Reason for Consult:    Chief Complaint   Patient presents with    Depression       Feedback given to PCP. S:  Pt seen for f/u of depression. Pt reported variable mood and sxs. But her 2 wk notice on Friday \"it felt like a weight's been lifted. \" Last few weeks have been \"hell. 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 Roommate is likely moving out and pt would like to live alone. \"June, July, Aug period in my head is armageddon. \" Unpredictability of work and living situation is stressful, discussed increasing known variables. Discussed rel w roommate. Been getting 3-4 hrs of sleep nightly, sometimes napping, but generally not. Waiting on appt w sleep medicine. 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 O:  MSE:  Appearance    alert, cooperative  Appetite normal  Sleep disturbance No  Fatigue Yes  Loss of pleasure No  Impulsive behavior No  Speech    spontaneous, normal rate, normal volume and well articulated  Mood    Depressed  Affect    depressed affect  Thought Content    intact  Thought Process    goal directed and coherent  Associations    logical connections  Insight    Fair  Judgment    Intact  Orientation    oriented to person, place, time, and general circumstances  Memory    recent and remote memory intact  Attention/Concentration    impaired  Morbid ideation No  Suicide Assessment    no suicidal ideation  History:  Social History:   Social History     Socioeconomic History    Marital status: Single     Spouse name: Not on file    Number of children: Not on file    Years of education: Not on file    Highest education level: Not on file   Occupational History    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    Tobacco Use    Smoking status: Never Smoker    Smokeless tobacco: Never Used   Vaping Use    Vaping Use: Never used   Substance and Sexual Activity    Alcohol use: Yes     Comment: 1 mikes hard lemonade in 1 month    Drug use: Never    Sexual activity: Not Currently   Other Topics Concern    Not on file   Social History Narrative    Works at [employer]. Lives with her friend. 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 For fun, she enjoys gardening. Social Determinants of Health     Financial Resource Strain: Low Risk     Difficulty of Paying Living Expenses: Not hard at all   Food Insecurity: No Food Insecurity    Worried About Running Out of Food in the Last Year: Never true    Batsheva of Food in the Last Year: Never true   Transportation Needs:     Lack of Transportation (Medical): Not on file    Lack of Transportation (Non-Medical): Not on file   Physical Activity:     Days of Exercise per Week: Not on file    Minutes of Exercise per Session: Not on file   Stress:     Feeling of Stress : Not on file   Social Connections:     Frequency of Communication with Friends and Family: Not on file    Frequency of Social Gatherings with Friends and Family: Not on file    Attends Christian Services: Not on file    Active Member of [employer] or Organizations: Not on file    Attends Club or Organization Meetings: Not on file    Marital Status: Not on file   Intimate Partner Violence:     Fear of Current or Ex-Partner: Not on file    Emotionally Abused: Not on file    Physically Abused: Not on file    Sexually Abused: Not on file   Housing Stability:     Unable to Pay for Housing in the Last Year: Not on file    Number of [place] in the Last Year: Not on file    Unstable Housing in the Last Year: Not on file     TOBACCO:   reports that she has never smoked. She has never used smokeless tobacco.  ETOH:   reports current alcohol use. Diagnosis:    1. Moderate episode of recurrent major depressive disorder ([facility] [address]    2.  BRENTON (generalized anxiety disorder)        Plan:  Pt interventions:  Practiced assertive communication, Trained in strategies for increasing balanced thinking and Discussed self-care (sleep, nutrition, rewarding activities, social support, exercise)        Documentation was done using voice recognition dragon software. Every effort was made to ensure accuracy; however, inadvertent, unintentional computerized transcription errors may be present.

## 2022-04-05 ENCOUNTER — OFFICE VISIT (OUTPATIENT)
Dept: INTERNAL MEDICINE CLINIC | Age: 27
End: 2022-04-05
Payer: MEDICAID

## 2022-04-05 VITALS
WEIGHT: 250.4 LBS | BODY MASS INDEX: 46.08 KG/M2 | HEART RATE: 68 BPM | DIASTOLIC BLOOD PRESSURE: 78 MMHG | HEIGHT: 62 IN | SYSTOLIC BLOOD PRESSURE: 128 MMHG

## 2022-04-05 DIAGNOSIS — E66.01 CLASS 3 SEVERE OBESITY DUE TO EXCESS CALORIES WITHOUT SERIOUS COMORBIDITY WITH BODY MASS INDEX (BMI) OF 40.0 TO 44.9 IN ADULT (HCC): ICD-10-CM

## 2022-04-05 DIAGNOSIS — R06.83 SNORING: ICD-10-CM

## 2022-04-05 DIAGNOSIS — J30.89 SEASONAL ALLERGIC RHINITIS DUE TO OTHER ALLERGIC TRIGGER: ICD-10-CM

## 2022-04-05 DIAGNOSIS — F41.9 ANXIETY AND DEPRESSION: Primary | ICD-10-CM

## 2022-04-05 DIAGNOSIS — L20.9 ATOPIC DERMATITIS, UNSPECIFIED TYPE: ICD-10-CM

## 2022-04-05 DIAGNOSIS — F32.A ANXIETY AND DEPRESSION: Primary | ICD-10-CM

## 2022-04-05 PROCEDURE — 99213 OFFICE O/P EST LOW 20 MIN: CPT | Performed by: NURSE PRACTITIONER

## 2022-04-05 PROCEDURE — G8427 DOCREV CUR MEDS BY ELIG CLIN: HCPCS | Performed by: NURSE PRACTITIONER

## 2022-04-05 PROCEDURE — G8417 CALC BMI ABV UP PARAM F/U: HCPCS | Performed by: NURSE PRACTITIONER

## 2022-04-05 PROCEDURE — 1036F TOBACCO NON-USER: CPT | Performed by: NURSE PRACTITIONER

## 2022-04-05 RX ORDER — BUPROPION HYDROCHLORIDE 300 MG/1
300 TABLET ORAL EVERY MORNING
Qty: 90 TABLET | Refills: 0 | Status: SHIPPED | OUTPATIENT
Start: 2022-04-05 | End: 2022-07-12 | Stop reason: SDUPTHER

## 2022-04-05 RX ORDER — SERTRALINE HYDROCHLORIDE 100 MG/1
200 TABLET, FILM COATED ORAL DAILY
Qty: 180 TABLET | Refills: 0 | Status: SHIPPED | OUTPATIENT
Start: 2022-04-05 | End: 2022-07-12 | Stop reason: SDUPTHER

## 2022-04-05 RX ORDER — CLOBETASOL PROPIONATE 0.5 MG/G
CREAM TOPICAL
Qty: 60 G | Refills: 0 | Status: SHIPPED | OUTPATIENT
Start: 2022-04-05

## 2022-04-05 ASSESSMENT — ENCOUNTER SYMPTOMS
WHEEZING: 0
NAUSEA: 0
SHORTNESS OF BREATH: 0
DIARRHEA: 0
ABDOMINAL PAIN: 0
COUGH: 0
VOMITING: 0
CONSTIPATION: 0

## 2022-04-05 ASSESSMENT — PATIENT HEALTH QUESTIONNAIRE - PHQ9
5. POOR APPETITE OR OVEREATING: 0
1. LITTLE INTEREST OR PLEASURE IN DOING THINGS: 0
2. FEELING DOWN, DEPRESSED OR HOPELESS: 2
10. IF YOU CHECKED OFF ANY PROBLEMS, HOW DIFFICULT HAVE THESE PROBLEMS MADE IT FOR YOU TO DO YOUR WORK, TAKE CARE OF THINGS AT HOME, OR GET ALONG WITH OTHER PEOPLE: 1
SUM OF ALL RESPONSES TO PHQ QUESTIONS 1-9: 11
7. TROUBLE CONCENTRATING ON THINGS, SUCH AS READING THE NEWSPAPER OR WATCHING TELEVISION: 1
9. THOUGHTS THAT YOU WOULD BE BETTER OFF DEAD, OR OF HURTING YOURSELF: 0
SUM OF ALL RESPONSES TO PHQ QUESTIONS 1-9: 11
6. FEELING BAD ABOUT YOURSELF - OR THAT YOU ARE A FAILURE OR HAVE LET YOURSELF OR YOUR FAMILY DOWN: 2
SUM OF ALL RESPONSES TO PHQ9 QUESTIONS 1 & 2: 2
8. MOVING OR SPEAKING SO SLOWLY THAT OTHER PEOPLE COULD HAVE NOTICED. OR THE OPPOSITE, BEING SO FIGETY OR RESTLESS THAT YOU HAVE BEEN MOVING AROUND A LOT MORE THAN USUAL: 0
SUM OF ALL RESPONSES TO PHQ QUESTIONS 1-9: 11
3. TROUBLE FALLING OR STAYING ASLEEP: 3
4. FEELING TIRED OR HAVING LITTLE ENERGY: 3
SUM OF ALL RESPONSES TO PHQ QUESTIONS 1-9: 11

## 2022-04-05 NOTE — PROGRESS NOTES
Office Visit  4/5/2022    Subjective:  Chief Complaint   Patient presents with    Depression     HPI:   Nolberto Chopra is a 33 y.o. female who presents to the clinic today for follow up. Depression- prescribed Zoloft 200 mg PO daily and wellbutrin 300 mg daily. States her mood is doing better on this regimen. Denies side effects when taking this medication. Denies anxiety attacks. Denies SI/HI.  Seeing psychology and this is going well.   Put her two weeks notice in at work and is applying for other jobs. Allergic rhinitis- taking zyrtec PRN. Well controlled at this time. Was seen by another provider years ago regarding atopic dermatitis of the hands. States allergies cause this to worsen. She was prescribed clobetasol, which works very well as needed. Use is rare. She would like a refill today. Snoring- Referred to sleep medicine. Waiting on CPAP per pt. Obesity- doing stretches and yoga. Diet is reported as \"got better. \" States she took a week off of work and this helped her. Vitals 4/5/2022 2/22/2022 12/1/2021 11/2/2021   Weight 250 lb 6.4 oz 253 lb 250 lb 250 lb 9.6 oz     Review of Systems   Constitutional: Negative for chills, fatigue, fever and unexpected weight change. Eyes: Negative for visual disturbance. Respiratory: Negative for cough, shortness of breath and wheezing. Cardiovascular: Negative for chest pain, palpitations and leg swelling. Gastrointestinal: Negative for abdominal pain, constipation, diarrhea, nausea and vomiting. Skin: Negative for pallor and rash. Neurological: Negative for dizziness, weakness, light-headedness, numbness and headaches. Psychiatric/Behavioral: Negative for dysphoric mood, self-injury, sleep disturbance and suicidal ideas. The patient is not nervous/anxious.       Allergies   Allergen Reactions    Ceftin [Cefuroxime] Rash     Current Outpatient Rx   Medication Sig Dispense Refill    clobetasol (TEMOVATE) 0.05 % cream Apply thin layer once or twice daily to eczema prone skin. Try to break for a full 1 week after 3 weeks on the same area. 60 g 0    buPROPion (WELLBUTRIN XL) 300 MG extended release tablet Take 1 tablet by mouth every morning 90 tablet 0    sertraline (ZOLOFT) 100 MG tablet Take 2 tablets by mouth daily 180 tablet 0    norethindrone-ethinyl estradiol-iron (MICROGESTIN FE1.5/30) 1.5-30 MG-MCG tablet Take 1 tablet by mouth daily      cetirizine (ZYRTEC) 10 MG tablet Take 10 mg by mouth daily       Patient Active Problem List   Diagnosis    Allergic rhinitis due to allergen    Anxiety and depression    Moderate episode of recurrent major depressive disorder (HCC)    BRENTON (generalized anxiety disorder)    Morbidly obese (HCC)    Radial styloid tenosynovitis    Snoring      Wt Readings from Last 3 Encounters:   04/05/22 250 lb 6.4 oz (113.6 kg)   02/22/22 253 lb (114.8 kg)   12/01/21 250 lb (113.4 kg)     BP Readings from Last 3 Encounters:   04/05/22 128/78   02/22/22 124/80   12/01/21 122/76     The ASCVD Risk score (Abel Ornelas, et al., 2013) failed to calculate for the following reasons: The 2013 ASCVD risk score is only valid for ages 36 to 78    PHQ-9 Total Score: 11 (4/5/2022  2:39 PM)  Thoughts that you would be better off dead, or of hurting yourself in some way: 0 (4/5/2022  2:39 PM)    Objective/Physical Exam:  /78   Pulse 68   Ht 5' 2\" (1.575 m)   Wt 250 lb 6.4 oz (113.6 kg)   BMI 45.80 kg/m²   Body mass index is 45.8 kg/m². Physical Exam  Vitals reviewed. Constitutional:       General: She is not in acute distress. Appearance: She is well-developed. She is not diaphoretic. HENT:      Head: Normocephalic and atraumatic. Eyes:      Pupils: Pupils are equal, round, and reactive to light. Cardiovascular:      Rate and Rhythm: Normal rate and regular rhythm. Pulmonary:      Effort: Pulmonary effort is normal. No respiratory distress. Breath sounds: Normal breath sounds. No wheezing or rales. Chest:      Chest wall: No tenderness. Abdominal:      General: Bowel sounds are normal.      Palpations: Abdomen is soft. Skin:     General: Skin is warm and dry. Coloration: Skin is not pale. Findings: No erythema or rash. Neurological:      Mental Status: She is alert and oriented to person, place, and time. Coordination: Coordination normal.   Psychiatric:         Mood and Affect: Mood normal.       Assessment and Plan:  Suzanne Johnson was seen today for depression. Diagnoses and all orders for this visit:    Anxiety and depression  -     Last PHQ9 was 13 today's PHQ9 is 11. Denies SI/HI. - Reports her mood is doing better on the current regimen without side effects.  - Continue current regimen  - Continue with psychology  - buPROPion (WELLBUTRIN XL) 300 MG extended release tablet; Take 1 tablet by mouth every morning  -     sertraline (ZOLOFT) 100 MG tablet; Take 2 tablets by mouth daily  - Pt will call if symptoms worsen or fail to improve    Atopic dermatitis, unspecified type  -    Works well without side effects PRN. Only as needed in times of bad allergies. Would like a refill at this time. - clobetasol (TEMOVATE) 0.05 % cream; Apply thin layer once or twice daily to eczema prone skin. Try to break for a full 1 week after 3 weeks on the same area. -refilled today    Seasonal allergic rhinitis due to other allergic trigger   - Well-controlled with over-the-counter Zyrtec as needed. Snoring   - Continue with sleep medicine    Class 3 severe obesity due to excess calories without serious comorbidity with body mass index (BMI) of 40.0 to 44.9 in adult [facility])   - Lifestyle modifications such as exercise, weight loss and healthy diet encouraged and reviewed with the pt. States she has an OBGYN and thinks she had a pap last year.   States she has to call her OB/GYN anyway, so she will have them forward us Pap smear results  Return in about 3 months (around 7/5/2022) for annual exam/physical and mood f/u, or sooner if needed. Pt will call if symptoms worsen or fail to improve. All questions answered. Pt states no further questions or concerns at this time.    Electronically signed by: MARS Gaytan CNP 04/05/22

## 2022-05-02 ENCOUNTER — TELEMEDICINE (OUTPATIENT)
Dept: PSYCHOLOGY | Age: 27
End: 2022-05-02
Payer: MEDICAID

## 2022-05-02 DIAGNOSIS — F41.1 GAD (GENERALIZED ANXIETY DISORDER): ICD-10-CM

## 2022-05-02 DIAGNOSIS — F33.1 MODERATE EPISODE OF RECURRENT MAJOR DEPRESSIVE DISORDER (HCC): Primary | ICD-10-CM

## 2022-05-02 PROCEDURE — 90832 PSYTX W PT 30 MINUTES: CPT | Performed by: PSYCHOLOGIST

## 2022-05-02 NOTE — PROGRESS NOTES
TELEHEALTH VISIT -- Audio/Visual (During VGECL-14 public health emergency)  }  Tracey Pascal was evaluated through a synchronous (real-time) audio-video encounter (via Doxy. me). The patient (or guardian, if applicable) is aware that this is a billable service, which includes applicable co-pays. This Virtual Visit was conducted with patient's (and/or legal guardian's) consent. The visit was conducted pursuant to the emergency declaration under the [address] [place] [address] authority and the [employer] Act. Patient identification was verified, and a caregiver was present when appropriate. The patient was located in a state where the provider was licensed to provide care. Conducted a risk-benefit analysis and determined that the patient's presenting problems are consistent with the use of telepsychology. Determined that the patient has sufficient knowledge and skills in the use of technology enabling them to adequately benefit from telepsychology. It was determined that this patient was able to be properly treated without an in-person session. Patient verified that they were currently located at the [place], or [place] address that was provided during registration or another address, if noted below.     Verified the following information:  Patient's identification: Yes  Patient location: [place] [id number]   Patient's call back number: [phone]   Patient's emergency contact's name and number, as well as permission to contact them if needed: Extended Emergency Contact Information  Primary Emergency Contact: Rachel Hackett  Address: [address] [place] [address] Phone: [phone]  Relation: Parent     Provider location: Junior Norris, Ph.D.  Psychologist  5/2/2022  11:44 AM      Time spent with Patient: 23 minutes  This is patient's 34th  [facility] appointment. Reason for Consult:    Chief Complaint   Patient presents with    Depression       Feedback given to PCP. S:  Pt seen for f/u of depression. Pt reported stable mood and sxs. She is now working as  at [employer], hasn't been looking for other jobs yet. \"I don't want to be there very long, but I have a job. \" Dealing quite a bit w friend's crisis. Hasn't been applying for jobs largely d/t fatigue, not lack of confidence or motivation. Discussed rel w mom, regular fighting, boundaries not being respected. Very limited time for herself and her needs. Roommate is moving, on an uncertain timeline. O:  MSE:  Appearance    alert, cooperative  Appetite normal  Sleep disturbance No  Fatigue Yes  Loss of pleasure No  Impulsive behavior No  Speech    spontaneous, normal rate, normal volume and well articulated  Mood    Depressed  Affect    depressed affect  Thought Content    intact  Thought Process    goal directed and coherent  Associations    logical connections  Insight    Fair  Judgment    Intact  Orientation    oriented to person, place, time, and general circumstances  Memory    recent and remote memory intact  Attention/Concentration    impaired  Morbid ideation No  Suicide Assessment    no suicidal ideation    History:  Social History:   Social History     Socioeconomic History    Marital status: Single     Spouse name: Not on file    Number of children: Not on file    Years of education: Not on file    Highest education level: Not on file   Occupational History    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    Tobacco Use    Smoking status: Never Smoker    Smokeless tobacco: Never Used   Vaping Use    Vaping Use: Never used   Substance and Sexual Activity    Alcohol use: Yes     Comment: 1 mikes hard lemonade in 1 month    Drug use: Never  Sexual activity: Not Currently   Other Topics Concern    Not on file   Social History Narrative    Works at [employer]. Lives with her friend. 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 For fun, she enjoys gardening. Social Determinants of Health     Financial Resource Strain: Low Risk     Difficulty of Paying Living Expenses: Not hard at all   Food Insecurity: No Food Insecurity    Worried About Running Out of Food in the Last Year: Never true    Batsheva of Food in the Last Year: Never true   Transportation Needs:     Lack of Transportation (Medical): Not on file    Lack of Transportation (Non-Medical): Not on file   Physical Activity:     Days of Exercise per Week: Not on file    Minutes of Exercise per Session: Not on file   Stress:     Feeling of Stress : Not on file   Social Connections:     Frequency of Communication with Friends and Family: Not on file    Frequency of Social Gatherings with Friends and Family: Not on file    Attends Latter-day Services: Not on file    Active Member of [address] [place] [address] [employer] or Organizations: Not on file    Attends Club or Organization Meetings: Not on file    Marital Status: Not on file   Intimate Partner Violence:     Fear of Current or Ex-Partner: Not on file    Emotionally Abused: Not on file    Physically Abused: Not on file    Sexually Abused: Not on file   Housing Stability:     Unable to Pay for Housing in the Last Year: Not on file    Number of [place] in the Last Year: Not on file    Unstable Housing in the Last Year: Not on file     TOBACCO:   reports that she has never smoked. She has never used smokeless tobacco.  ETOH:   reports current alcohol use. Diagnosis:  1. Moderate episode of recurrent major depressive disorder ([facility] [address]    2.  BRENTON (generalized anxiety disorder)          Plan:  Pt interventions:  Trained in improving communication skills, Discussed self-care (sleep, nutrition, rewarding activities, social support, exercise), Supportive techniques and Provided Psychoeducation re: setting boundaries        Documentation was done using voice recognition dragon software. Every effort was made to ensure accuracy; however, inadvertent, unintentional computerized transcription errors may be present.

## 2022-06-18 DIAGNOSIS — F41.9 ANXIETY AND DEPRESSION: ICD-10-CM

## 2022-06-18 DIAGNOSIS — F32.A ANXIETY AND DEPRESSION: ICD-10-CM

## 2022-06-20 RX ORDER — BUPROPION HYDROCHLORIDE 300 MG/1
300 TABLET ORAL EVERY MORNING
Qty: 90 TABLET | Refills: 0 | OUTPATIENT
Start: 2022-06-20

## 2022-06-27 ENCOUNTER — TELEMEDICINE (OUTPATIENT)
Dept: PSYCHOLOGY | Age: 27
End: 2022-06-27
Payer: MEDICAID

## 2022-06-27 DIAGNOSIS — F33.1 MODERATE EPISODE OF RECURRENT MAJOR DEPRESSIVE DISORDER (HCC): Primary | ICD-10-CM

## 2022-06-27 DIAGNOSIS — F41.1 GAD (GENERALIZED ANXIETY DISORDER): ICD-10-CM

## 2022-06-27 PROCEDURE — 1036F TOBACCO NON-USER: CPT | Performed by: PSYCHOLOGIST

## 2022-06-27 PROCEDURE — 90832 PSYTX W PT 30 MINUTES: CPT | Performed by: PSYCHOLOGIST

## 2022-06-27 NOTE — PROGRESS NOTES
TELEHEALTH VISIT -- Audio/Visual (During IRJNU-02 public health emergency)  }  Felipe Henry was evaluated through a synchronous (real-time) audio-video encounter (via Doxy. me). The patient (or guardian, if applicable) is aware that this is a billable service, which includes applicable co-pays. This Virtual Visit was conducted with patient's (and/or legal guardian's) consent. The visit was conducted pursuant to the emergency declaration under the [address] [place] [address], [address] [place] [address] authority and the [employer] Act. Patient identification was verified, and a caregiver was present when appropriate. The patient was located in a state where the provider was licensed to provide care. Conducted a risk-benefit analysis and determined that the patient's presenting problems are consistent with the use of telepsychology. Determined that the patient has sufficient knowledge and skills in the use of technology enabling them to adequately benefit from telepsychology. It was determined that this patient was able to be properly treated without an in-person session. Patient verified that they were currently located at the [place], [place], or [place] address that was provided during registration or another address, if noted below.     Verified the following information:  Patient's identification: Yes  Patient location: [facility] [place] [place] [id number]   Patient's call back number: [phone]   Patient's emergency contact's name and number, as well as permission to contact them if needed: Extended Emergency Contact Information  Primary Emergency Contact: [facility]  Address: [address] [place] [address] Phone: [phone]  Relation: Parent     Provider location: [facility]  Lawrence Sifuentes, Ph.D.  Psychologist  6/27/2022  1:03 PM      Time spent with Patient: 27 minutes  This is patient's  35th   [facility] appointment. Reason for Consult:    Chief Complaint   Patient presents with    Depression       Feedback given to PCP. S:  Pt seen for f/u of depression and anxiety. Pt reported \"alright\" mood and sxs. Roommate currently has COVID, is moving end of July/early Aug. Cat had to be euthanized last week d/t inoperable tumor. \"If I don't think about it, I'm fine. \" Had her for 12 yrs \"we meant a lot to each other. \" Reported mom would surrender animals or just leave them at a [employer] when her animals would age, so she hasn't had to experience this before. Noticing she is closer to feeling happy since starting new job \"I'm not thinking quite as bleakly. \" Indicated she has a crush on her boss and believes it is mutual. Has been told he is  for \"legal reasons. \" He is 13 yrs older. Described him as kind and funny. Processed how she wants to handle this.      O:  MSE:  Appearance    alert, cooperative  Appetite normal  Sleep disturbance No  Fatigue Yes  Loss of pleasure No  Impulsive behavior No  Speech    spontaneous, normal rate, normal volume and well articulated  Mood    Depressed  Affect    depressed affect  Thought Content    intact  Thought Process    goal directed and coherent  Associations    logical connections  Insight    Fair  Judgment    Intact  Orientation    oriented to person, place, time, and general circumstances  Memory    recent and remote memory intact  Attention/Concentration    impaired  Morbid ideation No  Suicide Assessment    no suicidal ideation    History:  Social History:   Social History     Socioeconomic History    Marital status: Single     Spouse name: Not on file    Number of children: Not on file    Years of education: Not on file    Highest education level: Not on file   Occupational History    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 Tobacco Use    Smoking status: Never Smoker    Smokeless tobacco: Never Used   Vaping Use    Vaping Use: Never used   Substance and Sexual Activity    Alcohol use: Yes     Comment: 1 mikes hard lemonade in 1 month    Drug use: Never    Sexual activity: Not Currently   Other Topics Concern    Not on file   Social History Narrative    Works at [employer]. Lives with her friend. 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 For fun, she enjoys gardening. Social Determinants of Health     Financial Resource Strain: Low Risk     Difficulty of Paying Living Expenses: Not hard at all   Food Insecurity: No Food Insecurity    Worried About [address] in the Last Year: Never true    [address] [place] in the Last Year: Never true   Transportation Needs:     Lack of Transportation (Medical): Not on file    Lack of Transportation (Non-Medical): Not on file   Physical Activity:     Days of Exercise per Week: Not on file    Minutes of Exercise per Session: Not on file   Stress:     Feeling of Stress : Not on file   Social Connections:     Frequency of Communication with Friends and Family: Not on file    Frequency of Social Gatherings with Friends and Family: Not on file    Attends Hindu Services: Not on file    Active Member of Clubs or Organizations: Not on file    Attends Club or Organization Meetings: Not on file    Marital Status: Not on file   Intimate Partner Violence:     Fear of Current or Ex-Partner: Not on file    Emotionally Abused: Not on file    Physically Abused: Not on file    Sexually Abused: Not on file   Housing Stability:     Unable to Pay for Housing in the Last Year: Not on file    Number of [place] in the Last Year: Not on file    Unstable Housing in the Last Year: Not on file     TOBACCO:   reports that she has never smoked. She has never used smokeless tobacco.  ETOH:   reports current alcohol use. Diagnosis:  1. Moderate episode of recurrent major depressive disorder ([facility] [address]    2.  BRENTON (generalized anxiety disorder)          Plan:  Pt interventions:  Trained in strategies for increasing balanced thinking, Discussed self-care (sleep, nutrition, rewarding activities, social support, exercise), Supportive techniques, and Identified maladaptive thoughts        Documentation was done using voice recognition dragon software. Every effort was made to ensure accuracy; however, inadvertent, unintentional computerized transcription errors may be present.

## 2022-07-12 ENCOUNTER — OFFICE VISIT (OUTPATIENT)
Dept: INTERNAL MEDICINE CLINIC | Age: 27
End: 2022-07-12
Payer: MEDICAID

## 2022-07-12 VITALS
DIASTOLIC BLOOD PRESSURE: 84 MMHG | WEIGHT: 258.8 LBS | HEIGHT: 62 IN | SYSTOLIC BLOOD PRESSURE: 128 MMHG | HEART RATE: 79 BPM | BODY MASS INDEX: 47.62 KG/M2 | OXYGEN SATURATION: 98 %

## 2022-07-12 DIAGNOSIS — Z00.00 ANNUAL PHYSICAL EXAM: Primary | ICD-10-CM

## 2022-07-12 DIAGNOSIS — F32.A ANXIETY AND DEPRESSION: ICD-10-CM

## 2022-07-12 DIAGNOSIS — E66.01 CLASS 3 SEVERE OBESITY DUE TO EXCESS CALORIES WITHOUT SERIOUS COMORBIDITY WITH BODY MASS INDEX (BMI) OF 40.0 TO 44.9 IN ADULT (HCC): ICD-10-CM

## 2022-07-12 DIAGNOSIS — R06.83 SNORING: ICD-10-CM

## 2022-07-12 DIAGNOSIS — F41.9 ANXIETY AND DEPRESSION: ICD-10-CM

## 2022-07-12 DIAGNOSIS — R39.11 URINARY HESITANCY: ICD-10-CM

## 2022-07-12 DIAGNOSIS — J30.89 SEASONAL ALLERGIC RHINITIS DUE TO OTHER ALLERGIC TRIGGER: ICD-10-CM

## 2022-07-12 DIAGNOSIS — Z13.220 SCREENING, LIPID: ICD-10-CM

## 2022-07-12 LAB
BILIRUBIN, POC: NEGATIVE
BLOOD URINE, POC: NEGATIVE
CLARITY, POC: CLEAR
COLOR, POC: YELLOW
GLUCOSE URINE, POC: NEGATIVE
KETONES, POC: NEGATIVE
LEUKOCYTE EST, POC: NEGATIVE
NITRITE, POC: NEGATIVE
PH, POC: 6
PROTEIN, POC: NEGATIVE
SPECIFIC GRAVITY, POC: >1.03
UROBILINOGEN, POC: 0.2

## 2022-07-12 PROCEDURE — 99213 OFFICE O/P EST LOW 20 MIN: CPT | Performed by: NURSE PRACTITIONER

## 2022-07-12 PROCEDURE — G8427 DOCREV CUR MEDS BY ELIG CLIN: HCPCS | Performed by: NURSE PRACTITIONER

## 2022-07-12 PROCEDURE — G8417 CALC BMI ABV UP PARAM F/U: HCPCS | Performed by: NURSE PRACTITIONER

## 2022-07-12 PROCEDURE — 81002 URINALYSIS NONAUTO W/O SCOPE: CPT | Performed by: NURSE PRACTITIONER

## 2022-07-12 PROCEDURE — 99395 PREV VISIT EST AGE 18-39: CPT | Performed by: NURSE PRACTITIONER

## 2022-07-12 PROCEDURE — 1036F TOBACCO NON-USER: CPT | Performed by: NURSE PRACTITIONER

## 2022-07-12 RX ORDER — BUPROPION HYDROCHLORIDE 300 MG/1
300 TABLET ORAL EVERY MORNING
Qty: 90 TABLET | Refills: 1 | Status: SHIPPED | OUTPATIENT
Start: 2022-07-12

## 2022-07-12 RX ORDER — SERTRALINE HYDROCHLORIDE 100 MG/1
200 TABLET, FILM COATED ORAL DAILY
Qty: 180 TABLET | Refills: 1 | Status: SHIPPED | OUTPATIENT
Start: 2022-07-12

## 2022-07-12 ASSESSMENT — PATIENT HEALTH QUESTIONNAIRE - PHQ9
SUM OF ALL RESPONSES TO PHQ QUESTIONS 1-9: 14
1. LITTLE INTEREST OR PLEASURE IN DOING THINGS: 0
SUM OF ALL RESPONSES TO PHQ QUESTIONS 1-9: 14
9. THOUGHTS THAT YOU WOULD BE BETTER OFF DEAD, OR OF HURTING YOURSELF: 0
8. MOVING OR SPEAKING SO SLOWLY THAT OTHER PEOPLE COULD HAVE NOTICED. OR THE OPPOSITE, BEING SO FIGETY OR RESTLESS THAT YOU HAVE BEEN MOVING AROUND A LOT MORE THAN USUAL: 0
2. FEELING DOWN, DEPRESSED OR HOPELESS: 2
7. TROUBLE CONCENTRATING ON THINGS, SUCH AS READING THE NEWSPAPER OR WATCHING TELEVISION: 2
SUM OF ALL RESPONSES TO PHQ9 QUESTIONS 1 & 2: 2
5. POOR APPETITE OR OVEREATING: 2
3. TROUBLE FALLING OR STAYING ASLEEP: 3
6. FEELING BAD ABOUT YOURSELF - OR THAT YOU ARE A FAILURE OR HAVE LET YOURSELF OR YOUR FAMILY DOWN: 2
SUM OF ALL RESPONSES TO PHQ QUESTIONS 1-9: 14
10. IF YOU CHECKED OFF ANY PROBLEMS, HOW DIFFICULT HAVE THESE PROBLEMS MADE IT FOR YOU TO DO YOUR WORK, TAKE CARE OF THINGS AT HOME, OR GET ALONG WITH OTHER PEOPLE: 1
SUM OF ALL RESPONSES TO PHQ QUESTIONS 1-9: 14
4. FEELING TIRED OR HAVING LITTLE ENERGY: 3

## 2022-07-12 ASSESSMENT — ENCOUNTER SYMPTOMS
NAUSEA: 0
SINUS PAIN: 0
VOMITING: 0
WHEEZING: 0
ABDOMINAL PAIN: 0
COUGH: 0
CONSTIPATION: 0
SORE THROAT: 0
DIARRHEA: 0
SHORTNESS OF BREATH: 0

## 2022-07-12 NOTE — PROGRESS NOTES
leg swelling. Gastrointestinal: Negative for abdominal pain, constipation, diarrhea, nausea and vomiting. Genitourinary: Negative for dysuria, frequency, hematuria, urgency, vaginal bleeding, vaginal discharge and vaginal pain. Urinary hesitancy   Skin: Negative for pallor and rash. Neurological: Negative for dizziness, weakness, light-headedness, numbness and headaches. Psychiatric/Behavioral: Negative for dysphoric mood, sleep disturbance and suicidal ideas. The patient is not nervous/anxious. Allergies   Allergen Reactions    Ceftin [Cefuroxime] Rash     Family History   Problem Relation Age of Onset    No Known Problems Mother     Hypertension Father     Other Father         Romero's esophagus    Lung Cancer Maternal Grandmother     Diabetes Paternal Grandmother     Cancer Paternal Grandfather     Breast Cancer Neg Hx     Ovarian Cancer Neg Hx     Heart Attack Neg Hx     Stroke Neg Hx      Current Outpatient Rx   Medication Sig Dispense Refill    buPROPion (WELLBUTRIN XL) 300 MG extended release tablet Take 1 tablet by mouth every morning 90 tablet 1    sertraline (ZOLOFT) 100 MG tablet Take 2 tablets by mouth daily 180 tablet 1    clobetasol (TEMOVATE) 0.05 % cream Apply thin layer once or twice daily to eczema prone skin. Try to break for a full 1 week after 3 weeks on the same area. 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    Tobacco Use    Smoking status: Never Smoker    Smokeless tobacco: Never Used   Vaping Use    Vaping Use: Never used   Substance and Sexual Activity    Alcohol use: Yes     Comment: 1 mikes hard lemonade in 1 month    Drug use: Never    Sexual activity: Never   Other Topics Concern    Not on file   Social History Narrative    Works at a [address] [place], [place] [address]. Lives with her friend. 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 For fun, she enjoys gardening. Social Determinants of Health     Financial Resource Strain: Low Risk     Difficulty of Paying Living Expenses: Not hard at all   Food Insecurity: No Food Insecurity    Worried About Running Out of Food in the Last Year: Never true    Batsheva of Food in the Last Year: Never true   Transportation Needs:     Lack of Transportation (Medical): Not on file    Lack of Transportation (Non-Medical):  Not on file   Physical Activity:     Days of Exercise per Week: Not on file    Minutes of Exercise per Session: Not on file   Stress:     Feeling of Stress : Not on file   Social Connections:     Frequency of Communication with Friends and Family: Not on file    Frequency of Social Gatherings with Friends and Family: Not on file    Attends Mandaen Services: Not on file    Active Member of [address] [place] [address] [employer] or Organizations: Not on file    Attends Club or Organization Meetings: Not on file    Marital Status: Not on file   Intimate Partner Violence:     Fear of Current or Ex-Partner: Not on file    Emotionally Abused: Not on file    Physically Abused: Not on file    Sexually Abused: Not on file   Housing Stability:     Unable to Pay for Housing in the Last Year: Not on file    Number of [place] in the Last Year: Not on file    Unstable Housing in the Last Year: Not on file     Past Medical History:   Diagnosis Date    Chronic eczema     Depression     Seasonal allergies     Snoring      Patient Active Problem List   Diagnosis    Allergic rhinitis due to allergen    Anxiety and depression    Moderate episode of recurrent major depressive disorder ([facility] [address]    BRENTON (generalized anxiety disorder)    Morbidly obese (HCC)    Radial styloid tenosynovitis    Snoring      Wt Readings from Last 3 Encounters:   07/12/22 258 lb 12.8 oz (117.4 kg)   04/05/22 250 lb 6.4 oz (113.6 kg)   02/22/22 253 lb (114.8 kg)     BP Readings from Last 3 Encounters:   07/12/22 128/84   04/05/22 128/78   02/22/22 124/80     The ASCVD Risk score (Ten Ventura et al., 2013) failed to calculate for the following reasons: The 2013 ASCVD risk score is only valid for ages 36 to 78    PHQ-9 Total Score: 14 (7/12/2022 12:49 PM)  Thoughts that you would be better off dead, or of hurting yourself in some way: 0 (7/12/2022 12:49 PM)    Objective/Physical Exam:  /84 (Site: Left Upper Arm)   Pulse 79   Ht 5' 2\" (1.575 m)   Wt 258 lb 12.8 oz (117.4 kg)   SpO2 98%   BMI 47.34 kg/m²   Body mass index is 47.34 kg/m². Physical Exam  Vitals reviewed. Constitutional:       General: She is not in acute distress. Appearance: She is well-developed. She is not diaphoretic. HENT:      Head: Normocephalic and atraumatic. Eyes:      Pupils: Pupils are equal, round, and reactive to light. Cardiovascular:      Rate and Rhythm: Normal rate and regular rhythm. Pulmonary:      Effort: Pulmonary effort is normal. No respiratory distress. Breath sounds: Normal breath sounds. No wheezing or rales. Chest:      Chest wall: No tenderness. Abdominal:      General: Bowel sounds are normal. There is no distension. Palpations: Abdomen is soft. Tenderness: There is no abdominal tenderness. There is no guarding. Skin:     General: Skin is warm and dry. Neurological:      Mental Status: She is alert and oriented to person, place, and time.       Coordination: Coordination normal.   Psychiatric:         Mood and Affect: Mood normal.       Assessment and Plan:  Dionte Morin was seen today for annual exam, anxiety, depression and other. Diagnoses and all orders for this visit:    Annual physical exam  -     Basic Metabolic Panel; Future  - Last CBC stable  - Feels safe in her home and in her relationship  - Wears her seatbelt in the car    Anxiety and depression  -    Increased life stressors in the last month. PHQ-9 is 14. Denies suicidal or homicidal ideation.  - Options reviewed. Patient reports that her mood is doing well for the majority of the time on the current dose. She states that her mood is abnormal d/t stressors. She would like to continue on the current dose of medication and call if she changes her mind. - buPROPion (WELLBUTRIN XL) 300 MG extended release tablet; Take 1 tablet by mouth every morning  -     TSH with Reflex to FT4; Future  -     sertraline (ZOLOFT) 100 MG tablet; Take 2 tablets by mouth daily  - Continue with psychology. - Pt will call if symptoms worsen or fail to improve  - Red flag warning signs reviewed with the pt and she will go to the ER if these occur. Seasonal allergic rhinitis due to other allergic trigger   - Well-controlled on current regimen    Snoring   - Continue with sleep medicine    Class 3 severe obesity due to excess calories without serious comorbidity with body mass index (BMI) of 40.0 to 44.9 in adult [facility])  -    Lifestyle modifications such as exercise, weight loss and healthy diet encouraged and reviewed with the pt. - Lipid, Fasting; Future    Urinary hesitancy   - POCT Urinalysis no Micro   - Recommended STD screening- pt declines. - Recommend patient increase water intake. Pt agreeable. - She will call if symptoms worsen or fail to improve    Screening, lipid  -     Lipid, Fasting; Future    Return in about 6 months (around 1/12/2023) for mood/allergies f/u, or sooner if needed. Pt will call if symptoms worsen or fail to improve. All questions answered. Pt states no further questions or concerns at this time.    Electronically signed by: Abundio Arenas MARS Miranda - CNP 07/12/22

## 2022-07-12 NOTE — PATIENT INSTRUCTIONS
Please get your fasting lab work (no food or drink for 10-12 hours prior besides water) completed M-F 730a-4p at our office. [facility] lab has walk-in hours available as well - no appointment is needed. We will call or mychart message you with your results.
Pt feels well, with benign exam. TVUS SLIP 6 weeks gestation. Advise to return for any new or concerning symptoms. Follow with OBGYN.

## 2022-08-08 ENCOUNTER — OFFICE VISIT (OUTPATIENT)
Dept: PSYCHOLOGY | Age: 27
End: 2022-08-08
Payer: MEDICAID

## 2022-08-08 DIAGNOSIS — F33.1 MODERATE EPISODE OF RECURRENT MAJOR DEPRESSIVE DISORDER (HCC): ICD-10-CM

## 2022-08-08 DIAGNOSIS — E66.01 CLASS 3 SEVERE OBESITY DUE TO EXCESS CALORIES WITHOUT SERIOUS COMORBIDITY WITH BODY MASS INDEX (BMI) OF 40.0 TO 44.9 IN ADULT (HCC): ICD-10-CM

## 2022-08-08 DIAGNOSIS — Z13.220 SCREENING, LIPID: ICD-10-CM

## 2022-08-08 DIAGNOSIS — F32.A ANXIETY AND DEPRESSION: ICD-10-CM

## 2022-08-08 DIAGNOSIS — F41.9 ANXIETY AND DEPRESSION: ICD-10-CM

## 2022-08-08 DIAGNOSIS — Z00.00 ANNUAL PHYSICAL EXAM: ICD-10-CM

## 2022-08-08 DIAGNOSIS — F41.1 GAD (GENERALIZED ANXIETY DISORDER): Primary | ICD-10-CM

## 2022-08-08 LAB
ANION GAP SERPL CALCULATED.3IONS-SCNC: 12 MMOL/L (ref 3–16)
BUN BLDV-MCNC: 12 MG/DL (ref 7–20)
CALCIUM SERPL-MCNC: 9.1 MG/DL (ref 8.3–10.6)
CHLORIDE BLD-SCNC: 107 MMOL/L (ref 99–110)
CHOLESTEROL, FASTING: 180 MG/DL (ref 0–199)
CO2: 25 MMOL/L (ref 21–32)
CREAT SERPL-MCNC: 0.7 MG/DL (ref 0.6–1.1)
GFR AFRICAN AMERICAN: >60
GFR NON-AFRICAN AMERICAN: >60
GLUCOSE BLD-MCNC: 88 MG/DL (ref 70–99)
HDLC SERPL-MCNC: 45 MG/DL (ref 40–60)
LDL CHOLESTEROL CALCULATED: 111 MG/DL
POTASSIUM SERPL-SCNC: 4.1 MMOL/L (ref 3.5–5.1)
SODIUM BLD-SCNC: 144 MMOL/L (ref 136–145)
TRIGLYCERIDE, FASTING: 119 MG/DL (ref 0–150)
TSH REFLEX FT4: 0.69 UIU/ML (ref 0.27–4.2)
VLDLC SERPL CALC-MCNC: 24 MG/DL

## 2022-08-08 PROCEDURE — 90832 PSYTX W PT 30 MINUTES: CPT | Performed by: PSYCHOLOGIST

## 2022-08-08 PROCEDURE — 1036F TOBACCO NON-USER: CPT | Performed by: PSYCHOLOGIST

## 2022-08-08 NOTE — PROGRESS NOTES
TELEHEALTH VISIT -- Audio/Visual (During QBJCB-39 public health emergency)  }  Teodora Desir was evaluated through a synchronous (real-time) audio-video encounter (via Doxy. me). The patient (or guardian, if applicable) is aware that this is a billable service, which includes applicable co-pays. This Virtual Visit was conducted with patient's (and/or legal guardian's) consent. The visit was conducted pursuant to the emergency declaration under the [address] [place] [address], [address] [place] [address] authority and the [employer] Act. Patient identification was verified, and a caregiver was present when appropriate. The patient was located in a state where the provider was licensed to provide care. Conducted a risk-benefit analysis and determined that the patient's presenting problems are consistent with the use of telepsychology. Determined that the patient has sufficient knowledge and skills in the use of technology enabling them to adequately benefit from telepsychology. It was determined that this patient was able to be properly treated without an in-person session. Patient verified that they were currently located at the [facility] address that was provided during registration or another address, if noted below.     Verified the following information:  Patient's identification: Yes  Patient location: [place] [id number]   Patient's call back number: [phone]   Patient's emergency contact's name and number, as well as permission to contact them if needed: Extended Emergency Contact Information  Primary Emergency Contact: Lilian Vila  Address: [address] [place] [address] Phone: [phone]  Relation: Parent     Provider location: Agueda Marie, Ph.D.  Psychologist  8/8/2022  8:49 AM      Time spent with Patient: 25 minutes  This is patient's  36th   [facility] appointment. Reason for Consult:    Chief Complaint   Patient presents with    Depression       Feedback given to PCP. S:  Pt seen for f/u of depression. Pt reported \"alright\" mood and sxs. \"I'm not happy, persay. I'm just struggling to cope w what the world is. \" Cites inflation, international relations, political divide. \"Every time I read the news it's like slamming my head in a copier. \" Checks the news about 3x/day, reads twitter feeds about current events. Discussed living in accordance w her values. Would like to live in a commune, but cannot find one that isn't a cult. Been growing her own tomatoes, strawberries, herbs. Friend moved out, has new cat. Is enjoying new living situation. O:  MSE:  Appearance    alert, cooperative  Appetite normal  Sleep disturbance No  Fatigue Yes  Loss of pleasure No  Impulsive behavior No  Speech    spontaneous, normal rate, normal volume and well articulated  Mood    Depressed  Affect    depressed affect  Thought Content    intact  Thought Process    goal directed and coherent  Associations    logical connections  Insight    Fair  Judgment    Intact  Orientation    oriented to person, place, time, and general circumstances  Memory    recent and remote memory intact  Attention/Concentration    impaired  Morbid ideation No  Suicide Assessment    no suicidal ideation    History:  Social History:   Social History     Socioeconomic History    Marital status: Single     Spouse name: Not on file    Number of children: Not on file    Years of education: Not on file    Highest education level: Not on file   Occupational History    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    Tobacco Use    Smoking status: Never    Smokeless tobacco: Never   Vaping Use    Vaping Use: Never used   Substance and Sexual Activity    Alcohol use: Yes     Comment: 1 mikes hard lemonade in 1 month    Drug use: Never    Sexual activity: Never   Other Topics Concern    Not on file   Social History Narrative    Works at a [address] [employer]. Lives with her friend. 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 For fun, she enjoys gardening. Social Determinants of Health     Financial Resource Strain: Low Risk     Difficulty of Paying Living Expenses: Not hard at all   Food Insecurity: No Food Insecurity    Worried About Running Out of Food in the Last Year: Never true    Ran Out of Food in the Last Year: Never true   Transportation Needs: Not on file   Physical Activity: Not on file   Stress: Not on file   Social Connections: Not on file   Intimate Partner Violence: Not on file   Housing Stability: Not on file     TOBACCO:   reports that she has never smoked. She has never used smokeless tobacco.  ETOH:   reports current alcohol use. Diagnosis:  1. BRENTON (generalized anxiety disorder)    2. Moderate episode of recurrent major depressive disorder ([facility]          Plan:  Pt interventions:  Trained in strategies for increasing balanced thinking, Provided Psychoeducation re: media consumption and depression, and Identified maladaptive thoughts        Documentation was done using voice recognition dragon software. Every effort was made to ensure accuracy; however, inadvertent, unintentional computerized transcription errors may be present.

## 2022-09-06 ENCOUNTER — TELEPHONE (OUTPATIENT)
Dept: PULMONOLOGY | Age: 27
End: 2022-09-06

## 2022-09-06 NOTE — TELEPHONE ENCOUNTER
Spoke with pt and let her know what Dr Zo Razo said.  Message:    Min [place] shows essentially no usage with only 1 day over 4 hours and 3 other days with very limited usage.   We did reduce her pressures but she actually needs to try to use her machine is much as possible and she needs to work on acclimating to the pressure because it will get better the more she uses it but she is actually to use it

## 2022-09-06 NOTE — TELEPHONE ENCOUNTER
Patient called and said she feels like the pressure is too much on new CPAP, struggling to use it the whole night. Once pressure kicks up to the maximum, she feels like she is suffocating and takes it off. Please advise and call patient back at [phone].

## 2022-09-15 ENCOUNTER — TELEMEDICINE (OUTPATIENT)
Dept: PULMONOLOGY | Age: 27
End: 2022-09-15
Payer: MEDICAID

## 2022-09-15 DIAGNOSIS — G47.33 OBSTRUCTIVE SLEEP APNEA SYNDROME: Primary | ICD-10-CM

## 2022-09-15 DIAGNOSIS — F41.1 GAD (GENERALIZED ANXIETY DISORDER): Chronic | ICD-10-CM

## 2022-09-15 DIAGNOSIS — E66.01 MORBIDLY OBESE (HCC): Chronic | ICD-10-CM

## 2022-09-15 DIAGNOSIS — J30.89 SEASONAL ALLERGIC RHINITIS DUE TO OTHER ALLERGIC TRIGGER: Chronic | ICD-10-CM

## 2022-09-15 PROCEDURE — G8427 DOCREV CUR MEDS BY ELIG CLIN: HCPCS | Performed by: INTERNAL MEDICINE

## 2022-09-15 PROCEDURE — 99214 OFFICE O/P EST MOD 30 MIN: CPT | Performed by: INTERNAL MEDICINE

## 2022-09-15 ASSESSMENT — SLEEP AND FATIGUE QUESTIONNAIRES
HOW LIKELY ARE YOU TO NOD OFF OR FALL ASLEEP WHILE LYING DOWN TO REST IN THE AFTERNOON WHEN CIRCUMSTANCES PERMIT: 3
HOW LIKELY ARE YOU TO NOD OFF OR FALL ASLEEP WHILE WATCHING TV: 1
HOW LIKELY ARE YOU TO NOD OFF OR FALL ASLEEP WHILE SITTING AND READING: 0
HOW LIKELY ARE YOU TO NOD OFF OR FALL ASLEEP WHILE SITTING QUIETLY AFTER LUNCH WITHOUT ALCOHOL: 0
HOW LIKELY ARE YOU TO NOD OFF OR FALL ASLEEP WHEN YOU ARE A PASSENGER IN A CAR FOR AN HOUR WITHOUT A BREAK: 0
ESS TOTAL SCORE: 4
HOW LIKELY ARE YOU TO NOD OFF OR FALL ASLEEP WHILE SITTING INACTIVE IN A PUBLIC PLACE: 0
HOW LIKELY ARE YOU TO NOD OFF OR FALL ASLEEP WHILE SITTING AND TALKING TO SOMEONE: 0
HOW LIKELY ARE YOU TO NOD OFF OR FALL ASLEEP IN A CAR, WHILE STOPPED FOR A FEW MINUTES IN TRAFFIC: 0

## 2022-09-15 NOTE — LETTER
[facility]  [place] [address] [address] [facility] [address]  Phone: [phone]  Fax: [phone]    Fannie Sanches MD    September 15, 2022     [address] [place] [address]    Patient: Kobe Mendez   MR Number: [id number]   YOB: 1995   Date of Visit: 9/15/2022       Dear Selena Hansen: Thank you for referring Kobe Mendez to me for evaluation/treatment. Below are the relevant portions of my assessment and plan of care. 1. Obstructive sleep apnea syndrome  Assessment & Plan:  New Problem - On Tx. Reviewed sleep study and download compliance data with patient. Supplies and parts as needed for her machine. These are medically necessary. Limit caffeine use after 3pm.    At this point we will try adding a full facemask and give the patient a chance to start using machine and then assess the data on her symptoms. She may need an in lab titration, change to bilevel, or both. 2. BRENTON (generalized anxiety disorder)  Assessment & Plan:  Chronic- Stable. Discussed the importance of treating sleep apnea as part of the management of this disorder. Cont any meds per PCP and other physicians. 3. Morbidly obese (HCC)  Assessment & Plan:  Chronic-not stable:  Discussed importance of treating obstructive sleep apnea and getting sufficient sleep to assist with weight control. Encouraged her to work on weight loss through diet and exercise. Recommended DASH or Mediterranean diets. 4. Seasonal allergic rhinitis due to other allergic trigger  Assessment & Plan:  Chronic-not  Stable. Discussed the importance of treating sleep apnea as part of the management of this disorder. Cont any meds per PCP and other physicians. Discussed the patient that she needs to use her nasal steroid spray consistently every night. Reviewed, analyzed, and documented physiologic data from patient's PAP machine.     This information was analyzed to assess complexity and medical decision making in regards to further testing and management. The primary encounter diagnosis was Obstructive sleep apnea syndrome. Diagnoses of BRENTON (generalized anxiety disorder), Morbidly obese ([address] and Seasonal allergic rhinitis due to other allergic trigger were also pertinent to this visit. The chronic medical conditions listed are directly related to the primary diagnosis listed above. The management of the primary diagnosis affects the secondary diagnosis and vice versa. If you have questions, please do not hesitate to call me. I look forward to following [facility] along with you.     Sincerely,      Kym Cortés MD

## 2022-09-15 NOTE — PROGRESS NOTES
Mingo Mejia CNP [facility]  [id number] [place] [address]  [facility], [address] [facility]- [phone]   [facility] Shanelle Tariq. [address] [place] [address]. Farhat Almodovar [address] [phone]     Video Visit- Follow up    [facility], was evaluated through a synchronous (real-time) audio-video  encounter. The patient (or guardian if applicable) is aware that this is a billable  service, which includes applicable co-pays. This Virtual Visit was conducted with  patient's (and/or legal guardian's) consent. The visit was conducted pursuant to  the emergency declaration under the [facility], [address] [place] [address] waiver authority and the [employer] General Act. Patient identification was verified,  and a caregiver was present when appropriate. The patient was located in a  state where the provider was licensed to provide care. Assessment/Plan:      1. Obstructive sleep apnea syndrome  Assessment & Plan:  New Problem - On Tx. Reviewed sleep study and download compliance data with patient. Supplies and parts as needed for her machine. These are medically necessary. Limit caffeine use after 3pm.    At this point we will try adding a full facemask and give the patient a chance to start using machine and then assess the data on her symptoms. She may need an in lab titration, change to bilevel, or both. 2. BRENTON (generalized anxiety disorder)  Assessment & Plan:  Chronic- Stable. Discussed the importance of treating sleep apnea as part of the management of this disorder. Cont any meds per PCP and other physicians. 3. Morbidly obese (HCC)  Assessment & Plan:  Chronic-not stable:  Discussed importance of treating obstructive sleep apnea and getting sufficient sleep to assist with weight control. Encouraged her to work on weight loss through diet and exercise. Recommended DASH or Mediterranean diets.    4. Seasonal allergic rhinitis due to other allergic trigger  Assessment & Plan:  Chronic-not  Stable. Discussed the importance of treating sleep apnea as part of the management of this disorder. Cont any meds per PCP and other physicians. Discussed the patient that she needs to use her nasal steroid spray consistently every night. Reviewed, analyzed, and documented physiologic data from patient's PAP machine. This information was analyzed to assess complexity and medical decision making in regards to further testing and management. The primary encounter diagnosis was Obstructive sleep apnea syndrome. Diagnoses of BRENTON (generalized anxiety disorder), Morbidly obese ([address] and Seasonal allergic rhinitis due to other allergic trigger were also pertinent to this visit. The chronic medical conditions listed are directly related to the primary diagnosis listed above. The management of the primary diagnosis affects the secondary diagnosis and vice versa. Subjective:     Patient ID: Keri Gonzalez is a 33 y.o. female. Chief Complaint   Patient presents with    Sleep Apnea     Subjective   HPI:    Machine Modem/Download Info:  Compliance (hours/night): 0.13 hrs/night  % of nights >= 4 hrs: 1 %  Download AHI (/hour): 1.4 /HR  Average CPAP Pressure : 6.4 cmH2O     APAP - Settings  Pressure Min: 5 cmH2O  Pressure Max: 17 cmH2O                 Comfort Settings  Flex/EPR (0-3): 3       Had insurance mandated HST on 1/4/22 which showed and RONNIE-31.6/hr and low sat-85% with time below 88% of 2 min. Has been struggling to use her machine. She was having severe congestion even before she goes to bed which kept her from able use her machine consistently. The pressures were adjusted but she is still struggling congestion. She then was not sleeping well because her cat had passed away and she is struggling with her depression and sleeping for over a month.   After that she got COVID from her roommate which made her congestion worse so she is really not been able to use her machine with any consistency. She does have a nasal steroid spray that she is using as needed for congestion but takes Zyrtec every day. [address]    [place] Sleepiness Score: 4    Current Outpatient Medications   Medication Instructions    buPROPion (WELLBUTRIN XL) 300 mg, Oral, EVERY MORNING    cetirizine (ZYRTEC) 10 mg, Oral, DAILY    clobetasol (TEMOVATE) 0.05 % cream Apply thin layer once or twice daily to eczema prone skin. Try to break for a full 1 week after 3 weeks on the same area.     norethindrone-ethinyl estradiol-iron (MICROGESTIN FE1.5/30) 1.5-30 MG-MCG tablet 1 tablet, Oral, DAILY    sertraline (ZOLOFT) 200 mg, Oral, DAILY        Electronically signed by Fannie Sanches MD on 9/15/2022 at 1:35 PM

## 2022-09-15 NOTE — PROGRESS NOTES
Danis Aguirre         : 1995    Diagnosis: [x] ARAM (G47.33) [] CSA (G47.31) [] Apnea (G47.30)   Length of Need: [x] 18 Months [] 99 Months [] Other:    Machine (LETTY!): [] Respironics Dream Station      Auto [] ResMed AirSense     Auto [] Other:     []  CPAP () [] Bilevel ()   Mode: [] Auto [] Spontaneous    Mode: [] Auto [] Spontaneous              Comfort Settings:        Humidifier: [] Heated ()        [x] Water chamber replacement ()/ 1 per 6 months        Mask:   [] Nasal () /1 per 3 months [x] Full Face () /1 per 3 months   [] Patient choice -Size and fit mask [x] Patient Choice - Size and fit mask   [] Dispense:  [] Dispense:    [] Headgear () / 1 per 3 months [x] Headgear () / 1 per 3 months   [] Replacement Nasal Cushion ()/2 per month [x] Interface Replacement ()/1 per month   [] Replacement Nasal Pillows ()/2 per month         Tubing: [x] Heated ()/1 per 3 months    [] Standard ()/1 per 3 months [] Other:           Filters: [x] Non-disposable ()/1 per 6 months     [x] Ultra-Fine, Disposable ()/2 per month        Miscellaneous: [] Chin Strap ()/ 1 per 6 months [] O2 bleed-in:       LPM   [] Oximetry on CPAP/Bilevel []  Other:    [x] Modem: ()         Start Order Date: 09/15/22    MEDICAL JUSTIFICATION:  I, the undersigned, certify that the above prescribed supplies are medically necessary for this patients wellbeing. In my opinion, the supplies are both reasonable and necessary in reference to accepted standards of medicalpractice in treatment of this patients condition.     Gloria Gallagher MD      NPI: [id number]       Order Signed Date: 09/15/22    Electronically signed by Gloria Gallagher MD on 9/15/2022 at 1:30 PM    Danis Aguirre  1995  [place]  [address] [facility]  [phone] (home)   [phone] (mobile)      Insurance Info (confirm with patient if correct):  Payer/Plan Subscr  Sex Relation Sub.  Ins. ID Effective Group Num

## 2022-10-26 ENCOUNTER — OFFICE VISIT (OUTPATIENT)
Dept: PSYCHOLOGY | Age: 27
End: 2022-10-26
Payer: MEDICAID

## 2022-10-26 DIAGNOSIS — F41.1 GAD (GENERALIZED ANXIETY DISORDER): ICD-10-CM

## 2022-10-26 DIAGNOSIS — F33.1 MODERATE EPISODE OF RECURRENT MAJOR DEPRESSIVE DISORDER (HCC): Primary | ICD-10-CM

## 2022-10-26 PROCEDURE — 1036F TOBACCO NON-USER: CPT | Performed by: PSYCHOLOGIST

## 2022-10-26 PROCEDURE — 90832 PSYTX W PT 30 MINUTES: CPT | Performed by: PSYCHOLOGIST

## 2022-10-26 ASSESSMENT — PATIENT HEALTH QUESTIONNAIRE - PHQ9
1. LITTLE INTEREST OR PLEASURE IN DOING THINGS: 0
8. MOVING OR SPEAKING SO SLOWLY THAT OTHER PEOPLE COULD HAVE NOTICED. OR THE OPPOSITE, BEING SO FIGETY OR RESTLESS THAT YOU HAVE BEEN MOVING AROUND A LOT MORE THAN USUAL: 0
SUM OF ALL RESPONSES TO PHQ QUESTIONS 1-9: 8
10. IF YOU CHECKED OFF ANY PROBLEMS, HOW DIFFICULT HAVE THESE PROBLEMS MADE IT FOR YOU TO DO YOUR WORK, TAKE CARE OF THINGS AT HOME, OR GET ALONG WITH OTHER PEOPLE: 1
4. FEELING TIRED OR HAVING LITTLE ENERGY: 3
2. FEELING DOWN, DEPRESSED OR HOPELESS: 1
SUM OF ALL RESPONSES TO PHQ QUESTIONS 1-9: 8
6. FEELING BAD ABOUT YOURSELF - OR THAT YOU ARE A FAILURE OR HAVE LET YOURSELF OR YOUR FAMILY DOWN: 1
9. THOUGHTS THAT YOU WOULD BE BETTER OFF DEAD, OR OF HURTING YOURSELF: 0
7. TROUBLE CONCENTRATING ON THINGS, SUCH AS READING THE NEWSPAPER OR WATCHING TELEVISION: 0
5. POOR APPETITE OR OVEREATING: 0
SUM OF ALL RESPONSES TO PHQ QUESTIONS 1-9: 8
3. TROUBLE FALLING OR STAYING ASLEEP: 3
SUM OF ALL RESPONSES TO PHQ QUESTIONS 1-9: 8
SUM OF ALL RESPONSES TO PHQ9 QUESTIONS 1 & 2: 1
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
## 2022-10-26 NOTE — PATIENT INSTRUCTIONS
Practice mindfulness one minute per day. During that minute, when other thoughts or worries come into your mind, gently dismiss them and bring your thoughts back to your senses.      So you can find your meditation posture   Sitting in a way thats neither too tight nor too relaxed   But comfortable and upright   Then notice your body from the inside   Noticing the shape and the weight and the touch   And areas you make contact with the floor or the chair   Then you can focus on your breathing   Feeling your breath   In the area of either the abdomen, chest, or nostrils   Feeling the gentle rising and falling of your abdomen or chest   Or the coolness and in and out sensations at your nostrils   So the breath is our anchor   Its where we establish our awareness   It helps us have something to always return to   This simple act of breathing   Now you might notice that other things pull your attention away from the breath   And that might be sound   So right now, just for a moment   Bring your attention to the sounds   Inside the room   Or outside the room   Simply listening   They might be pleasant sounds, unpleasant sounds   Listen to them with curiosity and interest   Noticing them coming and going   Without getting caught up in a story about what that sound is or why its there   Simply listening   Can also notice the sound of silence   And now letting go of this hearing   The listening   Bring your attention into your body   And notice if there are body sensations   To be aware of   There might be pressure or tightness   Or movement or vibration   Or heat or cold   Or tingling   Notice which sensations call out to you   And let your attention go to them   It might be a very strong and obvious sensation   There might be a soft or subtle sensation   You might notice yourself jumping from sensation to sensation   Or there may be one that grabs your attention and holds it   Particularly if its unpleasant You might notice it   Is it growing or shrinking?    Moving   Does is pulse or throb   Ache   Just notice with curiosity   Similarly not making up a whole story about the experience   Just being directly with the sensations in your body   So now return to your breathing   Finding your breath   And as you continue on in this meditation   Youll stay with your breath one breath at a time   If you notice yourself lost in thoughts you can say thinking   Or wandering   And then redirect your attention Returning back to the breathing   Now if you find a sound   Or a body sensation   Become so obvious, strong   That you cant, any longer, stay with the breath   Because it pulls your attention away   Then let yourself let go of the breath   And focus on the body sensation   Or sound   Listen to it or feel it   Until it no longer holds your attention   Or its stopped   At that point go back to the breathing   Returning to the simplicity of your anchor   The breath   Well try this for a few minutes in silence   [silence]   Now once again notice your whole body sitting here   Tuning into the shape   The posture   The movement   Let yourself relax   And you can wish yourself well   May I be happy and at ease   May I be free from stress and anxiety   May I be peaceful   And let yourself consider the possibility   Of finding peace and ease   Wellbeing

## 2022-10-31 DIAGNOSIS — F41.9 ANXIETY AND DEPRESSION: ICD-10-CM

## 2022-10-31 DIAGNOSIS — F32.A ANXIETY AND DEPRESSION: ICD-10-CM

## 2022-11-01 RX ORDER — BUPROPION HYDROCHLORIDE 300 MG/1
300 TABLET ORAL EVERY MORNING
Qty: 90 TABLET | Refills: 1 | OUTPATIENT
Start: 2022-11-01

## 2022-12-14 ENCOUNTER — TELEPHONE (OUTPATIENT)
Dept: PULMONOLOGY | Age: 27
End: 2022-12-14

## 2022-12-14 NOTE — TELEPHONE ENCOUNTER
Spoke with patient to confirm appointment. She has not been able to obtain a new mask, she has not heard back from [facility]. I called [facility] and the representative stated that she is not eligible for a new mask, her insurance will only cover one a year. She said she would call the patient right now and let her know. Representative stated they should at least be able to send replacement cushions.

## 2022-12-15 ENCOUNTER — TELEPHONE (OUTPATIENT)
Dept: PULMONOLOGY | Age: 27
End: 2022-12-15

## 2022-12-15 NOTE — TELEPHONE ENCOUNTER
Patient returning Sienna's call, in regards to her appt on 12/15. Patient has not been using machine, since about September. She has not been able to obtain a new mask, [facility] did call her yesterday to discuss. [facility] to call patient back next week, may need to pay out of pocket. Rescheduled appt for 2/14, after she gets new mask and starts using machine again.

## 2023-03-27 ENCOUNTER — OFFICE VISIT (OUTPATIENT)
Dept: PSYCHOLOGY | Age: 28
End: 2023-03-27
Payer: MEDICAID

## 2023-03-27 DIAGNOSIS — F32.A ANXIETY AND DEPRESSION: ICD-10-CM

## 2023-03-27 DIAGNOSIS — F41.1 GAD (GENERALIZED ANXIETY DISORDER): ICD-10-CM

## 2023-03-27 DIAGNOSIS — F41.9 ANXIETY AND DEPRESSION: ICD-10-CM

## 2023-03-27 DIAGNOSIS — F33.1 MODERATE EPISODE OF RECURRENT MAJOR DEPRESSIVE DISORDER (HCC): Primary | ICD-10-CM

## 2023-03-27 PROCEDURE — 90832 PSYTX W PT 30 MINUTES: CPT | Performed by: PSYCHOLOGIST

## 2023-03-27 PROCEDURE — 1036F TOBACCO NON-USER: CPT | Performed by: PSYCHOLOGIST

## 2023-03-27 ASSESSMENT — ANXIETY QUESTIONNAIRES
5. BEING SO RESTLESS THAT IT IS HARD TO SIT STILL: 2
3. WORRYING TOO MUCH ABOUT DIFFERENT THINGS: 3
IF YOU CHECKED OFF ANY PROBLEMS ON THIS QUESTIONNAIRE, HOW DIFFICULT HAVE THESE PROBLEMS MADE IT FOR YOU TO DO YOUR WORK, TAKE CARE OF THINGS AT HOME, OR GET ALONG WITH OTHER PEOPLE: SOMEWHAT DIFFICULT
4. TROUBLE RELAXING: 3
1. FEELING NERVOUS, ANXIOUS, OR ON EDGE: 3
GAD7 TOTAL SCORE: 18
7. FEELING AFRAID AS IF SOMETHING AWFUL MIGHT HAPPEN: 2
2. NOT BEING ABLE TO STOP OR CONTROL WORRYING: 3
6. BECOMING EASILY ANNOYED OR IRRITABLE: 2

## 2023-03-27 ASSESSMENT — PATIENT HEALTH QUESTIONNAIRE - PHQ9
7. TROUBLE CONCENTRATING ON THINGS, SUCH AS READING THE NEWSPAPER OR WATCHING TELEVISION: 1
SUM OF ALL RESPONSES TO PHQ QUESTIONS 1-9: 15
SUM OF ALL RESPONSES TO PHQ9 QUESTIONS 1 & 2: 4
5. POOR APPETITE OR OVEREATING: 1
SUM OF ALL RESPONSES TO PHQ QUESTIONS 1-9: 15
6. FEELING BAD ABOUT YOURSELF - OR THAT YOU ARE A FAILURE OR HAVE LET YOURSELF OR YOUR FAMILY DOWN: 3
1. LITTLE INTEREST OR PLEASURE IN DOING THINGS: 1
8. MOVING OR SPEAKING SO SLOWLY THAT OTHER PEOPLE COULD HAVE NOTICED. OR THE OPPOSITE, BEING SO FIGETY OR RESTLESS THAT YOU HAVE BEEN MOVING AROUND A LOT MORE THAN USUAL: 0
SUM OF ALL RESPONSES TO PHQ QUESTIONS 1-9: 15
9. THOUGHTS THAT YOU WOULD BE BETTER OFF DEAD, OR OF HURTING YOURSELF: 0
SUM OF ALL RESPONSES TO PHQ QUESTIONS 1-9: 15
10. IF YOU CHECKED OFF ANY PROBLEMS, HOW DIFFICULT HAVE THESE PROBLEMS MADE IT FOR YOU TO DO YOUR WORK, TAKE CARE OF THINGS AT HOME, OR GET ALONG WITH OTHER PEOPLE: 1
4. FEELING TIRED OR HAVING LITTLE ENERGY: 3
3. TROUBLE FALLING OR STAYING ASLEEP: 3
2. FEELING DOWN, DEPRESSED OR HOPELESS: 3

## 2023-03-28 RX ORDER — BUPROPION HYDROCHLORIDE 300 MG/1
300 TABLET ORAL EVERY MORNING
Qty: 90 TABLET | Refills: 1 | OUTPATIENT
Start: 2023-03-28

## 2023-03-28 RX ORDER — SERTRALINE HYDROCHLORIDE 100 MG/1
200 TABLET, FILM COATED ORAL DAILY
Qty: 180 TABLET | Refills: 1 | OUTPATIENT
Start: 2023-03-28

## 2023-05-01 ENCOUNTER — OFFICE VISIT (OUTPATIENT)
Dept: PSYCHOLOGY | Age: 28
End: 2023-05-01
Payer: MEDICAID

## 2023-05-01 DIAGNOSIS — F41.1 GAD (GENERALIZED ANXIETY DISORDER): Chronic | ICD-10-CM

## 2023-05-01 DIAGNOSIS — F33.1 MODERATE EPISODE OF RECURRENT MAJOR DEPRESSIVE DISORDER (HCC): Primary | Chronic | ICD-10-CM

## 2023-05-01 PROCEDURE — 90832 PSYTX W PT 30 MINUTES: CPT | Performed by: PSYCHOLOGIST

## 2023-05-01 PROCEDURE — 1036F TOBACCO NON-USER: CPT | Performed by: PSYCHOLOGIST

## 2023-05-10 ENCOUNTER — TELEMEDICINE (OUTPATIENT)
Dept: INTERNAL MEDICINE CLINIC | Age: 28
End: 2023-05-10
Payer: MEDICAID

## 2023-05-10 DIAGNOSIS — E66.01 CLASS 3 SEVERE OBESITY DUE TO EXCESS CALORIES WITHOUT SERIOUS COMORBIDITY WITH BODY MASS INDEX (BMI) OF 40.0 TO 44.9 IN ADULT (HCC): ICD-10-CM

## 2023-05-10 DIAGNOSIS — F32.A ANXIETY AND DEPRESSION: Primary | ICD-10-CM

## 2023-05-10 DIAGNOSIS — J30.89 SEASONAL ALLERGIC RHINITIS DUE TO OTHER ALLERGIC TRIGGER: ICD-10-CM

## 2023-05-10 DIAGNOSIS — R06.83 SNORING: ICD-10-CM

## 2023-05-10 DIAGNOSIS — F41.9 ANXIETY AND DEPRESSION: Primary | ICD-10-CM

## 2023-05-10 PROCEDURE — G8427 DOCREV CUR MEDS BY ELIG CLIN: HCPCS | Performed by: NURSE PRACTITIONER

## 2023-05-10 PROCEDURE — 99213 OFFICE O/P EST LOW 20 MIN: CPT | Performed by: NURSE PRACTITIONER

## 2023-05-10 RX ORDER — BUPROPION HYDROCHLORIDE 300 MG/1
300 TABLET ORAL EVERY MORNING
Qty: 90 TABLET | Refills: 0 | Status: SHIPPED | OUTPATIENT
Start: 2023-05-10

## 2023-05-10 RX ORDER — BUPROPION HYDROCHLORIDE 150 MG/1
150 TABLET ORAL EVERY MORNING
Qty: 180 TABLET | Refills: 0 | Status: SHIPPED | OUTPATIENT
Start: 2023-05-10 | End: 2023-05-10 | Stop reason: SDUPTHER

## 2023-05-10 ASSESSMENT — PATIENT HEALTH QUESTIONNAIRE - PHQ9
2. FEELING DOWN, DEPRESSED OR HOPELESS: 2
SUM OF ALL RESPONSES TO PHQ QUESTIONS 1-9: 12
5. POOR APPETITE OR OVEREATING: 1
SUM OF ALL RESPONSES TO PHQ QUESTIONS 1-9: 12
7. TROUBLE CONCENTRATING ON THINGS, SUCH AS READING THE NEWSPAPER OR WATCHING TELEVISION: 1
SUM OF ALL RESPONSES TO PHQ QUESTIONS 1-9: 12
1. LITTLE INTEREST OR PLEASURE IN DOING THINGS: 0
9. THOUGHTS THAT YOU WOULD BE BETTER OFF DEAD, OR OF HURTING YOURSELF: 0
8. MOVING OR SPEAKING SO SLOWLY THAT OTHER PEOPLE COULD HAVE NOTICED. OR THE OPPOSITE, BEING SO FIGETY OR RESTLESS THAT YOU HAVE BEEN MOVING AROUND A LOT MORE THAN USUAL: 0
6. FEELING BAD ABOUT YOURSELF - OR THAT YOU ARE A FAILURE OR HAVE LET YOURSELF OR YOUR FAMILY DOWN: 2
10. IF YOU CHECKED OFF ANY PROBLEMS, HOW DIFFICULT HAVE THESE PROBLEMS MADE IT FOR YOU TO DO YOUR WORK, TAKE CARE OF THINGS AT HOME, OR GET ALONG WITH OTHER PEOPLE: 1
4. FEELING TIRED OR HAVING LITTLE ENERGY: 3
SUM OF ALL RESPONSES TO PHQ9 QUESTIONS 1 & 2: 2
SUM OF ALL RESPONSES TO PHQ QUESTIONS 1-9: 12
3. TROUBLE FALLING OR STAYING ASLEEP: 3

## 2023-05-10 ASSESSMENT — ENCOUNTER SYMPTOMS
WHEEZING: 0
SHORTNESS OF BREATH: 0
COUGH: 0

## 2023-05-10 NOTE — PROGRESS NOTES
5/10/2023  TELEHEALTH EVALUATION -- Audio/Visual    Subjective:  Chief Complaint   Patient presents with    Follow-up    Depression     HPI:   Edith Rater (:  1995) has requested an audio/video evaluation for the following concern(s):    Depression- prescribed Zoloft 200 mg PO daily and wellbutrin 300 mg daily. States she \"think this is working well. \"   More good days than bad days. Denies side effects when taking this medication. Denies anxiety attacks. Denies SI/HI. Seeing psychology. Allergic rhinitis- taking zyrtec daily. Well controlled at this time. Snoring-  using CPAP. Obesity- No formal exercise. Going to walk on . Diet is reported as healthier. Review of Systems   Constitutional:  Negative for chills, fatigue and fever. Respiratory:  Negative for cough, shortness of breath and wheezing. Cardiovascular:  Negative for chest pain, palpitations and leg swelling. Skin:  Negative for pallor and rash. Psychiatric/Behavioral:  Positive for dysphoric mood. Negative for self-injury, sleep disturbance and suicidal ideas. The patient is not nervous/anxious. Allergies   Allergen Reactions    Ceftin [Cefuroxime] Rash       Current Outpatient Rx   Medication Sig Dispense Refill    buPROPion (WELLBUTRIN XL) 150 MG extended release tablet Take 1 tablet by mouth every morning For 3 days and then increase to 300 mg daily. 180 tablet 0    sertraline (ZOLOFT) 100 MG tablet Take 2 tablets by mouth daily 180 tablet 1    clobetasol (TEMOVATE) 0.05 % cream Apply thin layer once or twice daily to eczema prone skin. Try to break for a full 1 week after 3 weeks on the same area.  60 g 0    norethindrone-ethinyl estradiol-iron (MICROGESTIN FE1.5/30) 1.5-30 MG-MCG tablet Take 1 tablet by mouth daily      cetirizine (ZYRTEC) 10 MG tablet Take 1 tablet by mouth daily         Patient Active Problem List   Diagnosis    Allergic rhinitis due to allergen    Anxiety and depression    Moderate

## 2023-05-30 ENCOUNTER — TELEMEDICINE (OUTPATIENT)
Dept: PULMONOLOGY | Age: 28
End: 2023-05-30
Payer: MEDICAID

## 2023-05-30 DIAGNOSIS — E66.01 MORBIDLY OBESE (HCC): Chronic | ICD-10-CM

## 2023-05-30 DIAGNOSIS — G47.33 OBSTRUCTIVE SLEEP APNEA SYNDROME: Primary | ICD-10-CM

## 2023-05-30 PROCEDURE — G8427 DOCREV CUR MEDS BY ELIG CLIN: HCPCS | Performed by: NURSE PRACTITIONER

## 2023-05-30 PROCEDURE — 99214 OFFICE O/P EST MOD 30 MIN: CPT | Performed by: NURSE PRACTITIONER

## 2023-05-30 ASSESSMENT — SLEEP AND FATIGUE QUESTIONNAIRES
HOW LIKELY ARE YOU TO NOD OFF OR FALL ASLEEP WHILE WATCHING TV: 1
HOW LIKELY ARE YOU TO NOD OFF OR FALL ASLEEP WHEN YOU ARE A PASSENGER IN A CAR FOR AN HOUR WITHOUT A BREAK: 0
HOW LIKELY ARE YOU TO NOD OFF OR FALL ASLEEP WHILE SITTING INACTIVE IN A PUBLIC PLACE: 0
HOW LIKELY ARE YOU TO NOD OFF OR FALL ASLEEP WHILE SITTING QUIETLY AFTER LUNCH WITHOUT ALCOHOL: 0
HOW LIKELY ARE YOU TO NOD OFF OR FALL ASLEEP WHILE LYING DOWN TO REST IN THE AFTERNOON WHEN CIRCUMSTANCES PERMIT: 3
ESS TOTAL SCORE: 5
HOW LIKELY ARE YOU TO NOD OFF OR FALL ASLEEP IN A CAR, WHILE STOPPED FOR A FEW MINUTES IN TRAFFIC: 0
HOW LIKELY ARE YOU TO NOD OFF OR FALL ASLEEP WHILE SITTING AND READING: 1
HOW LIKELY ARE YOU TO NOD OFF OR FALL ASLEEP WHILE SITTING AND TALKING TO SOMEONE: 0

## 2023-05-30 NOTE — PROGRESS NOTES
Kip Castillo MD Briant Lundborg CNP Mayumi Hiraide CNP [address] [facility], [address] [facility] [phone] [address] [facility]  [address] [facility], [address] [place] [address] [phone]     Video Visit- Follow up      Assessment/Plan:      1. Obstructive sleep apnea syndrome  Assessment & Plan:  Chronic-with progression/exacerbation: Reviewed and analyzed results of physiologic download from patient's machine and reviewed with patient. Supplies and parts as needed for her machine. These are medically necessary. Limit caffeine use after 3pm. Based on the analyzed data will continue with current settings. Encouraged her to move her machine to her living room until she can get back to her bedroom and use her machine as much as possible. Encouraged her to contact the office as she starts using the machine more if she encounters any trouble with the pressure on the machine. Will see her back in 3 months. Encouraged her to contact the office with any questions or concerns. Encouraged consistent use of her machine each night, all night. Discussed the importance of treating ARAM from a physiological standpoint. Instructed not to drive unless had 4 hrs of effective therapy for her ARAM the night before. No driving when sleepy. Did review the risks of under or untreated ARAM including, but not limited to, higher risks of motor vehicle accidents, stroke, heart attacks, and death. She understands and accepts all these risks. 2. Morbidly obese ([address]  Assessment & Plan:   Chronic-not stable:  Discussed importance of treating obstructive sleep apnea and getting sufficient sleep to assist with weight control. Encouraged her to work on weight loss through diet and exercise. Recommended DASH or Mediterranean diets. Reviewed, analyzed, and documented physiologic data from patient's PAP machine.     This information was analyzed to assess complexity and medical decision making in regards to

## 2023-05-30 NOTE — ASSESSMENT & PLAN NOTE
Chronic-with progression/exacerbation: Reviewed and analyzed results of physiologic download from patient's machine and reviewed with patient. Supplies and parts as needed for her machine. These are medically necessary. Limit caffeine use after 3pm. Based on the analyzed data will continue with current settings. Encouraged her to move her machine to her living room until she can get back to her bedroom and use her machine as much as possible. Encouraged her to contact the office as she starts using the machine more if she encounters any trouble with the pressure on the machine. Will see her back in 3 months. Encouraged her to contact the office with any questions or concerns. Encouraged consistent use of her machine each night, all night. Discussed the importance of treating ARAM from a physiological standpoint. Instructed not to drive unless had 4 hrs of effective therapy for her ARAM the night before. No driving when sleepy. Did review the risks of under or untreated ARAM including, but not limited to, higher risks of motor vehicle accidents, stroke, heart attacks, and death. She understands and accepts all these risks.

## 2023-06-05 ENCOUNTER — TELEMEDICINE (OUTPATIENT)
Dept: PSYCHOLOGY | Age: 28
End: 2023-06-05
Payer: MEDICAID

## 2023-06-05 DIAGNOSIS — F33.1 MODERATE EPISODE OF RECURRENT MAJOR DEPRESSIVE DISORDER (HCC): Primary | Chronic | ICD-10-CM

## 2023-06-05 PROCEDURE — 90832 PSYTX W PT 30 MINUTES: CPT | Performed by: PSYCHOLOGIST

## 2023-08-08 ENCOUNTER — OFFICE VISIT (OUTPATIENT)
Dept: INTERNAL MEDICINE CLINIC | Age: 28
End: 2023-08-08
Payer: MEDICAID

## 2023-08-08 VITALS
HEIGHT: 62 IN | WEIGHT: 254.8 LBS | SYSTOLIC BLOOD PRESSURE: 118 MMHG | DIASTOLIC BLOOD PRESSURE: 82 MMHG | BODY MASS INDEX: 46.89 KG/M2 | HEART RATE: 90 BPM | OXYGEN SATURATION: 96 %

## 2023-08-08 DIAGNOSIS — F41.9 ANXIETY AND DEPRESSION: ICD-10-CM

## 2023-08-08 DIAGNOSIS — E78.00 ELEVATED LDL CHOLESTEROL LEVEL: ICD-10-CM

## 2023-08-08 DIAGNOSIS — Z00.00 ANNUAL PHYSICAL EXAM: Primary | ICD-10-CM

## 2023-08-08 DIAGNOSIS — F32.A ANXIETY AND DEPRESSION: ICD-10-CM

## 2023-08-08 DIAGNOSIS — E66.01 CLASS 3 SEVERE OBESITY DUE TO EXCESS CALORIES WITHOUT SERIOUS COMORBIDITY WITH BODY MASS INDEX (BMI) OF 40.0 TO 44.9 IN ADULT (HCC): ICD-10-CM

## 2023-08-08 DIAGNOSIS — J30.89 SEASONAL ALLERGIC RHINITIS DUE TO OTHER ALLERGIC TRIGGER: ICD-10-CM

## 2023-08-08 DIAGNOSIS — R06.83 SNORING: ICD-10-CM

## 2023-08-08 LAB
ALBUMIN SERPL-MCNC: 4.4 G/DL (ref 3.4–5)
ALBUMIN/GLOB SERPL: 1.8 {RATIO} (ref 1.1–2.2)
ALP SERPL-CCNC: 70 U/L (ref 40–129)
ALT SERPL-CCNC: 15 U/L (ref 10–40)
ANION GAP SERPL CALCULATED.3IONS-SCNC: 13 MMOL/L (ref 3–16)
AST SERPL-CCNC: 15 U/L (ref 15–37)
BILIRUB SERPL-MCNC: <0.2 MG/DL (ref 0–1)
BUN SERPL-MCNC: 12 MG/DL (ref 7–20)
CALCIUM SERPL-MCNC: 9.2 MG/DL (ref 8.3–10.6)
CHLORIDE SERPL-SCNC: 104 MMOL/L (ref 99–110)
CHOLEST SERPL-MCNC: 161 MG/DL (ref 0–199)
CO2 SERPL-SCNC: 25 MMOL/L (ref 21–32)
CREAT SERPL-MCNC: 0.7 MG/DL (ref 0.6–1.1)
GFR SERPLBLD CREATININE-BSD FMLA CKD-EPI: >60 ML/MIN/{1.73_M2}
GLUCOSE SERPL-MCNC: 92 MG/DL (ref 70–99)
HDLC SERPL-MCNC: 47 MG/DL (ref 40–60)
LDL CHOLESTEROL CALCULATED: 89 MG/DL
POTASSIUM SERPL-SCNC: 3.9 MMOL/L (ref 3.5–5.1)
PROT SERPL-MCNC: 6.8 G/DL (ref 6.4–8.2)
SODIUM SERPL-SCNC: 142 MMOL/L (ref 136–145)
TRIGL SERPL-MCNC: 123 MG/DL (ref 0–150)
TSH SERPL DL<=0.005 MIU/L-ACNC: 0.85 UIU/ML (ref 0.27–4.2)
VLDLC SERPL CALC-MCNC: 25 MG/DL

## 2023-08-08 PROCEDURE — 99395 PREV VISIT EST AGE 18-39: CPT | Performed by: NURSE PRACTITIONER

## 2023-08-08 RX ORDER — SERTRALINE HYDROCHLORIDE 100 MG/1
200 TABLET, FILM COATED ORAL DAILY
Qty: 180 TABLET | Refills: 1 | Status: SHIPPED | OUTPATIENT
Start: 2023-08-08

## 2023-08-08 RX ORDER — BUPROPION HYDROCHLORIDE 300 MG/1
300 TABLET ORAL EVERY MORNING
Qty: 90 TABLET | Refills: 1 | Status: SHIPPED | OUTPATIENT
Start: 2023-08-08

## 2023-08-08 ASSESSMENT — PATIENT HEALTH QUESTIONNAIRE - PHQ9
SUM OF ALL RESPONSES TO PHQ QUESTIONS 1-9: 8
8. MOVING OR SPEAKING SO SLOWLY THAT OTHER PEOPLE COULD HAVE NOTICED. OR THE OPPOSITE, BEING SO FIGETY OR RESTLESS THAT YOU HAVE BEEN MOVING AROUND A LOT MORE THAN USUAL: 0
SUM OF ALL RESPONSES TO PHQ QUESTIONS 1-9: 8
9. THOUGHTS THAT YOU WOULD BE BETTER OFF DEAD, OR OF HURTING YOURSELF: 0
4. FEELING TIRED OR HAVING LITTLE ENERGY: 3
10. IF YOU CHECKED OFF ANY PROBLEMS, HOW DIFFICULT HAVE THESE PROBLEMS MADE IT FOR YOU TO DO YOUR WORK, TAKE CARE OF THINGS AT HOME, OR GET ALONG WITH OTHER PEOPLE: 1
3. TROUBLE FALLING OR STAYING ASLEEP: 3
SUM OF ALL RESPONSES TO PHQ QUESTIONS 1-9: 8
SUM OF ALL RESPONSES TO PHQ9 QUESTIONS 1 & 2: 1
1. LITTLE INTEREST OR PLEASURE IN DOING THINGS: 0
2. FEELING DOWN, DEPRESSED OR HOPELESS: 1
6. FEELING BAD ABOUT YOURSELF - OR THAT YOU ARE A FAILURE OR HAVE LET YOURSELF OR YOUR FAMILY DOWN: 1
SUM OF ALL RESPONSES TO PHQ QUESTIONS 1-9: 8
5. POOR APPETITE OR OVEREATING: 0
7. TROUBLE CONCENTRATING ON THINGS, SUCH AS READING THE NEWSPAPER OR WATCHING TELEVISION: 0

## 2023-08-08 ASSESSMENT — ENCOUNTER SYMPTOMS
SHORTNESS OF BREATH: 0
ABDOMINAL PAIN: 0
DIARRHEA: 0
VOMITING: 0
WHEEZING: 0
SINUS PAIN: 0
CONSTIPATION: 0
NAUSEA: 0
COUGH: 0
SORE THROAT: 0

## 2023-08-08 NOTE — PROGRESS NOTES
Office Visit  8/8/2023    Subjective:  Chief Complaint   Patient presents with    Annual Exam     HPI:   Aaron Melgoza is a 33 y.o. female who presents to the clinic today for an annual physical.    Depression- prescribed Zoloft 200 mg PO daily and wellbutrin 300 mg daily. States she thinks this is working well. Denies side effects. Denies anxiety attacks. Denies SI/HI. Seeing psychology. Allergic rhinitis- taking zyrtec daily. Well controlled at this time. Snoring-  intermittently using CPAP. Elevated LDL/Obesity-  walking on mondays with a friend. Diet is reported as \"recently balanced. \"    Works at a [employer]. Lives alone. 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 For fun, she enjoys gardening. Vitals 8/8/2023 4/11/2023   Weight 254 lb 12.8 oz 268 lb 3.2 oz     Review of Systems   Constitutional:  Negative for chills, fatigue, fever and unexpected weight change. HENT:  Negative for congestion, postnasal drip, sinus pain and sore throat. Eyes:  Negative for visual disturbance. Respiratory:  Negative for cough, shortness of breath and wheezing. Cardiovascular:  Negative for chest pain, palpitations and leg swelling. Gastrointestinal:  Negative for abdominal pain, constipation, diarrhea, nausea and vomiting. Genitourinary:  Negative for dysuria, frequency, hematuria and urgency. Skin:  Negative for pallor and rash. Neurological:  Negative for dizziness, weakness, light-headedness, numbness and headaches. Psychiatric/Behavioral:  Negative for dysphoric mood, self-injury, sleep disturbance and suicidal ideas. The patient is not nervous/anxious.       Allergies   Allergen Reactions    Ceftin [Cefuroxime] Rash     Family History   Problem Relation Age of Onset    No Known Problems Mother     Hypertension Father     Other Father         Romero's esophagus    Lung Cancer Maternal Grandmother     Diabetes Paternal Grandmother     Cancer Paternal Grandfather     Breast Cancer

## 2023-11-27 ENCOUNTER — OFFICE VISIT (OUTPATIENT)
Dept: PSYCHOLOGY | Age: 28
End: 2023-11-27
Payer: MEDICAID

## 2023-11-27 DIAGNOSIS — F41.1 GAD (GENERALIZED ANXIETY DISORDER): Chronic | ICD-10-CM

## 2023-11-27 DIAGNOSIS — F33.1 MODERATE EPISODE OF RECURRENT MAJOR DEPRESSIVE DISORDER (HCC): Primary | Chronic | ICD-10-CM

## 2023-11-27 PROCEDURE — 1036F TOBACCO NON-USER: CPT | Performed by: PSYCHOLOGIST

## 2023-11-27 PROCEDURE — 90832 PSYTX W PT 30 MINUTES: CPT | Performed by: PSYCHOLOGIST

## 2023-11-27 ASSESSMENT — PATIENT HEALTH QUESTIONNAIRE - PHQ9
3. TROUBLE FALLING OR STAYING ASLEEP: 3
6. FEELING BAD ABOUT YOURSELF - OR THAT YOU ARE A FAILURE OR HAVE LET YOURSELF OR YOUR FAMILY DOWN: 2
SUM OF ALL RESPONSES TO PHQ QUESTIONS 1-9: 16
5. POOR APPETITE OR OVEREATING: 1
8. MOVING OR SPEAKING SO SLOWLY THAT OTHER PEOPLE COULD HAVE NOTICED. OR THE OPPOSITE, BEING SO FIGETY OR RESTLESS THAT YOU HAVE BEEN MOVING AROUND A LOT MORE THAN USUAL: 1
4. FEELING TIRED OR HAVING LITTLE ENERGY: 3
9. THOUGHTS THAT YOU WOULD BE BETTER OFF DEAD, OR OF HURTING YOURSELF: 0
SUM OF ALL RESPONSES TO PHQ QUESTIONS 1-9: 16
7. TROUBLE CONCENTRATING ON THINGS, SUCH AS READING THE NEWSPAPER OR WATCHING TELEVISION: 0
10. IF YOU CHECKED OFF ANY PROBLEMS, HOW DIFFICULT HAVE THESE PROBLEMS MADE IT FOR YOU TO DO YOUR WORK, TAKE CARE OF THINGS AT HOME, OR GET ALONG WITH OTHER PEOPLE: 2
SUM OF ALL RESPONSES TO PHQ QUESTIONS 1-9: 16
SUM OF ALL RESPONSES TO PHQ9 QUESTIONS 1 & 2: 6
1. LITTLE INTEREST OR PLEASURE IN DOING THINGS: 3
SUM OF ALL RESPONSES TO PHQ QUESTIONS 1-9: 16
2. FEELING DOWN, DEPRESSED OR HOPELESS: 3

## 2023-11-27 ASSESSMENT — ANXIETY QUESTIONNAIRES
IF YOU CHECKED OFF ANY PROBLEMS ON THIS QUESTIONNAIRE, HOW DIFFICULT HAVE THESE PROBLEMS MADE IT FOR YOU TO DO YOUR WORK, TAKE CARE OF THINGS AT HOME, OR GET ALONG WITH OTHER PEOPLE: SOMEWHAT DIFFICULT
5. BEING SO RESTLESS THAT IT IS HARD TO SIT STILL: 1
4. TROUBLE RELAXING: 2
2. NOT BEING ABLE TO STOP OR CONTROL WORRYING: 2
1. FEELING NERVOUS, ANXIOUS, OR ON EDGE: 3
3. WORRYING TOO MUCH ABOUT DIFFERENT THINGS: 2
6. BECOMING EASILY ANNOYED OR IRRITABLE: 2
7. FEELING AFRAID AS IF SOMETHING AWFUL MIGHT HAPPEN: 2
GAD7 TOTAL SCORE: 14

## 2023-11-27 ASSESSMENT — COLUMBIA-SUICIDE SEVERITY RATING SCALE - C-SSRS
7. DID THIS OCCUR IN THE LAST THREE MONTHS: NO
4. HAVE YOU HAD THESE THOUGHTS AND HAD SOME INTENTION OF ACTING ON THEM?: NO
5. HAVE YOU STARTED TO WORK OUT OR WORKED OUT THE DETAILS OF HOW TO KILL YOURSELF? DO YOU INTEND TO CARRY OUT THIS PLAN?: NO
3. HAVE YOU BEEN THINKING ABOUT HOW YOU MIGHT KILL YOURSELF?: NO

## 2023-11-27 NOTE — PROGRESS NOTES
Behavioral Health Consultation  Yana Ibarra, Ph.D.  Psychologist  11/27/2023  1:17 PM      Time spent with Patient: 25 minutes  This is patient's  40th   [facility] appointment. Reason for Consult:    Chief Complaint   Patient presents with    Depression       Feedback given to PCP. S:  Pt seen for f/u of depression, anxiety. Seen for first visit in almost 5 mos. Pt reported unchanged mood and sxs. Has not attempted using CPAP since June. Getting about 5-6 hrs of sleep/night, \"I'm tired all the time. \" Discussed habituation during rest times to aide in this. Continues to keep dad blocked, was able to have a fine conversation when in their home on [address]. Been playing DnD w friends, she is the [email or web address]. Been doing for a few months and enjoys this. O:  MSE:  Appearance    alert, cooperative  Appetite normal  Sleep disturbance No  Fatigue Yes  Loss of pleasure No  Impulsive behavior No  Speech    spontaneous, normal rate, normal volume and well articulated  Mood    normal  Affect    anxious affect  Thought Content    intact  Thought Process    goal directed and coherent  Associations    logical connections  Insight    Fair  Judgment    Intact  Orientation    oriented to person, place, time, and general circumstances  Memory    recent and remote memory intact  Attention/Concentration    impaired  Morbid ideation No  Suicide Assessment    no suicidal ideation    History:  Social History:   Social History     Socioeconomic History    Marital status: Single     Spouse name: Not on file    Number of children: Not on file    Years of education: Not on file    Highest education level: Not on file   Occupational History    Occupation:  grad in fine arts     Comment: [id number]    Occupation:  at [employer].    Tobacco Use    Smoking status: Never    Smokeless tobacco: Never   Vaping Use    Vaping Use: Never used   Substance and Sexual Activity    Alcohol

## 2023-12-05 ENCOUNTER — OFFICE VISIT (OUTPATIENT)
Dept: INTERNAL MEDICINE CLINIC | Age: 28
End: 2023-12-05
Payer: MEDICAID

## 2023-12-05 VITALS
HEIGHT: 62 IN | SYSTOLIC BLOOD PRESSURE: 102 MMHG | DIASTOLIC BLOOD PRESSURE: 70 MMHG | BODY MASS INDEX: 46.12 KG/M2 | HEART RATE: 72 BPM | WEIGHT: 250.6 LBS | OXYGEN SATURATION: 98 %

## 2023-12-05 DIAGNOSIS — F32.A ANXIETY AND DEPRESSION: Primary | ICD-10-CM

## 2023-12-05 DIAGNOSIS — E66.01 CLASS 3 SEVERE OBESITY DUE TO EXCESS CALORIES WITHOUT SERIOUS COMORBIDITY WITH BODY MASS INDEX (BMI) OF 40.0 TO 44.9 IN ADULT (HCC): ICD-10-CM

## 2023-12-05 DIAGNOSIS — E78.00 ELEVATED LDL CHOLESTEROL LEVEL: ICD-10-CM

## 2023-12-05 DIAGNOSIS — J30.89 SEASONAL ALLERGIC RHINITIS DUE TO OTHER ALLERGIC TRIGGER: ICD-10-CM

## 2023-12-05 DIAGNOSIS — R06.83 SNORING: ICD-10-CM

## 2023-12-05 DIAGNOSIS — F41.9 ANXIETY AND DEPRESSION: Primary | ICD-10-CM

## 2023-12-05 PROCEDURE — 99213 OFFICE O/P EST LOW 20 MIN: CPT | Performed by: NURSE PRACTITIONER

## 2023-12-05 PROCEDURE — G8484 FLU IMMUNIZE NO ADMIN: HCPCS | Performed by: NURSE PRACTITIONER

## 2023-12-05 PROCEDURE — G8417 CALC BMI ABV UP PARAM F/U: HCPCS | Performed by: NURSE PRACTITIONER

## 2023-12-05 PROCEDURE — 1036F TOBACCO NON-USER: CPT | Performed by: NURSE PRACTITIONER

## 2023-12-05 PROCEDURE — G8427 DOCREV CUR MEDS BY ELIG CLIN: HCPCS | Performed by: NURSE PRACTITIONER

## 2023-12-05 RX ORDER — SERTRALINE HYDROCHLORIDE 100 MG/1
200 TABLET, FILM COATED ORAL DAILY
Qty: 180 TABLET | Refills: 1 | Status: SHIPPED | OUTPATIENT
Start: 2023-12-05

## 2023-12-05 RX ORDER — BUPROPION HYDROCHLORIDE 300 MG/1
300 TABLET ORAL EVERY MORNING
Qty: 90 TABLET | Refills: 1 | Status: SHIPPED | OUTPATIENT
Start: 2023-12-05

## 2023-12-05 ASSESSMENT — PATIENT HEALTH QUESTIONNAIRE - PHQ9
SUM OF ALL RESPONSES TO PHQ QUESTIONS 1-9: 14
1. LITTLE INTEREST OR PLEASURE IN DOING THINGS: 0
SUM OF ALL RESPONSES TO PHQ QUESTIONS 1-9: 14
3. TROUBLE FALLING OR STAYING ASLEEP: 3
2. FEELING DOWN, DEPRESSED OR HOPELESS: 3
4. FEELING TIRED OR HAVING LITTLE ENERGY: 3
5. POOR APPETITE OR OVEREATING: 2
8. MOVING OR SPEAKING SO SLOWLY THAT OTHER PEOPLE COULD HAVE NOTICED. OR THE OPPOSITE, BEING SO FIGETY OR RESTLESS THAT YOU HAVE BEEN MOVING AROUND A LOT MORE THAN USUAL: 1
6. FEELING BAD ABOUT YOURSELF - OR THAT YOU ARE A FAILURE OR HAVE LET YOURSELF OR YOUR FAMILY DOWN: 2
SUM OF ALL RESPONSES TO PHQ QUESTIONS 1-9: 14
10. IF YOU CHECKED OFF ANY PROBLEMS, HOW DIFFICULT HAVE THESE PROBLEMS MADE IT FOR YOU TO DO YOUR WORK, TAKE CARE OF THINGS AT HOME, OR GET ALONG WITH OTHER PEOPLE: 1
SUM OF ALL RESPONSES TO PHQ9 QUESTIONS 1 & 2: 3
7. TROUBLE CONCENTRATING ON THINGS, SUCH AS READING THE NEWSPAPER OR WATCHING TELEVISION: 0
SUM OF ALL RESPONSES TO PHQ QUESTIONS 1-9: 14
9. THOUGHTS THAT YOU WOULD BE BETTER OFF DEAD, OR OF HURTING YOURSELF: 0

## 2023-12-05 ASSESSMENT — ENCOUNTER SYMPTOMS
VOMITING: 0
ABDOMINAL PAIN: 0
WHEEZING: 0
NAUSEA: 0
SHORTNESS OF BREATH: 0
CONSTIPATION: 0
COUGH: 0
DIARRHEA: 0

## 2023-12-05 NOTE — PROGRESS NOTES
Office Visit   12/5/2023    Subjective:  Chief Complaint   Patient presents with    Follow-up     No Concerns per patient      HPI:   Jahaira Taylor is a 33 y.o. female who presents to the clinic today for follow up. Depression- prescribed Zoloft 200 mg PO daily and wellbutrin 300 mg daily. States she thinks this is working well overall. states she feels this is the correct dose for her. Denies side effects. Denies anxiety attacks. Denies SI/HI. Her father is being worked up for possible cancer diagnosis. Seeing psychology. Allergic rhinitis- taking zyrtec daily. Well controlled at this time. Snoring-  intermittently using CPAP. Elevated LDL/Obesity- not exercising. Diet is reported as \"spotty. \"    Review of Systems   Constitutional:  Negative for chills, fatigue and fever. Respiratory:  Negative for cough, shortness of breath and wheezing. Cardiovascular:  Negative for chest pain, palpitations and leg swelling. Gastrointestinal:  Negative for abdominal pain, constipation, diarrhea, nausea and vomiting. Skin:  Negative for pallor and rash. Neurological:  Negative for dizziness, weakness, light-headedness, numbness and headaches. Psychiatric/Behavioral:  Negative for dysphoric mood, self-injury, sleep disturbance and suicidal ideas. The patient is not nervous/anxious. Allergies   Allergen Reactions    Ceftin [Cefuroxime] Rash     Current Outpatient Rx   Medication Sig Dispense Refill    buPROPion (WELLBUTRIN XL) 300 MG extended release tablet Take 1 tablet by mouth every morning 90 tablet 1    sertraline (ZOLOFT) 100 MG tablet Take 2 tablets by mouth daily 180 tablet 1    clobetasol (TEMOVATE) 0.05 % cream Apply thin layer once or twice daily to eczema prone skin. Try to break for a full 1 week after 3 weeks on the same area.  60 g 0    norethindrone-ethinyl estradiol-iron (MICROGESTIN FE1.5/30) 1.5-30 MG-MCG tablet Take 1 tablet by mouth daily      cetirizine (ZYRTEC) 10 MG tablet

## 2024-01-08 ENCOUNTER — TELEMEDICINE (OUTPATIENT)
Dept: PSYCHOLOGY | Age: 29
End: 2024-01-08
Payer: MEDICAID

## 2024-01-08 DIAGNOSIS — F33.1 MODERATE EPISODE OF RECURRENT MAJOR DEPRESSIVE DISORDER (HCC): Primary | Chronic | ICD-10-CM

## 2024-01-08 DIAGNOSIS — F41.1 GAD (GENERALIZED ANXIETY DISORDER): Chronic | ICD-10-CM

## 2024-01-08 PROCEDURE — 90832 PSYTX W PT 30 MINUTES: CPT | Performed by: PSYCHOLOGIST

## 2024-01-08 NOTE — PROGRESS NOTES
social support, exercise), Supportive techniques, and Provided Psychoeducation re: over-fx bx        Documentation was done using voice recognition dragon software.  Every effort was made to ensure accuracy; however, inadvertent, unintentional computerized transcription errors may be present.

## 2024-02-12 ENCOUNTER — TELEMEDICINE (OUTPATIENT)
Dept: PSYCHOLOGY | Age: 29
End: 2024-02-12
Payer: MEDICAID

## 2024-02-12 DIAGNOSIS — F41.1 GAD (GENERALIZED ANXIETY DISORDER): Primary | Chronic | ICD-10-CM

## 2024-02-12 DIAGNOSIS — F33.1 MODERATE EPISODE OF RECURRENT MAJOR DEPRESSIVE DISORDER (HCC): Chronic | ICD-10-CM

## 2024-02-12 PROCEDURE — 90832 PSYTX W PT 30 MINUTES: CPT | Performed by: PSYCHOLOGIST

## 2024-02-12 NOTE — PROGRESS NOTES
circumstances  Memory    recent and remote memory intact  Attention/Concentration    impaired  Morbid ideation No  Suicide Assessment    no suicidal ideation    History:  Social History:   Social History     Socioeconomic History    Marital status: Single     Spouse name: Not on file    Number of children: Not on file    Years of education: Not on file    Highest education level: Not on file   Occupational History    Occupation:  grad in fine arts     Comment: [employer]    Occupation:  at [employer].   Tobacco Use    Smoking status: Never    Smokeless tobacco: Never   Vaping Use    Vaping Use: Never used   Substance and Sexual Activity    Alcohol use: Yes     Comment: 1 mikes hard lemonade in 1 month    Drug use: Never    Sexual activity: Never   Other Topics Concern    Not on file   Social History Narrative    Works at a [employer].  Lives alone. 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 For fun, she enjoys gardening.      Social Determinants of Health     Financial Resource Strain: Low Risk  (4/11/2023)    Overall Financial Resource Strain (CARDIA)     Difficulty of Paying Living Expenses: Not hard at all   Food Insecurity: Not on file (4/11/2023)   Transportation Needs: Unknown (4/11/2023)    PRAPARE - Transportation     Lack of Transportation (Medical): Not on file     Lack of Transportation (Non-Medical): No   Physical Activity: Not on file   Stress: Not on file   Social Connections: Not on file   Intimate Partner Violence: Not on file   Housing Stability: Unknown (4/11/2023)    Housing Stability Vital Sign     Unable to Pay for Housing in the Last Year: Not on file     Number of Places Lived in the Last Year: Not on file     Unstable Housing in the Last Year: No     TOBACCO:   reports that she has never smoked. She has never used smokeless tobacco.  ETOH:   reports current alcohol use.      Diagnosis:  1. Moderate episode of recurrent major depressive

## 2024-04-15 ENCOUNTER — TELEMEDICINE (OUTPATIENT)
Dept: PSYCHOLOGY | Age: 29
End: 2024-04-15
Payer: COMMERCIAL

## 2024-04-15 DIAGNOSIS — F41.1 GAD (GENERALIZED ANXIETY DISORDER): Chronic | ICD-10-CM

## 2024-04-15 DIAGNOSIS — F33.1 MODERATE EPISODE OF RECURRENT MAJOR DEPRESSIVE DISORDER (HCC): Primary | Chronic | ICD-10-CM

## 2024-04-15 PROCEDURE — 90832 PSYTX W PT 30 MINUTES: CPT | Performed by: PSYCHOLOGIST

## 2024-05-20 ENCOUNTER — TELEMEDICINE (OUTPATIENT)
Dept: PSYCHOLOGY | Age: 29
End: 2024-05-20

## 2024-05-20 DIAGNOSIS — F33.1 MODERATE EPISODE OF RECURRENT MAJOR DEPRESSIVE DISORDER (HCC): Primary | Chronic | ICD-10-CM

## 2024-05-20 DIAGNOSIS — F41.1 GAD (GENERALIZED ANXIETY DISORDER): Chronic | ICD-10-CM

## 2024-05-20 NOTE — PROGRESS NOTES
Sexual Activity    Alcohol use: Yes     Comment: 1 mikes hard lemonade in 1 month    Drug use: Never    Sexual activity: Never   Other Topics Concern    Not on file   Social History Narrative    Works at a [employer].  Lives alone. 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 For fun, she enjoys gardening.      Social Determinants of Health     Financial Resource Strain: Low Risk  (4/11/2023)    Overall Financial Resource Strain (CARDIA)     Difficulty of Paying Living Expenses: Not hard at all   Food Insecurity: Not on file (4/11/2023)   Transportation Needs: Unknown (4/11/2023)    PRAPARE - Transportation     Lack of Transportation (Medical): Not on file     Lack of Transportation (Non-Medical): No   Physical Activity: Not on file   Stress: Not on file   Social Connections: Not on file   Intimate Partner Violence: Not on file   Housing Stability: Unknown (4/11/2023)    Housing Stability Vital Sign     Unable to Pay for Housing in the Last Year: Not on file     Number of Places Lived in the Last Year: Not on file     Unstable Housing in the Last Year: No     TOBACCO:   reports that she has never smoked. She has never used smokeless tobacco.  ETOH:   reports current alcohol use.      Diagnosis:  1. Moderate episode of recurrent major depressive disorder (HCC)    2. BRENTON (generalized anxiety disorder)          Plan:  Pt interventions:  Trained in strategies for increasing balanced thinking, Discussed and set plan for behavioral activation, Discussed and problem-solved barriers in adhering to behavioral change plan, and Supportive techniques        Documentation was done using voice recognition dragon software.  Every effort was made to ensure accuracy; however, inadvertent, unintentional computerized transcription errors may be present.

## 2024-06-24 ENCOUNTER — TELEMEDICINE (OUTPATIENT)
Dept: PSYCHOLOGY | Age: 29
End: 2024-06-24

## 2024-06-24 DIAGNOSIS — F33.1 MODERATE EPISODE OF RECURRENT MAJOR DEPRESSIVE DISORDER (HCC): Primary | Chronic | ICD-10-CM

## 2024-06-24 NOTE — PROGRESS NOTES
Behavioral Health Consultation  Annemaire Caba, Ph.D.  Psychologist  6/24/2024  3:12 PM      Time spent with Patient: 27 minutes  This is patient's  45th   [facility] appointment.    Reason for Consult:    Chief Complaint   Patient presents with    Depression       Feedback given to PCP.    S:  Pt seen for f/u of depression, anxiety. Pt reported unchanged mood and sxs. Work has no A/C, so work has been arden difficult. Also has a new coworker who talks about conspiracy theories throughout his shift and then started a significant conversation among coworkers about \"good fat\" and \"bad fat\" people and whether or not this was a joke. Pt perceives her lack of romantic relationship is d/t her weight.     Has made progress on cleaning up her apartment. Very hesitant to look for work. \"The more I work the less I think I can work. I just don't feel like I'd be happy at any job.\" Less about avoidance of unknown and more about being convinced she will hate any job. Knows she is needed at this job and has good job security.      O:  MSE:  Appearance    alert, cooperative  Appetite normal  Sleep disturbance No  Fatigue Yes  Loss of pleasure No  Impulsive behavior No  Speech    spontaneous, normal rate, normal volume and well articulated  Mood    anxious  Affect    anxious affect  Thought Content    intact  Thought Process    goal directed and coherent  Associations    logical connections  Insight    Fair  Judgment    Intact  Orientation    oriented to person, place, time, and general circumstances  Memory    recent and remote memory intact  Attention/Concentration    impaired  Morbid ideation No  Suicide Assessment    no suicidal ideation    History:  Social History:   Social History     Socioeconomic History    Marital status: Single     Spouse name: Not on file    Number of children: Not on file    Years of education: Not on file    Highest education level: Not on file   Occupational History    Occupation:  grad in fine arts

## 2024-08-11 ASSESSMENT — PATIENT HEALTH QUESTIONNAIRE - PHQ9
5. POOR APPETITE OR OVEREATING: SEVERAL DAYS
SUM OF ALL RESPONSES TO PHQ QUESTIONS 1-9: 10
SUM OF ALL RESPONSES TO PHQ9 QUESTIONS 1 & 2: 1
SUM OF ALL RESPONSES TO PHQ QUESTIONS 1-9: 10
7. TROUBLE CONCENTRATING ON THINGS, SUCH AS READING THE NEWSPAPER OR WATCHING TELEVISION: SEVERAL DAYS
9. THOUGHTS THAT YOU WOULD BE BETTER OFF DEAD, OR OF HURTING YOURSELF: NOT AT ALL
5. POOR APPETITE OR OVEREATING: SEVERAL DAYS
1. LITTLE INTEREST OR PLEASURE IN DOING THINGS: NOT AT ALL
2. FEELING DOWN, DEPRESSED OR HOPELESS: SEVERAL DAYS
SUM OF ALL RESPONSES TO PHQ QUESTIONS 1-9: 10
6. FEELING BAD ABOUT YOURSELF - OR THAT YOU ARE A FAILURE OR HAVE LET YOURSELF OR YOUR FAMILY DOWN: SEVERAL DAYS
SUM OF ALL RESPONSES TO PHQ QUESTIONS 1-9: 10
2. FEELING DOWN, DEPRESSED OR HOPELESS: SEVERAL DAYS
SUM OF ALL RESPONSES TO PHQ QUESTIONS 1-9: 10
8. MOVING OR SPEAKING SO SLOWLY THAT OTHER PEOPLE COULD HAVE NOTICED. OR THE OPPOSITE, BEING SO FIGETY OR RESTLESS THAT YOU HAVE BEEN MOVING AROUND A LOT MORE THAN USUAL: NOT AT ALL
8. MOVING OR SPEAKING SO SLOWLY THAT OTHER PEOPLE COULD HAVE NOTICED. OR THE OPPOSITE - BEING SO FIDGETY OR RESTLESS THAT YOU HAVE BEEN MOVING AROUND A LOT MORE THAN USUAL: NOT AT ALL
4. FEELING TIRED OR HAVING LITTLE ENERGY: NEARLY EVERY DAY
4. FEELING TIRED OR HAVING LITTLE ENERGY: NEARLY EVERY DAY
7. TROUBLE CONCENTRATING ON THINGS, SUCH AS READING THE NEWSPAPER OR WATCHING TELEVISION: SEVERAL DAYS
1. LITTLE INTEREST OR PLEASURE IN DOING THINGS: NOT AT ALL
3. TROUBLE FALLING OR STAYING ASLEEP: NEARLY EVERY DAY
10. IF YOU CHECKED OFF ANY PROBLEMS, HOW DIFFICULT HAVE THESE PROBLEMS MADE IT FOR YOU TO DO YOUR WORK, TAKE CARE OF THINGS AT HOME, OR GET ALONG WITH OTHER PEOPLE: SOMEWHAT DIFFICULT
9. THOUGHTS THAT YOU WOULD BE BETTER OFF DEAD, OR OF HURTING YOURSELF: NOT AT ALL
3. TROUBLE FALLING OR STAYING ASLEEP: NEARLY EVERY DAY
10. IF YOU CHECKED OFF ANY PROBLEMS, HOW DIFFICULT HAVE THESE PROBLEMS MADE IT FOR YOU TO DO YOUR WORK, TAKE CARE OF THINGS AT HOME, OR GET ALONG WITH OTHER PEOPLE: SOMEWHAT DIFFICULT
6. FEELING BAD ABOUT YOURSELF - OR THAT YOU ARE A FAILURE OR HAVE LET YOURSELF OR YOUR FAMILY DOWN: SEVERAL DAYS

## 2024-08-13 ENCOUNTER — OFFICE VISIT (OUTPATIENT)
Dept: INTERNAL MEDICINE CLINIC | Age: 29
End: 2024-08-13
Payer: COMMERCIAL

## 2024-08-13 VITALS
WEIGHT: 243.2 LBS | BODY MASS INDEX: 44.75 KG/M2 | OXYGEN SATURATION: 99 % | HEART RATE: 89 BPM | HEIGHT: 62 IN | SYSTOLIC BLOOD PRESSURE: 116 MMHG | DIASTOLIC BLOOD PRESSURE: 76 MMHG

## 2024-08-13 DIAGNOSIS — E78.00 ELEVATED LDL CHOLESTEROL LEVEL: ICD-10-CM

## 2024-08-13 DIAGNOSIS — F41.9 ANXIETY AND DEPRESSION: ICD-10-CM

## 2024-08-13 DIAGNOSIS — E66.01 CLASS 3 SEVERE OBESITY DUE TO EXCESS CALORIES WITHOUT SERIOUS COMORBIDITY WITH BODY MASS INDEX (BMI) OF 40.0 TO 44.9 IN ADULT (HCC): ICD-10-CM

## 2024-08-13 DIAGNOSIS — J30.89 SEASONAL ALLERGIC RHINITIS DUE TO OTHER ALLERGIC TRIGGER: ICD-10-CM

## 2024-08-13 DIAGNOSIS — R06.83 SNORING: ICD-10-CM

## 2024-08-13 DIAGNOSIS — F32.A ANXIETY AND DEPRESSION: ICD-10-CM

## 2024-08-13 DIAGNOSIS — Z71.85 VACCINE COUNSELING: ICD-10-CM

## 2024-08-13 DIAGNOSIS — Z00.00 ANNUAL PHYSICAL EXAM: Primary | ICD-10-CM

## 2024-08-13 LAB
ANION GAP SERPL CALCULATED.3IONS-SCNC: 12 MMOL/L (ref 3–16)
BUN SERPL-MCNC: 14 MG/DL (ref 7–20)
CALCIUM SERPL-MCNC: 9.3 MG/DL (ref 8.3–10.6)
CHLORIDE SERPL-SCNC: 105 MMOL/L (ref 99–110)
CHOLEST SERPL-MCNC: 158 MG/DL (ref 0–199)
CO2 SERPL-SCNC: 23 MMOL/L (ref 21–32)
CREAT SERPL-MCNC: 0.7 MG/DL (ref 0.6–1.1)
GFR SERPLBLD CREATININE-BSD FMLA CKD-EPI: >90 ML/MIN/{1.73_M2}
GLUCOSE SERPL-MCNC: 79 MG/DL (ref 70–99)
HDLC SERPL-MCNC: 47 MG/DL (ref 40–60)
LDL CHOLESTEROL: 91 MG/DL
POTASSIUM SERPL-SCNC: 4.2 MMOL/L (ref 3.5–5.1)
SODIUM SERPL-SCNC: 140 MMOL/L (ref 136–145)
TRIGL SERPL-MCNC: 100 MG/DL (ref 0–150)
TSH SERPL DL<=0.005 MIU/L-ACNC: 0.71 UIU/ML (ref 0.27–4.2)
VLDLC SERPL CALC-MCNC: 20 MG/DL

## 2024-08-13 PROCEDURE — 99395 PREV VISIT EST AGE 18-39: CPT | Performed by: NURSE PRACTITIONER

## 2024-08-13 RX ORDER — BUPROPION HYDROCHLORIDE 300 MG/1
300 TABLET ORAL EVERY MORNING
Qty: 90 TABLET | Refills: 1 | Status: SHIPPED | OUTPATIENT
Start: 2024-08-13

## 2024-08-13 RX ORDER — SERTRALINE HYDROCHLORIDE 100 MG/1
200 TABLET, FILM COATED ORAL DAILY
Qty: 180 TABLET | Refills: 1 | Status: SHIPPED | OUTPATIENT
Start: 2024-08-13

## 2024-08-13 SDOH — ECONOMIC STABILITY: FOOD INSECURITY: WITHIN THE PAST 12 MONTHS, THE FOOD YOU BOUGHT JUST DIDN'T LAST AND YOU DIDN'T HAVE MONEY TO GET MORE.: NEVER TRUE

## 2024-08-13 SDOH — ECONOMIC STABILITY: FOOD INSECURITY: WITHIN THE PAST 12 MONTHS, YOU WORRIED THAT YOUR FOOD WOULD RUN OUT BEFORE YOU GOT MONEY TO BUY MORE.: NEVER TRUE

## 2024-08-13 SDOH — ECONOMIC STABILITY: INCOME INSECURITY: HOW HARD IS IT FOR YOU TO PAY FOR THE VERY BASICS LIKE FOOD, HOUSING, MEDICAL CARE, AND HEATING?: NOT HARD AT ALL

## 2024-08-13 ASSESSMENT — ENCOUNTER SYMPTOMS
DIARRHEA: 0
NAUSEA: 0
CONSTIPATION: 0
ABDOMINAL PAIN: 0
COUGH: 0
WHEEZING: 0
SHORTNESS OF BREATH: 0
VOMITING: 0

## 2024-08-13 NOTE — PROGRESS NOTES
Annual Physical Office Visit  8/13/2024    Subjective:  Chief Complaint   Patient presents with    Annual Exam     6 months   Fasting for labs today (13 hours)      HPI:   Lynette Martinez is a 29 y.o. female who presents to the clinic today for an annual physical.    Depression- prescribed Zoloft 200 mg PO daily and wellbutrin 300 mg daily.  States she thinks this is working well. Denies side effects. Would like to remain on this current dose.  Denies anxiety attacks. Denies SI/HI.    Seeing psychology.     Allergic rhinitis- taking zyrtec OTC daily. Well controlled at this time.     Snoring-  not using CPAP.     Elevated LDL/Obesity- walking for exercise once a week. Diet is reported as healthier than previously.    Denies acute concerns.    Works at a [employer].  Lives alone. 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 For fun, she enjoys gardening.     Review of Systems   Constitutional:  Negative for chills, fatigue and fever.   Respiratory:  Negative for cough, shortness of breath and wheezing.    Cardiovascular:  Negative for chest pain, palpitations and leg swelling.   Gastrointestinal:  Negative for abdominal pain, constipation, diarrhea, nausea and vomiting.   Skin:  Negative for pallor and rash.   Neurological:  Negative for dizziness, weakness, light-headedness, numbness and headaches.   Psychiatric/Behavioral:  Negative for dysphoric mood, self-injury, sleep disturbance and suicidal ideas. The patient is not nervous/anxious.      Allergies   Allergen Reactions    Ceftin [Cefuroxime] Rash     Family History   Problem Relation Age of Onset    No Known Problems Mother     Hypertension Father     Other Father         Romero's esophagus    Lung Cancer Maternal Grandmother     Diabetes Paternal Grandmother     Cancer Paternal Grandfather     Breast Cancer Neg Hx     Ovarian Cancer Neg Hx     Heart Attack Neg Hx     Stroke Neg Hx      Current Outpatient Rx   Medication Sig Dispense Refill    sertraline

## 2024-09-16 ENCOUNTER — TELEMEDICINE (OUTPATIENT)
Dept: PSYCHOLOGY | Age: 29
End: 2024-09-16

## 2024-09-16 DIAGNOSIS — F33.1 MODERATE EPISODE OF RECURRENT MAJOR DEPRESSIVE DISORDER (HCC): Chronic | ICD-10-CM

## 2024-09-16 DIAGNOSIS — F41.1 GAD (GENERALIZED ANXIETY DISORDER): Primary | Chronic | ICD-10-CM

## 2024-09-16 NOTE — PROGRESS NOTES
Behavioral Health Consultation  Annemarie Caba, Ph.D.  Psychologist  9/16/2024  11:38 AM      Time spent with Patient: 28 minutes  This is patient's  46th   [facility] appointment.    Reason for Consult:    Chief Complaint   Patient presents with    Depression       Feedback given to PCP.    S:  Pt seen for f/u of depression, anxiety. Pt reported stable mood and sxs. Is now , received a small compensation increase that she doesn't view as commiserate w the increased responsibility. She is, however, able to better structure the kitchen. Frustrated w self for having another crush on an unavailable man (Soham), attracted to their banter and friendliness.     Did opt to see dad on his birthday. He does have cancer and pt is trying to keep things civil \"it's like a pen pal relationship. It's a check-in.\"     Stated she functions well enough at work and other activities, but stops functioning the way she'd like at home. Stated coworkers have been talking about dieting a lot which impacts pt's food consumption and basically stopped eating at work as a response. Diet includes maybe a [employer] granola bar, not eating during shift, and then will eat wheat thins and cheese or maybe left overs. Expressed concern about this level of restriction. \"I'll just punish myself by not eating for awhile.\" Pt views her weight as unacceptable in society. \"I don't fit what society wants and I'm going to die alone... I just think I'm always going to be miserable.\"       Was a healthy weight until puberty where she started to gain considerably. When pt was a teen, Mom asked her if she was gaining weight to be \"unwanted.\" Mom called her fat on multiple occasions and then denied it. Agreed to focus on cognitions about weight next time.     O:  MSE:  Appearance    alert, cooperative  Appetite normal  Sleep disturbance No  Fatigue Yes  Loss of pleasure No  Impulsive behavior No  Speech    spontaneous, normal rate, normal volume and well

## 2024-10-14 ENCOUNTER — TELEMEDICINE (OUTPATIENT)
Dept: PSYCHOLOGY | Age: 29
End: 2024-10-14

## 2024-10-14 DIAGNOSIS — F41.1 GAD (GENERALIZED ANXIETY DISORDER): Chronic | ICD-10-CM

## 2024-10-14 DIAGNOSIS — F33.1 MODERATE EPISODE OF RECURRENT MAJOR DEPRESSIVE DISORDER (HCC): Primary | Chronic | ICD-10-CM

## 2024-10-14 NOTE — PROGRESS NOTES
a [employer].  Lives alone. 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 For fun, she enjoys gardening.      Social Determinants of Health     Financial Resource Strain: Low Risk  (8/13/2024)    Overall Financial Resource Strain (CARDIA)     Difficulty of Paying Living Expenses: Not hard at all   Food Insecurity: No Food Insecurity (8/13/2024)    Hunger Vital Sign     Worried About Running Out of Food in the Last Year: Never true     Ran Out of Food in the Last Year: Never true   Transportation Needs: Unknown (8/13/2024)    PRAPARE - Transportation     Lack of Transportation (Medical): Not on file     Lack of Transportation (Non-Medical): No   Physical Activity: Not on file   Stress: Not on file   Social Connections: Not on file   Intimate Partner Violence: Not on file   Housing Stability: Unknown (8/13/2024)    Housing Stability Vital Sign     Unable to Pay for Housing in the Last Year: Not on file     Number of Times Moved in the Last Year: Not on file     Homeless in the Last Year: No     TOBACCO:   reports that she has never smoked. She has never used smokeless tobacco.  ETOH:   reports current alcohol use.      Diagnosis:  1. Moderate episode of recurrent major depressive disorder (HCC)    2. BRENTON (generalized anxiety disorder)          Plan:  Pt interventions:  Trained in strategies for increasing balanced thinking, Supportive techniques, and Identified maladaptive thoughts    Lynette Martinez was evaluated through a synchronous (real-time) audio-video encounter. The patient (or guardian if applicable) is aware that this is a billable service, which includes applicable co-pays. This Virtual Visit was conducted with patient's (and/or legal guardian's) consent. Patient identification was verified, and a caregiver was present when appropriate.   The patient was located at Home: [address] [place] [id number]  Provider was located at Facility (Appt Dept): [address] [place] [id number]  Confirm you

## 2024-11-18 ENCOUNTER — TELEMEDICINE (OUTPATIENT)
Dept: PSYCHOLOGY | Age: 29
End: 2024-11-18

## 2024-11-18 DIAGNOSIS — F33.1 MODERATE EPISODE OF RECURRENT MAJOR DEPRESSIVE DISORDER (HCC): Primary | Chronic | ICD-10-CM

## 2024-11-18 DIAGNOSIS — F41.1 GAD (GENERALIZED ANXIETY DISORDER): Chronic | ICD-10-CM

## 2024-11-18 NOTE — PROGRESS NOTES
increasing balanced thinking, Supportive techniques, and Identified maladaptive thoughts      Lynette Martinez, was evaluated through a synchronous (real-time) audio-video encounter. The patient (or guardian if applicable) is aware that this is a billable service, which includes applicable co-pays. This Virtual Visit was conducted with patient's (and/or legal guardian's) consent. Patient identification was verified, and a caregiver was present when appropriate.   The patient was located at Home: [address] [place] [address]  Provider was located at Facility (Appt Dept): [address] [place] [address]  Confirm you are appropriately licensed, registered, or certified to deliver care in the state where the patient is located as indicated above. If you are not or unsure, please re-schedule the visit: Yes, I confirm.        Total time spent for this encounter:  25m    --Annemarie Caba, PhD on 11/18/2024 at 11:09 AM    An electronic signature was used to authenticate this note.

## 2024-12-16 ENCOUNTER — TELEMEDICINE (OUTPATIENT)
Dept: PSYCHOLOGY | Age: 29
End: 2024-12-16

## 2024-12-16 DIAGNOSIS — F33.1 MODERATE EPISODE OF RECURRENT MAJOR DEPRESSIVE DISORDER (HCC): Chronic | ICD-10-CM

## 2024-12-16 DIAGNOSIS — F41.1 GAD (GENERALIZED ANXIETY DISORDER): Primary | Chronic | ICD-10-CM

## 2024-12-16 NOTE — PROGRESS NOTES
Behavioral Health Consultation  Annemarie Caba, Ph.D.  Psychologist  12/16/2024  10:34 AM      Time spent with Patient: 28 minutes  This is patient's  49th   [facility] appointment.    Reason for Consult:    Chief Complaint   Patient presents with    Depression       Feedback given to PCP.    S:  Pt seen for f/u of depression, anxiety. Pt reported poor mood and sxs as it is her busy season at work and is consistently either working or too tired to do anything else. Will have time off in Jan. As such, was unable to do her homework. Did read over cog distortions hmwk and struggled providing alternative explanations bc she feels like the negatives about her are too true to have alternatives. \"People regard me as a friend just fine, but anything further than that, absolutely not. I think men regard me like a helpful appliance, like a toaster.\" Perceives she will not be successful in same-sex rel bc she doesn't fit into the archetypes on the edge of the continuum. Examined the distortion of this.        O:  MSE:  Appearance    alert, cooperative  Appetite normal  Sleep disturbance No  Fatigue Yes  Loss of pleasure No  Impulsive behavior No  Speech    spontaneous, normal rate, normal volume and well articulated  Mood    Depression  Affect    depression affect  Thought Content    intact  Thought Process    goal directed and coherent  Associations    logical connections  Insight    Fair  Judgment    Intact  Orientation    oriented to person, place, time, and general circumstances  Memory    recent and remote memory intact  Attention/Concentration    impaired  Morbid ideation No  Suicide Assessment    no suicidal ideation    History:  Social History:   Social History     Socioeconomic History    Marital status: Single     Spouse name: Not on file    Number of children: Not on file    Years of education: Not on file    Highest education level: Not on file   Occupational History    Occupation:  grad in fine arts     Comment:

## 2025-01-27 ENCOUNTER — TELEMEDICINE (OUTPATIENT)
Dept: PSYCHOLOGY | Age: 30
End: 2025-01-27
Payer: COMMERCIAL

## 2025-01-27 DIAGNOSIS — F33.1 MODERATE EPISODE OF RECURRENT MAJOR DEPRESSIVE DISORDER (HCC): Chronic | ICD-10-CM

## 2025-01-27 DIAGNOSIS — F41.1 GAD (GENERALIZED ANXIETY DISORDER): Primary | Chronic | ICD-10-CM

## 2025-01-27 PROCEDURE — 90832 PSYTX W PT 30 MINUTES: CPT | Performed by: PSYCHOLOGIST

## 2025-01-27 NOTE — PROGRESS NOTES
Behavioral Health Consultation  Annemarie Caba, Ph.D.  Psychologist  1/27/2025  12:34 PM      Time spent with Patient: 27 minutes  This is patient's  50th   [facility] appointment.    Reason for Consult:    Chief Complaint   Patient presents with    Depression    Anxiety       Feedback given to PCP.    S:  Pt seen for f/u of depression, anxiety. Pt reported unchanged mood and sxs. Work has been a bit less stressful, so she having more hours to clean her home and playing a [employer] game.     Revisted her sense of being seen by society as useless d/t not fitting some societal norms regarding attractiveness. Endorsed some level of misandry. Was talking to mom about how a portion of men won't talk to her bc of her appearance and mom said \"well, it's also your personality.\"     O:  MSE:  Appearance    alert, cooperative  Appetite normal  Sleep disturbance No  Fatigue Yes  Loss of pleasure No  Impulsive behavior No  Speech    spontaneous, normal rate, normal volume and well articulated  Mood    Depression  Affect    depression affect  Thought Content    intact  Thought Process    goal directed and coherent  Associations    logical connections  Insight    Fair  Judgment    Intact  Orientation    oriented to person, place, time, and general circumstances  Memory    recent and remote memory intact  Attention/Concentration    impaired  Morbid ideation No  Suicide Assessment    no suicidal ideation    History:  Social History:   Social History     Socioeconomic History    Marital status: Single     Spouse name: Not on file    Number of children: Not on file    Years of education: Not on file    Highest education level: Not on file   Occupational History    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   Tobacco Use    Smoking status: Never    Smokeless tobacco: Never   Vaping Use    Vaping status: Never Used   Substance and Sexual Activity

## 2025-02-24 ENCOUNTER — TELEMEDICINE (OUTPATIENT)
Dept: PSYCHOLOGY | Age: 30
End: 2025-02-24
Payer: COMMERCIAL

## 2025-02-24 DIAGNOSIS — F41.1 GAD (GENERALIZED ANXIETY DISORDER): Chronic | ICD-10-CM

## 2025-02-24 DIAGNOSIS — F33.1 MODERATE EPISODE OF RECURRENT MAJOR DEPRESSIVE DISORDER (HCC): Primary | Chronic | ICD-10-CM

## 2025-02-24 PROCEDURE — 90832 PSYTX W PT 30 MINUTES: CPT | Performed by: PSYCHOLOGIST

## 2025-02-24 NOTE — PROGRESS NOTES
Behavioral Health Consultation  Annemarie Caba, Ph.D.  Psychologist  2/24/2025  9:14 AM      Time spent with Patient: 30 minutes  This is patient's  51st   [facility] appointment.    Reason for Consult:    Chief Complaint   Patient presents with    Anxiety       Feedback given to PCP.    S:  Pt seen for f/u of depression, anxiety. Pt reported poor mood and sxs. Work has been slow, which has allowed her more energy to care for her home and spend more time w friend, Jaylin, who is about to move to [place]. Hopes to see her off to airport on Wednesday, if pt feels better. Pt feels better when she is cleaning apartment or with her friends. Struggling w state of the world. She is avoiding the news, but hears things and then will seek out news stories about specific topics.       Discussed my transition and idea of traditional psychotherapy.     O:  MSE:  Appearance    alert, cooperative  Appetite normal  Sleep disturbance No  Fatigue Yes  Loss of pleasure No  Impulsive behavior No  Speech    spontaneous, normal rate, normal volume and well articulated  Mood    Depression  Affect    depression affect  Thought Content    intact  Thought Process    goal directed and coherent  Associations    logical connections  Insight    Fair  Judgment    Intact  Orientation    oriented to person, place, time, and general circumstances  Memory    recent and remote memory intact  Attention/Concentration    impaired  Morbid ideation No  Suicide Assessment    no suicidal ideation    History:  Social History:   Social History     Socioeconomic History    Marital status: Single     Spouse name: Not on file    Number of children: Not on file    Years of education: Not on file    Highest education level: Not on file   Occupational History    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   Tobacco Use    Smoking status: Never    Smokeless tobacco: Never   Vaping Use

## 2025-03-02 SDOH — ECONOMIC STABILITY: FOOD INSECURITY: WITHIN THE PAST 12 MONTHS, THE FOOD YOU BOUGHT JUST DIDN'T LAST AND YOU DIDN'T HAVE MONEY TO GET MORE.: NEVER TRUE

## 2025-03-02 SDOH — ECONOMIC STABILITY: INCOME INSECURITY: IN THE LAST 12 MONTHS, WAS THERE A TIME WHEN YOU WERE NOT ABLE TO PAY THE MORTGAGE OR RENT ON TIME?: NO

## 2025-03-02 SDOH — ECONOMIC STABILITY: FOOD INSECURITY: WITHIN THE PAST 12 MONTHS, YOU WORRIED THAT YOUR FOOD WOULD RUN OUT BEFORE YOU GOT MONEY TO BUY MORE.: NEVER TRUE

## 2025-03-03 ENCOUNTER — TELEMEDICINE (OUTPATIENT)
Dept: INTERNAL MEDICINE CLINIC | Age: 30
End: 2025-03-03

## 2025-03-03 DIAGNOSIS — G47.33 OBSTRUCTIVE SLEEP APNEA SYNDROME: Primary | ICD-10-CM

## 2025-03-03 DIAGNOSIS — F32.A ANXIETY AND DEPRESSION: ICD-10-CM

## 2025-03-03 DIAGNOSIS — F41.9 ANXIETY AND DEPRESSION: ICD-10-CM

## 2025-03-03 RX ORDER — SERTRALINE HYDROCHLORIDE 100 MG/1
200 TABLET, FILM COATED ORAL DAILY
Qty: 180 TABLET | Refills: 1 | Status: SHIPPED | OUTPATIENT
Start: 2025-03-03

## 2025-03-03 RX ORDER — BUPROPION HYDROCHLORIDE 300 MG/1
300 TABLET ORAL EVERY MORNING
Qty: 90 TABLET | Refills: 1 | Status: SHIPPED | OUTPATIENT
Start: 2025-03-03

## 2025-03-03 ASSESSMENT — PATIENT HEALTH QUESTIONNAIRE - PHQ9
SUM OF ALL RESPONSES TO PHQ QUESTIONS 1-9: 11
SUM OF ALL RESPONSES TO PHQ QUESTIONS 1-9: 11
8. MOVING OR SPEAKING SO SLOWLY THAT OTHER PEOPLE COULD HAVE NOTICED. OR THE OPPOSITE, BEING SO FIGETY OR RESTLESS THAT YOU HAVE BEEN MOVING AROUND A LOT MORE THAN USUAL: NOT AT ALL
SUM OF ALL RESPONSES TO PHQ QUESTIONS 1-9: 11
SUM OF ALL RESPONSES TO PHQ QUESTIONS 1-9: 11
1. LITTLE INTEREST OR PLEASURE IN DOING THINGS: SEVERAL DAYS
7. TROUBLE CONCENTRATING ON THINGS, SUCH AS READING THE NEWSPAPER OR WATCHING TELEVISION: NOT AT ALL
6. FEELING BAD ABOUT YOURSELF - OR THAT YOU ARE A FAILURE OR HAVE LET YOURSELF OR YOUR FAMILY DOWN: SEVERAL DAYS
5. POOR APPETITE OR OVEREATING: MORE THAN HALF THE DAYS
9. THOUGHTS THAT YOU WOULD BE BETTER OFF DEAD, OR OF HURTING YOURSELF: NOT AT ALL
2. FEELING DOWN, DEPRESSED OR HOPELESS: SEVERAL DAYS
4. FEELING TIRED OR HAVING LITTLE ENERGY: NEARLY EVERY DAY
3. TROUBLE FALLING OR STAYING ASLEEP: NEARLY EVERY DAY
10. IF YOU CHECKED OFF ANY PROBLEMS, HOW DIFFICULT HAVE THESE PROBLEMS MADE IT FOR YOU TO DO YOUR WORK, TAKE CARE OF THINGS AT HOME, OR GET ALONG WITH OTHER PEOPLE: SOMEWHAT DIFFICULT

## 2025-03-03 ASSESSMENT — ANXIETY QUESTIONNAIRES
6. BECOMING EASILY ANNOYED OR IRRITABLE: NOT AT ALL
IF YOU CHECKED OFF ANY PROBLEMS ON THIS QUESTIONNAIRE, HOW DIFFICULT HAVE THESE PROBLEMS MADE IT FOR YOU TO DO YOUR WORK, TAKE CARE OF THINGS AT HOME, OR GET ALONG WITH OTHER PEOPLE: SOMEWHAT DIFFICULT
3. WORRYING TOO MUCH ABOUT DIFFERENT THINGS: SEVERAL DAYS
GAD7 TOTAL SCORE: 5
7. FEELING AFRAID AS IF SOMETHING AWFUL MIGHT HAPPEN: MORE THAN HALF THE DAYS
5. BEING SO RESTLESS THAT IT IS HARD TO SIT STILL: NOT AT ALL
2. NOT BEING ABLE TO STOP OR CONTROL WORRYING: NOT AT ALL
4. TROUBLE RELAXING: SEVERAL DAYS
1. FEELING NERVOUS, ANXIOUS, OR ON EDGE: SEVERAL DAYS

## 2025-03-03 ASSESSMENT — ENCOUNTER SYMPTOMS: SHORTNESS OF BREATH: 0

## 2025-03-03 NOTE — PROGRESS NOTES
Head: Normocephalic and atraumatic.   Pulmonary:      Effort: Pulmonary effort is normal. No respiratory distress.   Skin:     Coloration: Skin is not jaundiced or pale.   Neurological:      Mental Status: She is alert and oriented to person, place, and time.   Psychiatric:         Mood and Affect: Mood normal.         Behavior: Behavior normal.      Due to this being a TeleHealth encounter, evaluation of the following organ systems is limited: Vitals/Constitutional/EENT/Resp/CV/GI//MS/Neuro/Skin/Heme-Lymph-Imm.    ASSESSMENT/PLAN:  Lynette \"Vero\" was seen today for 6 month follow-up.    Diagnoses and all orders for this visit:    Obstructive sleep apnea syndrome        -  chronic, uncontrolled        -  non compliant with CPAP        -  continue with sleep medicine    Anxiety and depression        -  chronic-stable.        -  PHQ-9 is 11 and BRENTON-7 is 5        -  denies SI/HI        -  continue with psychologist, Dr. Caba        -  continue wellbutrin  mg and Zoloft 200 mg daily.        -  follow-up with primary care provider in 6 months  -  buPROPion (WELLBUTRIN XL) 300 MG extended release tablet; Take 1 tablet by mouth every morning  -  sertraline (ZOLOFT) 100 MG tablet; Take 2 tablets by mouth daily    Return in about 6 months (around 9/3/2025) for anxiety/depression with Sharlene .      --MARS Anderson - NP on 3/3/2025 at 9:19 AM    An electronic signature was used to authenticate this note.

## 2025-03-03 NOTE — ASSESSMENT & PLAN NOTE
-  chronic-stable.  -  PHQ-9 is 11 and BRENTON-7 is 5  -  denies SI/HI  -  continue with psychologist, Dr. Caba  -  continue wellbutrin  mg and Zoloft 200 mg daily.  -  follow-up with primary care provider in 6 months